# Patient Record
Sex: MALE | Race: WHITE | Employment: OTHER | URBAN - METROPOLITAN AREA
[De-identification: names, ages, dates, MRNs, and addresses within clinical notes are randomized per-mention and may not be internally consistent; named-entity substitution may affect disease eponyms.]

---

## 2017-05-03 ENCOUNTER — TELEPHONE (OUTPATIENT)
Dept: INTERNAL MEDICINE | Facility: CLINIC | Age: 77
End: 2017-05-03

## 2017-05-03 DIAGNOSIS — I10 ESSENTIAL HYPERTENSION WITH GOAL BLOOD PRESSURE LESS THAN 140/90: ICD-10-CM

## 2017-05-03 LAB
CREAT SERPL-MCNC: 1.34 MG/DL (ref 0.66–1.25)
CREAT UR-MCNC: 83 MG/DL
GFR SERPL CREATININE-BSD FRML MDRD: 52 ML/MIN/1.7M2
MICROALBUMIN UR-MCNC: 754 MG/L
MICROALBUMIN/CREAT UR: 906.25 MG/G CR (ref 0–17)

## 2017-05-03 PROCEDURE — 82565 ASSAY OF CREATININE: CPT | Performed by: INTERNAL MEDICINE

## 2017-05-03 PROCEDURE — 82043 UR ALBUMIN QUANTITATIVE: CPT | Performed by: INTERNAL MEDICINE

## 2017-05-03 PROCEDURE — 36415 COLL VENOUS BLD VENIPUNCTURE: CPT | Performed by: INTERNAL MEDICINE

## 2017-05-03 NOTE — TELEPHONE ENCOUNTER
Patient called back and info was given.  Thank you,  Danyell Esquivel   for [facility]

## 2017-05-03 NOTE — TELEPHONE ENCOUNTER
----- Message from Richi Alvarez MD sent at 5/3/2017 11:30 AM CDT -----  Kidneys are stable, some protein in the urine but less then 5 months ago, creatinine is stable at 1.34 today.

## 2017-05-18 ENCOUNTER — TELEPHONE (OUTPATIENT)
Dept: PALLIATIVE MEDICINE | Facility: CLINIC | Age: 77
End: 2017-05-18

## 2017-05-18 ENCOUNTER — OFFICE VISIT (OUTPATIENT)
Dept: NEUROSURGERY | Facility: CLINIC | Age: 77
End: 2017-05-18
Payer: MEDICARE

## 2017-05-18 VITALS — BODY MASS INDEX: 36.43 KG/M2 | TEMPERATURE: 97.4 F | WEIGHT: 246 LBS | HEIGHT: 69 IN

## 2017-05-18 DIAGNOSIS — M54.16 LUMBAR RADICULOPATHY: Primary | ICD-10-CM

## 2017-05-18 PROCEDURE — 99203 OFFICE O/P NEW LOW 30 MIN: CPT | Performed by: PHYSICIAN ASSISTANT

## 2017-05-18 NOTE — MR AVS SNAPSHOT
After Visit Summary   5/18/2017    Endy Navarro    MRN: [id number]           Patient Information     Date Of Birth          1940        Visit Information        Provider Department      5/18/2017 2:40 PM Nikole Mckee PA-C [facility]        Today's Diagnoses     Lumbar radiculopathy    -  1       Follow-ups after your visit        Additional Services     PAIN MANAGEMENT CENTER ([place]) REFERRAL       Your provider has referred you to the [facility].    Reason for Referral: Procedure or injection only - patient will be contacted within 24 hrs to schedule: Lumbar FRANKIE. Provider to determine level.    Please complete the following questions:    What is your diagnosis for the patient's pain? Lumbar radiculopathy    Do you have any specific questions for the pain specialist? No    Are there any red flags that may impact the assessment or management of the patient? None    **ANY DIAGNOSTIC TESTS THAT ARE NOT IN EPIC SHOULD BE SENT TO THE PAIN CENTER**    Please note the Pre-Op Pain Consult must be scheduled 2-3 weeks prior to the patient's surgery.  Patient's trying to schedule within 2 weeks of surgery may not be accommodated.     Pre-Op Pain Consults are only good for 30 days.    REGARDING OPIOID MEDICATIONS:  We will always address appropriateness of opioid pain medications, but we generally will not automatically take on a prescribing role. When we do take on prescribing of opioids for chronic pain, it is in collaboration with the referring physician for an intermediate period of time (months), with an expectation that the primary physician or provider will assume the prescribing role if medications are effective at stable doses with demonstrated compliance.  Therefore, please do not assume that your prescribing responsibilities end on the day of pain clinic consultation.  Is this agreeable to you? YES    For any questions, contact the [facility] at [phone].    Please be aware that coverage of these services is subject to the terms and limitations of your health insurance plan.  Call member services at your health plan with any benefit or coverage questions.      Please bring the following with you to your appointment:    (1) Any X-Rays, CTs or MRIs which have been performed.  Contact the facility where they were done to arrange for  prior to your scheduled appointment.    (2) List of current medications   (3) This referral request   (4) Any documents/labs given to you for this referral            PHYSICAL THERAPY REFERRAL       *This therapy referral will be filtered to a centralized scheduling office at [facility] and the patient will receive a call to schedule an appointment at a [place] location most convenient for them. *     [facility] provides Physical Therapy evaluation and treatment and many specialty services across the [place] system.  If requesting a specialty program, please choose from the list below.    If you have not heard from the scheduling office within 2 business days, please call [phone] for all locations, with the exception of [place], please call [phone].  Treatment: Evaluation & Treatment  Special Instructions/Modalities:   Special Programs: None    Please be aware that coverage of these services is subject to the terms and limitations of your health insurance plan.  Call member services at your health plan with any benefit or coverage questions.      **Note to Provider:  If you are referring outside of [place] for the therapy appointment, please list the name of the location in the  special instructions  above, print the referral and give to the patient to schedule the appointment.                  Who to contact     If you have questions or need follow up information about today's clinic visit or your schedule please contact [facility]  "[place] directly at [phone].  Normal or non-critical lab and imaging results will be communicated to you by MyChart, letter or phone within 4 business days after the clinic has received the results. If you do not hear from us within 7 days, please contact the clinic through Epic!hart or phone. If you have a critical or abnormal lab result, we will notify you by phone as soon as possible.  Submit refill requests through SprainGo or call your pharmacy and they will forward the refill request to us. Please allow 3 business days for your refill to be completed.          Additional Information About Your Visit        Epic!harKnowable Information     SprainGo gives you secure access to your electronic health record. If you see a primary care provider, you can also send messages to your care team and make appointments. If you have questions, please call your primary care clinic.  If you do not have a primary care provider, please call [phone] and they will assist you.        Care EveryWhere ID     This is your Care EveryWhere ID. This could be used by other organizations to access your [place] medical records  [id number]        Your Vitals Were     Temperature Height BMI (Body Mass Index)             97.4  F (36.3  C) (Temporal) 1.753 m (5' 9\") 36.33 kg/m2          Blood Pressure from Last 3 Encounters:   12/05/16 162/88   11/11/16 136/86   10/20/16 134/76    Weight from Last 3 Encounters:   05/18/17 111.6 kg (246 lb)   11/11/16 109.8 kg (242 lb)   10/20/16 109.5 kg (241 lb 4.8 oz)              We Performed the Following     PAIN MANAGEMENT CENTER ([place]) REFERRAL     PHYSICAL THERAPY REFERRAL        Primary Care Provider Office Phone # Fax #    Richi Alvarez [phone]       [facility] [address] [place] [address]        Thank you!     Thank you for choosing [facility]  for your care. Our goal is always to provide you with excellent care. Hearing back from our " patients is one way we can continue to improve our services. Please take a few minutes to complete the written survey that you may receive in the mail after your visit with us. Thank you!             Your Updated Medication List - Protect others around you: Learn how to safely use, store and throw away your medicines at www.disposemymeds.org.          This list is accurate as of: 5/18/17  2:59 PM.  Always use your most recent med list.                   Brand Name Dispense Instructions for use    acetaminophen 650 MG 8 hour tablet     100 tablet    Take 650 mg by mouth every 6 hours       allopurinol 100 MG tablet    ZYLOPRIM    180 tablet    Take 1 tablet (100 mg) by mouth 2 times daily       apixaban ANTICOAGULANT 5 MG tablet    ELIQUIS    180 tablet    Take 1 tablet (5 mg) by mouth 2 times daily       aspirin 81 MG EC tablet      Take 1 tablet (81 mg) by mouth daily       atorvastatin 40 MG tablet    LIPITOR    90 tablet    Take 1 tablet (40 mg) by mouth daily       diltiazem 120 MG 24 hr capsule    DILACOR XR    90 capsule    Take 1 capsule (120 mg) by mouth daily       finasteride 5 MG tablet    PROSCAR    90 tablet    Take 1 tablet (5 mg) by mouth daily       FLUoxetine 20 MG capsule    PROzac    270 capsule    Take 3 capsules (60 mg) by mouth daily       metoprolol 50 MG tablet    LOPRESSOR    180 tablet    Take 1 tablet (50 mg) by mouth 2 times daily       mirtazapine 7.5 MG Tabs tablet    REMERON    90 tablet    Take 1 tablet (7.5 mg) by mouth At Bedtime       nitroglycerin 0.4 MG sublingual tablet    NITROSTAT    25 tablet    Place 1 tablet (0.4 mg) under the tongue every 5 minutes as needed for chest pain       polyethylene glycol Packet    MIRALAX/GLYCOLAX    7 packet    Take 17 g by mouth daily

## 2017-05-18 NOTE — NURSING NOTE
"Endy Navarro is a 76 year old male who presents for:  Chief Complaint   Patient presents with     Consult     Neurologic Problem        Initial Vitals:  Temp 97.4  F (36.3  C) (Temporal)  Ht 1.753 m (5' 9\")  Wt 111.6 kg (246 lb)  BMI 36.33 kg/m2 Estimated body mass index is 36.33 kg/(m^2) as calculated from the following:    Height as of this encounter: 1.753 m (5' 9\").    Weight as of this encounter: 111.6 kg (246 lb).. Body surface area is 2.33 meters squared. BP completed using cuff size: NA (Not Taken)  Data Unavailable    Do you feel safe in your environment?  Yes  Do you need any refills today? No    Nursing Comments:         Josemanuel Nuñez    "

## 2017-05-18 NOTE — PROGRESS NOTES
Dr. Len Barton  [facility]  Neurosurgery Clinic Visit      CC: Left sided low back pain    Primary care Provider: Richi Alvarez      Reason For Visit:   I was asked  to consult on the patient for low back pain.      HPI: Endy Navarro is a 76 year old male who presents for evaluation of left-sided low back pain. Pain has been chronic for many years but has progressively worsened. Radiates down posterior aspect of left leg to the foot. Describes the pain as constantly achy, with intermittent sharp shooting pain. Walking for extended periods causes the pain to worsen. He cannot recall anything that makes the pain better. He has previously been seen at [facility] where he underwent lumbar FRANKIE, which helped relieve the pain for 1-2 months. He has fallen a few times due to stability issues. Denies bladder or bowel incontinence.  Current pain: 5-6/10 At worst: 10/10    Patient also states he had a shunt placed 2 years prior and would like to have the settings checked and change. He had this placed in [place].    Past Medical History:   Diagnosis Date     Anxiety state, unspecified      Atrial fibrillation (H)      Bell's palsy 12/12/1997     Bioprosthetic aortic valve replacement during current hospitalization      Coronary atherosclerosis of unspecified type of vessel, native or graft     coronary artery disease with history of MI and stent placement      Gout, unspecified      Hydrocephalus 2015     Old myocardial infarction 3/1998    Hx of MI     Osteoarthrosis, unspecified whether generalized or localized, unspecified site     Diffuse migratory arthritis     Other and unspecified hyperlipidemia      Paroxysmal supraventricular tachycardia (H)     Hx of PAT     Pure hypercholesterolemia      Stented coronary artery        Past Medical History reviewed with patient during visit.    Past Surgical History:   Procedure Laterality Date     C EXPLORATORY OF ABDOMEN  1/25/2007    Exploratory  laparotomy.  Lysis of adhesions with reduction of internal hernia.     C ROTATOR CUFF STRAP      s/p rotator cuff surgery     COLONOSCOPY N/A 12/5/2016    Procedure: COMBINED COLONOSCOPY, SINGLE OR MULTIPLE BIOPSY/POLYPECTOMY BY BIOPSY;  Surgeon: Benjamin Cavazos MD;  Location: [facility]     HC COLONOSCOPY THRU STOMA, DIAGNOSTIC  8/23/2004     HC KNEE SCOPE,MED/LAT MENISCUS REPAIR       HC REMOVE TONSILS/ADENOIDS,<13 Y/O      unsure of age     Hydrocephalus  2015    shunt placed     REMOVAL OF SPERM DUCT(S)      Vasectomy     REPLACE VALVE AORTIC N/A 9/14/2015    Procedure: REPLACE VALVE AORTIC;  Surgeon: Sang Chan MD;  Location: [facility]     Past Surgical History reviewed with patient during visit.    Current Outpatient Prescriptions   Medication     finasteride (PROSCAR) 5 MG tablet     mirtazapine (REMERON) 7.5 MG TABS tablet     FLUoxetine (PROZAC) 20 MG capsule     allopurinol (ZYLOPRIM) 100 MG tablet     atorvastatin (LIPITOR) 40 MG tablet     diltiazem (DILACOR XR) 120 MG 24 hr ER capsule     metoprolol (LOPRESSOR) 50 MG tablet     nitroglycerin (NITROSTAT) 0.4 MG SL tablet     apixaban ANTICOAGULANT (ELIQUIS) 5 MG tablet     acetaminophen 650 MG TABS     aspirin EC 81 MG EC tablet     polyethylene glycol (MIRALAX/GLYCOLAX) packet     No current facility-administered medications for this visit.        Allergies   Allergen Reactions     Seroquel [Quetiapine] Other (See Comments)     Felt funny       Social History     Social History     Marital status:      Spouse name: N/A     Number of children: N/A     Years of education: N/A     Occupational History     Laundry [employer]      Social History Main Topics     Smoking status: Former Smoker     Smokeless tobacco: Never Used      Comment: quit 25 yr ago     Alcohol use 0.0 oz/week     0 Standard drinks or equivalent per week      Comment: Occ     Drug use: No     Sexual activity: Yes     Partners: Female     Other Topics Concern      Service  Yes     Blood Transfusions No     Caffeine Concern No     coffee- 3-4 cups daily     Occupational Exposure No     Hobby Hazards No     Sleep Concern Yes     trouble sleeping     Stress Concern No     Weight Concern No     Special Diet No     Back Care Yes     Low back pain with riding in car     Exercise No     Seat Belt Yes     Self-Exams Yes     Social History Narrative       Family History   Problem Relation Age of Onset     C.A.D. Sister      C.A.D. Brother      DIABETES Maternal Aunt      CEREBROVASCULAR DISEASE Mother      CEREBROVASCULAR DISEASE Father      Hypertension Father      Cancer - colorectal No family hx of      Prostate Cancer Brother           ROS: 10 point ROS neg other than the symptoms noted above in the HPI.    Vital Signs: There were no vitals taken for this visit.    Examination:  Constitutional:  Alert, well nourished, NAD.  HEENT: Normocephalic, atraumatic.   Pulmonary:  Without shortness of breath, normal effort.   Lymph: no lymphadenopathy to low back or LE.   Integumentary: Skin is free of rashes or lesions.   Cardiovascular:  No pitting edema of BLE.      Neurological:  Awake  Alert  Oriented x 3  Speech clear  Cranial nerves II - XII grossly intact  Tongue midline  Motor exam   Shoulder Abduction:  Right:  5/5   Left:  5/5  Biceps:                      Right:  5/5   Left:  5/5  Triceps:                     Right:  5/5   Left:  5/5  Wrist Extensors:       Right:  5/5   Left:  5/5  Wrist Flexors:           Right:  5/5   Left:  5/5  Intrinsics:                   Right:  5/5   Left:  5/5  Hip Flexor:                Right: 5/5  Left:  5/5  Hip Adductor:             Right:  5/5  Left:  5/5  Hip Abductor:             Right:  5/5  Left:  5/5  Gastroc Soleus:        Right:  5/5  Left:  5/5  Tib/Ant:                      Right:  5/5  Left:  5/5  EHL:                          Right:  5/5  Left:  5/5       Sensation normal to bilateral upper and lower extremities.    Reflexes are 2+ in the  patellar and Achilles. There is no clonus. Downgoing Babinski.    Reflexes are 2+ in the brachial radialis and triceps. Negative Eleni sign bilaterally.  Musculoskeletal:  Gait: Able to stand from a seated position. Normal non-antalgic, non-myelopathic gait.  Able to heel/toe walk without loss of balance  Lumbar examination reveals no tenderness of the spine or paraspinous muscles.  Hip height is symmetrical. Negative SI joint, sciatic notch or greater trochanteric tenderness to palpation bilaterally.  Straight leg raise is negative bilaterally.      Imaging:   CT of the lumbar spine from 2/29/2016 was reviewed in the office today. Reveals multilevel secondary disc and facet change with alignment changes, with canal stenosis at L3-4 and L4-5, with lesser changes contributing primarily to lateral recess-foraminal stenosis L1-S1.      Assessment/Plan:   Endy Navarro is a 76 year old male who presents for evaluation of left-sided low back pain. Pain has been chronic for many years but has progressively worsened. Radiates down posterior aspect of left leg to the foot. He has undergone FRANKIE's in the past, which has provided relief. After discussion with the patient, he would like to try another FRANKIE and physical therapy at this time. Lumbar FRANKIE and PT referral sent. Advised patient to talk with doctor about stopping blood thinners for injection. Patient in agreement with plan. Also advised patient to have records from shunt placement sent to our office and call to schedule appointment in regards to this.          Nikole Mckee PA-C  [facility]  [address] [address]    Tel [phone]  Pager [phone]

## 2017-05-18 NOTE — TELEPHONE ENCOUNTER
Pre-screening Questions for Radiology Injections:    Injection to be done at which interventional clinic site? Given    Procedure ordered by Nikole Mckee    Procedure ordered? Lumbar Epidural Steroid Injection    What insurance would patient like us to bill for this procedure? BCBS      Worker's comp-Any injection DO NOT SCHEDULE and route to Mirian Torres.      [employer] insurance - For SI joint injections, DO NOT SCHEDULE and route Mirian Torres.      [employer]- MBB's must be scheduled at LEAST two weeks apart      [employer] - Any injection besides hip/shoulder/knee joint DO NOT SCHEDULE and route to Mirian Torres. She will obtain PA and call pt back to schedule procedure or notify pt of denial.     HP [employer]-PA REQUIRED FOR NON-FRANKIE OR Joint injections    Any chance of pregnancy? Not Applicable   If YES, do NOT schedule and route to RN pool    Is an  needed? No     Patient has a drive home? (mandatory) YES: INFORMED    Is patient taking any blood thinners (plavix, coumadin, jantoven, warfarin, heparin, pradaxa or dabigatran )? Yes - apixaban ANTICOAGULANT (ELIQUIS) 5 MG tablet   If hold needed, do NOT schedule, route to RN pool     Is patient taking any aspirin products? Yes - Pt takes 81mg daily; instructed to hold 0 day(s) prior to procedure.      If more than 325mg/day do NOT schedule; route to RN pool     For CERVICAL procedures, hold all aspirin products for 6 days.      Does the patient have a bleeding or clotting disorder? No     If yes, okay to schedule AND route to RN nurse pool    **For any patients with platelet count <100, must be forwarded to provider**    Is patient diabetic?  No  If YES, have them bring their glucometer.    Does patient have an active infection or treated for one within the past week? No     Is patient currently taking any antibiotics?  No     For patients on chronic, preventative, or prophylactic antibiotics, procedures may be scheduled.     For patients on  antibiotics for active or recent infection:    Sharmaine Heller [place], [place]-antibiotic course must have been completed for 4 days    Marina Shanks-antibiotic course must have been completed for 7 days    Is patient currently taking any steroid medications? (i.e. Prednisone, Medrol)  No     For patients on steroid medications:    Sharmaine Heller [place], [place]-steroid course must have been completed for 4 days    Marina Shanks-steroid course must have been completed for 7 days    Reviewed with patient:  If you are started on any steroids or antibiotics between now and your appointment, you must contact us because it may affect our ability to perform your procedure.  Yes    Is patient actively being treated for cancer or immunocompromised, including the spleen having been removed? No    If YES, do NOT schedule and route to RN pool     **For Dr. Gerardo patients without spleens should have the chart sent to her**    Are you able to get on and off an exam table with minimal or no assistance? Yes  If NO, do NOT schedule and route to RN pool    Are you able to roll over and lay on your stomach with minimal or no assistance? Yes  If NO, do NOT schedule and route to RN pool     Any allergies to contrast dye, iodine, shellfish, or numbing and steroid medications? No  If YES, route to RN pool AND add allergy information to appointment notes    Allergies: Seroquel [quetiapine]        Has the patient had a flu shot or any other vaccinations within 7 days before or after the procedure.  No       Does patient have an MRI/CT?  YES: CT  (SI joint, hip injections, lumbar sympathetic blocks, and stellate ganglion blocks do not require an MRI)    Was the MRI done w/in the last 3 years?  YES -     Was MRI done at [facility]? No      If not, where was it done? IN [place] - [facility]       If MRI was not done at [facility], [facility] or [facility] do NOT schedule and route to nursing.  If pt has an  imaging disc, the injection may be scheduled but pt has to bring disc to appt. If they show up w/out disc the injection cannot be done    Reminders (please tell patient if applicable):       Instructed pt to arrive 30 minutes early for IV start if this is for a cervical procedure, ALL sympathetic (stellate ganglion, hypogastric, or lumbar sympathetic block) and all sedation procedures (RFA, spinal cord stimulation trials).  Not Applicable    -IVs are not routinely placed for Schmid and [place] cervical case       If NPO for sedation, informed patient that it is okay to take medications with sips of water (except if they are to hold blood thinners).  Not Applicable   *DO take blood pressure medication if it is prescribed*      If this is for a cervical FRANKIE, informed patient that aspirin needs to be held for 6 days.   Not Applicable      For all patients not having spinal cord stimulator (SCS) trials or radiofrequency ablations (RFAs), informed patient:  IV sedation is not provided for this procedure.  If you feel that an oral anti-anxiety medication is needed, you can discuss this further with your referring provider or primary care provider.  The Pain Clinic provider will discuss specifics of what the procedure includes at your appointment.  Most procedures last 10-20 minutes.  We use numbing medications to help with any discomfort during the procedure.  Not Applicable      Do not schedule procedures requiring IV placement in the first appointment of the day or first appointment after lunch. REVIEWED      For patients 85 or older we recommend having an adult stay w/ them for the remainder of the day.   REVIEWED    Does the patient have any questions?  NO  Verito Lindsey    [facility]    Verito Lindsey  [facility]

## 2017-05-19 NOTE — TELEPHONE ENCOUNTER
Dr. Morrell is it safe for pt to hold eloquis for 5 days before a lumbar epidural steroid injection?  Please keep call open.    Rubia Alonso RN-BSN  [facility]

## 2017-05-26 NOTE — TELEPHONE ENCOUNTER
Dr. Morrell,   Patient is requesting to schedule an injection with us. This would require an approval to hold the Eloquis for 5 days prior to injection with injection occuring on the 6th day. Barring any complications, typically patients resume their Eloquis 24hours after the injection.  We are requesting your approval to hold the medication for this time frame. We appreciate your assistance in this matter.     Please do not close encounter so that we may follow up with patient.     Jennifer Hairston  BSN-RN Care Coordinator  [facility]

## 2017-05-26 NOTE — TELEPHONE ENCOUNTER
Pt called wondering the status of scheduling Lumbar FRANKIE. Informed him that we are still waiting on a response from Dr. Morrell on holding his Eloquis. Please advise.    Verito Lindsey    [facility]

## 2017-05-30 NOTE — TELEPHONE ENCOUNTER
Per staff message from Dr Sevilla:  Message  Received: 5 days ago       John Morrell MD Fischer, Rubia ADAN, RN                   Yes - likely so.   3 days is probably enough.    Risk of stroke vs bleeding is greater for bleeding complications     je              Our protocol is a 5 day hold for eloquis.    Routed to Dr. Yenny Joshua to review.  Is 3 days adequate?  If not does provider mean a 5 day hold is okay too?    Rubia Alonso, RN-BSN  [facility]

## 2017-05-31 NOTE — TELEPHONE ENCOUNTER
Contact Attempt      Outreach attempted x 1.  Left message on voicemail with call back information and requested return call.    Plan:  Will await for CB.     Mackenzie Lai RN, [facility]  Pain Clinic Care Coordinator

## 2017-05-31 NOTE — TELEPHONE ENCOUNTER
Per PRINCE guidelines, a 3 day hold is adequate for Eloquis with injection on the 4th day and patient could re-start medication 6-8 hours after the injection.    Jacob Joshua MD  [facility]

## 2017-06-01 NOTE — TELEPHONE ENCOUNTER
5/31 442pm    Patient LM returning call about an injection appt [phone].    Gladys Florence    [facility]

## 2017-06-01 NOTE — TELEPHONE ENCOUNTER
Pt LM at 1004 on 5/31 returning a call about appt for injection. Phone # [phone]     eVrito Lindsey    [facility]

## 2017-06-02 NOTE — TELEPHONE ENCOUNTER
Scheduled from [place] location, advised to hold eloquis per below.     Jennifer Hairston  BSN-RN Care Coordinator  [facility]

## 2017-06-02 NOTE — TELEPHONE ENCOUNTER
Patient left  at 11:19 am    He is waiting for a return call.      Mirian BROUSSARD    [facility]

## 2017-06-05 ENCOUNTER — HOSPITAL ENCOUNTER (OUTPATIENT)
Dept: PHYSICAL THERAPY | Facility: CLINIC | Age: 77
Setting detail: THERAPIES SERIES
End: 2017-06-05
Attending: PHYSICIAN ASSISTANT
Payer: MEDICARE

## 2017-06-05 PROCEDURE — G8979 MOBILITY GOAL STATUS: HCPCS | Mod: GP,CI | Performed by: PHYSICAL THERAPIST

## 2017-06-05 PROCEDURE — 97530 THERAPEUTIC ACTIVITIES: CPT | Mod: GP | Performed by: PHYSICAL THERAPIST

## 2017-06-05 PROCEDURE — 97162 PT EVAL MOD COMPLEX 30 MIN: CPT | Mod: GP | Performed by: PHYSICAL THERAPIST

## 2017-06-05 PROCEDURE — G8978 MOBILITY CURRENT STATUS: HCPCS | Mod: GP,CJ | Performed by: PHYSICAL THERAPIST

## 2017-06-05 PROCEDURE — 40000718 ZZHC STATISTIC PT DEPARTMENT ORTHO VISIT: Performed by: PHYSICAL THERAPIST

## 2017-06-05 NOTE — PROGRESS NOTES
[facility]          OUTPATIENT PHYSICAL THERAPY ORTHOPEDIC EVALUATION  PLAN OF TREATMENT FOR OUTPATIENT REHABILITATION  (COMPLETE FOR INITIAL CLAIMS ONLY)  Patient's Last Name, First Name, M.I.  YOB: 1940  Endy Navarro    Provider s Name:  [facility]   Medical Record No.  [id number]   Start of Care Date:  06/05/17   Onset Date:  06/05/16   Type:     _X__PT   ___OT   ___SLP Medical Diagnosis:  Lumbar radiculopathy     PT Diagnosis:  impaired balance, Pain in L low back, buttock and posterior thigh.consistent with sciatic nerve pain.   Visits from SOC:  1      _________________________________________________________________________________  Plan of Treatment/Functional Goals:  manual therapy, neuromuscular re-education, ROM, strengthening, balance training, stretching     Cryotherapy     Goals  Goal Identifier: Walking  Goal Description: Pt able to walk for 1 mile with out increased back or leg pain  Target Date: 09/03/17    Goal Identifier: Stairs  Goal Description: Pt able to climb stairs reciprocally  in order to access basement  Target Date: 09/03/17    Goal Identifier: HEP  Goal Description: Indep with HEP for core stabilization and strengthening   Target Date: 09/03/17    Goal Identifier: Sleeping  Goal Description: pt sleeps in a recliner and has for many years.  Pt report that he sleeps poorly most of the time  Target Date: 09/03/17                      Therapy Frequency:  2 times/Week  Predicted Duration of Therapy Intervention:  90 days    Waleska Coles, PT                 I CERTIFY THE NEED FOR THESE SERVICES FURNISHED UNDER        THIS PLAN OF TREATMENT AND WHILE UNDER MY CARE     (Physician co-signature of this document indicates review and certification of the therapy plan).                       Certification Date From:  06/05/17   Certification Date To:  09/03/17    Referring Provider:  Nikole Tate  Assessment        See Epic Evaluation Start of Care Date: 06/05/17

## 2017-06-05 NOTE — PROGRESS NOTES
" 06/05/17 3336   General Information   Type of Visit Initial OP Ortho PT Evaluation   Start of Care Date 06/05/17   Referring Physician Nikole Mckee   Patient/Family Goals Statement to be able to walk with out pain   Orders Evaluate and Treat   Date of Order 05/18/17   Insurance Type Medicare  ([employer] secondary)   Medical Diagnosis Lumbar radiculopathy   Surgical/Medical history reviewed Yes  (Significant Cardiac HX, shunt!)   Precautions/Limitations (cardiac and pt has a shunt)   Body Part(s)   Body Part(s) Lumbar Spine/SI   Presentation and Etiology   Pertinent history of current problem (include personal factors and/or comorbidities that impact the POC) Pt reports that he is pretty sore from his belt line on left side and down his L leg. every so often he gets a shot down the R leg too (weekly).  Onset of sx about 1 year ago. He had 2 injections in his back  (most recently in October) which were helpful temporarily.  He likes to walk  Used to walk 3-4 miles per day (last fall) and now he can only do about 100yards.   increased pain with stairs,  in/out of car,  sleeps in his chair. Pain in L leg goes down to the ankle. Endorses some t/n in his feet.  Pain with putting on socks.  Stretching helps.  Has had brain (shunt) , abdominal and cardiac surgery  in the past 3 years.  He reports that he tires very easily and stairs \" really get to me\".  [place] to [place] and has a medical team down there too.      Impairments A. Pain;D. Decreased ROM;E. Decreased flexibility;F. Decreased strength and endurance;H. Impaired gait   Functional Limitations perform activities of daily living;perform required work activities;perform desired leisure / sports activities   Symptom Location L low back and L LE primarily.  Occasional sx in R LE   How/Where did it occur With repetition/overuse   Onset date of current episode/exacerbation 06/05/16   Chronicity Chronic   Pain rating (0-10 point scale) Best (/10);Worst (/10)   Best " (/10) 4   Worst (/10) 8   Pain quality C. Aching;B. Dull;A. Sharp   Frequency of pain/symptoms A. Constant   Pain/symptoms exacerbated by B. Walking;G. Certain positions;H. Overhead reach;J. ADL;K. Home tasks   Pain/symptoms eased by B. Walking;C. Rest;E. Changing positions;F. Certain positions  (takes tylenol,  no change. )   Progression of symptoms since onset: Worsened  (due to weight gain)   Prior Level of Function   Prior Level of Function-Mobility indep   Prior Level of Function-ADLs indep   Functional Level Prior Comment indep   Current Level of Function   Current Community Support Family/friend caregiver   Patient role/employment history F. Retired   Living environment House/townhome   Home/community accessibility no stairs to enter. and all on 1 level   Current equipment-Gait/Locomotion None   Current equipment-ADL None   Fall Risk Screen   Fall screen completed by PT   Per patient - Fall 2 or more times in past year? Yes   Per patient - Fall with injury in past year? No   Timed Up and Go score (seconds) 12.69   Is patient a fall risk? No   Fall screen comments times when he is walking and tries to change direction and he would fall over. TU.69. Decreased stance time on L.   System Outcome Measures   Outcome Measures Low Back Pain (see Oswestry and Lyric)   Vital Signs   Pulse 86   SpO2 97 %   Lumbar Spine/SI Objective Findings   Integumentary dry skin minor cuts and scrapes on ankles and legs but grossly in tact.    Posture Kyposis through throcic spine, forward head    Gait/Locomotion Decreased stance time on L. trunk lean to R   Balance/Proprioception (Single Leg Stance) Decreased balance during heel walking and toe walking, requires lino hand hold assist, but able to perform.    Flexion ROM Feels good ,90% forward bend excursion, decreased reversal of lumbar curve   Extension ROM reproduces symptoms in low back and L LE. 30% extension excursion    Right Side Bending ROM 50% sidebend excursion     Left Side Bending ROM Painful, 50% excursion    Lumbar ROM Comment Painful in L Low back and L buttock with rotation bilaterally. 30% rotation excursion bilaterally   Hip Screen R: (-) Scour, (-) Stinchfield, (-) BHAVNA, (-) SLR    L: (-) Scour, (-) Stinchfield, (+) BHAVNA - pain in L low back, (+) SLR at 40 degrees   Transversus Abdominus Strength (Laci Leg Lowering-deg) decreased functionally due to body habitus and  over all strength    Hip Flexion (L2) Strength L: 4-/5  R: 4+/5   Hip Extension Strength L: 4+/5, R: 4+/5, pain free   Knee Flexion Strength L: 5/5 R: 5/5   Knee Extension (L3) Strength L: 5/5 R: 5/5   Ankle Dorsiflexion (L4) Strength L: 5/5, R: 5/5   Great Toe Extension (L5) Strength L: 5/5, R:5/5    Ankle Plantar Flexion (S1) Strength L: 5/5, R: 5/5    Hamstring Flexibility 50 degrees   Hip Flexor Flexibility WFL   Quadricep Flexibility WFL   Piriformis Flexibility tight and TTP   SLR R: (-),   L:(+) at 40 degrees   Ely Test (-) bilaterally   Crossover SLR neg   Spring Test Lumbar PA stiff but not tender. Thoracic PA stiff with R rotation, not tender   Segmental Mobility tight all the way along spine   Palpation L multifidi overworking, R multifidi not firing well with prone SLR. Piriformis pain on L with bilfold carried on L. R ribs more prominent, R rotation of thoracic vertebrae. QL not tender or tight.    Planned Therapy Interventions   Planned Therapy Interventions manual therapy;neuromuscular re-education;ROM;strengthening;balance training;stretching   Planned Modality Interventions   Planned Modality Interventions Cryotherapy   Clinical Impression   Criteria for Skilled Therapeutic Interventions Met yes, treatment indicated   PT Diagnosis impaired balance, Pain in L low back, buttock and posterior thigh.consistent with sciatic nerve pain.   Influenced by the following impairments pain, impaired balance, decreased strength, endurance and flexibility   Functional limitations due to  impairments decreased ability to perform ADLS and IADLS   Clinical Presentation Evolving/Changing   Clinical Presentation Rationale Pt has a complex PMHX including an intracranial shunt,  previous MI and valve replacement surgery.    Clinical Decision Making (Complexity) Moderate complexity   Therapy Frequency 2 times/Week   Predicted Duration of Therapy Intervention (days/wks) 90 days   Risk & Benefits of therapy have been explained Yes   Patient, Family & other staff in agreement with plan of care Yes   Clinical Impression Comments Pt presents with impaired balance, Pain in L low back, buttock and posterior thigh.consistent with sciatic nerve pain. Pt will benefit from skilled PT for instruction in HEP for stretching, strengtheing and proprioceptive retraining.  Pt will also benefit from skilled PT for manual techniques and modalities to improve alignment and decrease pain   Education Assessment   Preferred Learning Style Listening   Barriers to Learning No barriers   ORTHO GOALS   PT Ortho Eval Goals 1;2;3;4   Ortho Goal 1   Goal Identifier Walking   Goal Description Pt able to walk for 1 mile with out increased back or leg pain   Target Date 09/03/17   Ortho Goal 2   Goal Identifier Stairs   Goal Description Pt able to climb stairs reciprocally  in order to access basement   Target Date 09/03/17   Ortho Goal 3   Goal Identifier HEP   Goal Description Indep with HEP for core stabilization and strengthening    Target Date 09/03/17   Ortho Goal 4   Goal Identifier Sleeping   Goal Description pt sleeps in a recliner and has for many years.  Pt report that he sleeps poorly most of the time   Target Date 09/03/17   Total Evaluation Time   Total Evaluation Time 50   Therapy Certification   Certification date from 06/05/17   Certification date to 09/03/17   Medical Diagnosis Lumbar radiculopathy

## 2017-06-06 ENCOUNTER — OFFICE VISIT (OUTPATIENT)
Dept: INTERNAL MEDICINE | Facility: CLINIC | Age: 77
End: 2017-06-06
Payer: MEDICARE

## 2017-06-06 VITALS
WEIGHT: 238.5 LBS | BODY MASS INDEX: 35.22 KG/M2 | OXYGEN SATURATION: 97 % | DIASTOLIC BLOOD PRESSURE: 80 MMHG | HEART RATE: 71 BPM | TEMPERATURE: 98.9 F | SYSTOLIC BLOOD PRESSURE: 128 MMHG | RESPIRATION RATE: 14 BRPM

## 2017-06-06 DIAGNOSIS — G89.29 CHRONIC LEFT-SIDED LOW BACK PAIN WITH LEFT-SIDED SCIATICA: ICD-10-CM

## 2017-06-06 DIAGNOSIS — I48.20 CHRONIC ATRIAL FIBRILLATION (H): ICD-10-CM

## 2017-06-06 DIAGNOSIS — M54.42 CHRONIC LEFT-SIDED LOW BACK PAIN WITH LEFT-SIDED SCIATICA: ICD-10-CM

## 2017-06-06 DIAGNOSIS — F33.0 MAJOR DEPRESSIVE DISORDER, RECURRENT EPISODE, MILD (H): ICD-10-CM

## 2017-06-06 DIAGNOSIS — Z01.818 PREOP GENERAL PHYSICAL EXAM: Primary | ICD-10-CM

## 2017-06-06 PROCEDURE — 99214 OFFICE O/P EST MOD 30 MIN: CPT | Performed by: INTERNAL MEDICINE

## 2017-06-06 ASSESSMENT — PAIN SCALES - GENERAL: PAINLEVEL: NO PAIN (0)

## 2017-06-06 NOTE — MR AVS SNAPSHOT
After Visit Summary   6/6/2017    Endy Navarro    MRN: [id number]           Patient Information     Date Of Birth          1940        Visit Information        Provider Department      6/6/2017 3:15 PM Richi Alvarez MD [facility]        Today's Diagnoses     Preop general physical exam    -  1    Mild major depression (H)          Care Instructions      Before Your Surgery      Call your surgeon if there is any change in your health. This includes signs of a cold or flu (such as a sore throat, runny nose, cough, rash or fever).    Do not smoke, drink alcohol or take over the counter medicine (unless your surgeon or primary care doctor tells you to) for the 24 hours before and after surgery.    If you take prescribed drugs: Follow your doctor s orders about which medicines to take and which to stop until after surgery.    Eating and drinking prior to surgery: follow the instructions from your surgeon    Take a shower or bath the night before surgery. Use the soap your surgeon gave you to gently clean your skin. If you do not have soap from your surgeon, use your regular soap. Do not shave or scrub the surgery site.  Wear clean pajamas and have clean sheets on your bed.           Follow-ups after your visit        Your next 10 appointments already scheduled     Jun 07, 2017  7:30 AM CDT   Ortho Treatment with Elisha Ling PT   [facility] ([facility])    [address] [place] [address]   [phone]            Jun 08, 2017   Procedure with Pawan Davenport MD   [facility] ([facility])    [address] [place] [address]   [phone]           From [address]: Exit at [address] on [address] side of [place]. Turn right on [address]. Turn left at stoplight on [address]. [facility] will be in view two blocks ahead            Jun 08, 2017  9:30 AM CDT   XR  FLUORO NEEDLE PLACEMENT SPINE with PHCARM1   [facility] ([facility])    [address]  [place] [id number] [phone]           For nerve root injection, please send or bring copies of any MRIs or other scans you have had.  Bring a list of your current medicines to your exam. (Include vitamins, minerals and over-the-counter medicines.) Leave your valuables at home.  Plan to have someone drive you home afterward.  Stop taking the following medicines (but talk to your doctor first):   If you take blood thinners, you may need to stop taking them a few days before treatment. Talk to your doctor before stopping these medicines.Stop taking Coumadin (warfarin) 3 days before treatment. Restart the day after treatment.   If you take Plavix, Ticlid, Pletal or Persantine, please ask your doctor if you should stop these medicines. You may need extra tests on the morning of your scan. You may take your other medicines as normal.  Stop all food and drink (including water) 3 hours before your test or treatment.  Please tell the doctor:   If you are allergic to X-ray dye (contrast fluid).   If you may be pregnant.  Injections take about 30 to 45 minutes. Most people spend up to 2 hours in the clinic or hospital.  Please call the Imaging Department at your exam site with any questions            Jun 12, 2017  8:15 AM CDT   Ortho Treatment with Elisha Ling, PT   [facility] ([facility])    [address] [place] [id number] [phone]            Jun 14, 2017  8:30 AM CDT   Ortho Treatment with Elisha Ling PT   [facility] ([facility])    [address] [place] [id number] [phone]            Jun 19, 2017  8:30 AM CDT   Ortho Treatment with Elisha Ling PT   [facility] ([facility])    [address] [place] [address]    [phone]            Jun 21, 2017  8:30 AM CDT   Ortho Treatment with Elisha Ling, PT   [facility] ([facility])    [address] [place] [id number] [phone]            Jun 26, 2017  8:30 AM CDT   Ortho Treatment with Elisha Ling, PT   [facility] ([facility])    [address] [place] [address]   [phone]            Jun 28, 2017  8:30 AM CDT   Ortho Treatment with Elisha Ling, PT   [facility] ([facility])    [address] [place] [address] [phone]              Future tests that were ordered for you today     Open Future Orders        Priority Expected Expires Ordered    XR Fluoro Needle Placement Spine (With Procedure) Routine 6/5/2017 6/5/2018 6/5/2017            Who to contact     If you have questions or need follow up information about today's clinic visit or your schedule please contact [facility] directly at [phone].  Normal or non-critical lab and imaging results will be communicated to you by [facility] [employer], letter or phone within 4 business days after the clinic has received the results. If you do not hear from us within 7 days, please contact the clinic through [employer] or phone. If you have a critical or abnormal lab result, we will notify you by phone as soon as possible.  Submit refill requests through [employer] or call your pharmacy and they will forward the refill request to us. Please allow 3 business days for your refill to be completed.          Additional Information About Your Visit        [facility] [employer] [place] Information     [employer] gives you secure access to your electronic health record. If you see a primary care provider, you can also send messages to your care team and make appointments. If you have questions, please call your primary care clinic.  If you do not have a primary care provider, please call [phone]  and they will assist you.        Care EveryWhere ID     This is your Care EveryWhere ID. This could be used by other organizations to access your [place] medical records  [id number]        Your Vitals Were     Pulse Temperature Respirations Pulse Oximetry BMI (Body Mass Index)       71 98.9  F (37.2  C) (Tympanic) 14 97% 35.22 kg/m2        Blood Pressure from Last 3 Encounters:   06/06/17 128/80   12/05/16 162/88   11/11/16 136/86    Weight from Last 3 Encounters:   06/06/17 238 lb 8 oz (108.2 kg)   05/18/17 246 lb (111.6 kg)   11/11/16 242 lb (109.8 kg)              We Performed the Following     DEPRESSION ACTION PLAN (DAP)        Primary Care Provider Office Phone # Fax #    Richi Alvarez [phone]       [facility] [address] [place] [address]        Thank you!     Thank you for choosing [facility]  for your care. Our goal is always to provide you with excellent care. Hearing back from our patients is one way we can continue to improve our services. Please take a few minutes to complete the written survey that you may receive in the mail after your visit with us. Thank you!             Your Updated Medication List - Protect others around you: Learn how to safely use, store and throw away your medicines at www.disposemymeds.org.          This list is accurate as of: 6/6/17  3:21 PM.  Always use your most recent med list.                   Brand Name Dispense Instructions for use    acetaminophen 650 MG 8 hour tablet     100 tablet    Take 650 mg by mouth every 6 hours       allopurinol 100 MG tablet    ZYLOPRIM    180 tablet    Take 1 tablet (100 mg) by mouth 2 times daily       apixaban ANTICOAGULANT 5 MG tablet    ELIQUIS    180 tablet    Take 1 tablet (5 mg) by mouth 2 times daily       aspirin 81 MG EC tablet      Take 1 tablet (81 mg) by mouth daily       atorvastatin 40 MG tablet    LIPITOR    90 tablet    Take 1 tablet (40 mg) by mouth daily        diltiazem 120 MG 24 hr capsule    DILACOR XR    90 capsule    Take 1 capsule (120 mg) by mouth daily       finasteride 5 MG tablet    PROSCAR    90 tablet    Take 1 tablet (5 mg) by mouth daily       FLUoxetine 20 MG capsule    PROzac    270 capsule    Take 3 capsules (60 mg) by mouth daily       metoprolol 50 MG tablet    LOPRESSOR    180 tablet    Take 1 tablet (50 mg) by mouth 2 times daily       mirtazapine 7.5 MG Tabs tablet    REMERON    90 tablet    Take 1 tablet (7.5 mg) by mouth At Bedtime       nitroglycerin 0.4 MG sublingual tablet    NITROSTAT    25 tablet    Place 1 tablet (0.4 mg) under the tongue every 5 minutes as needed for chest pain       polyethylene glycol Packet    MIRALAX/GLYCOLAX    7 packet    Take 17 g by mouth daily

## 2017-06-06 NOTE — LETTER
My Depression Action Plan  Name: Endy Navarro   Date of Birth 1940  Date: 6/6/2017    My doctor: Richi Alvarez   My clinic: [address] [address] [phone]          GREEN    ZONE   Good Control    What it looks like:     Things are going generally well. You have normal up s and down s. You may even feel depressed from time to time, but bad moods usually last less than a day.   What you need to do:  1. Continue to care for yourself (see self care plan)  2. Check your depression survival kit and update it as needed  3. Follow your physician s recommendations including any medication.  4. Do not stop taking medication unless you consult with your physician first.           YELLOW         ZONE Getting Worse    What it looks like:     Depression is starting to interfere with your life.     It may be hard to get out of bed; you may be starting to isolate yourself from others.    Symptoms of depression are starting to last most all day and this has happened for several days.     You may have suicidal thoughts but they are not constant.   What you need to do:     1. Call your care team, your response to treatment will improve if you keep your care team informed of your progress. Yellow periods are signs an adjustment may need to be made.     2. Continue your self-care, even if you have to fake it!    3. Talk to someone in your support network    4. Open up your depression survival kit           RED    ZONE Medical Alert - Get Help    What it looks like:     Depression is seriously interfering with your life.     You may experience these or other symptoms: You can t get out of bed most days, can t work or engage in other necessary activities, you have trouble taking care of basic hygiene, or basic responsibilities, thoughts of suicide or death that will not go away, self-injurious behavior.     What you need to do:  1. Call your care team and  request a same-day appointment. If they are not available (weekends or after hours) call your local crisis line, emergency room or 911.      Electronically signed by: Carmen Gomez, June 6, 2017    Depression Self Care Plan / Survival Kit    Self-Care for Depression  Here s the deal. Your body and mind are really not as separate as most people think.  What you do and think affects how you feel and how you feel influences what you do and think. This means if you do things that people who feel good do, it will help you feel better.  Sometimes this is all it takes.  There is also a place for medication and therapy depending on how severe your depression is, so be sure to consult with your medical provider and/ or Behavioral Health Consultant if your symptoms are worsening or not improving.     In order to better manage my stress, I will:    Exercise  Get some form of exercise, every day. This will help reduce pain and release endorphins, the  feel good  chemicals in your brain. This is almost as good as taking antidepressants!  This is not the same as joining a gym and then never going! (they count on that by the way ) It can be as simple as just going for a walk or doing some gardening, anything that will get you moving.      Hygiene   Maintain good hygiene (Get out of bed in the morning, Make your bed, Brush your teeth, Take a shower, and Get dressed like you were going to work, even if you are unemployed).  If your clothes don't fit try to get ones that do.    Diet  I will strive to eat foods that are good for me, drink plenty of water, and avoid excessive sugar, caffeine, alcohol, and other mood-altering substances.  Some foods that are helpful in depression are: complex carbohydrates, B vitamins, flaxseed, fish or fish oil, fresh fruits and vegetables.    Psychotherapy  I agree to participate in Individual Therapy (if recommended).    Medication  If prescribed medications, I agree to take them.  Missing doses can  result in serious side effects.  I understand that drinking alcohol, or other illicit drug use, may cause potential side effects.  I will not stop my medication abruptly without first discussing it with my provider.    Staying Connected With Others  I will stay in touch with my friends, family members, and my primary care provider/team.    Use your imagination  Be creative.  We all have a creative side; it doesn t matter if it s oil painting, sand castles, or mud pies! This will also kick up the endorphins.    Witness Beauty  (AKA stop and smell the roses) Take a look outside, even in mid-winter. Notice colors, textures. Watch the squirrels and birds.     Service to others  Be of service to others.  There is always someone else in need.  By helping others we can  get out of ourselves  and remember the really important things.  This also provides opportunities for practicing all the other parts of the program.    Humor  Laugh and be silly!  Adjust your TV habits for less news and crime-drama and more comedy.    Control your stress  Try breathing deep, massage therapy, biofeedback, and meditation. Find time to relax each day.     My support system    Clinic Contact:  Phone number:    Contact 1:  Phone number:    Contact 2:  Phone number:    [employer]/:  Phone number:    Therapist:  Phone number:    Local crisis center:    Phone number:    Other community support:  Phone number:

## 2017-06-06 NOTE — NURSING NOTE
"Chief Complaint   Patient presents with     Pre-Op Exam     06/08 INJECT EPIDURAL LUMBAR       Initial /80 (BP Location: Right arm, Patient Position: Chair, Cuff Size: Adult Regular)  Pulse 71  Temp 98.9  F (37.2  C) (Tympanic)  Resp 14  Wt 238 lb 8 oz (108.2 kg)  SpO2 97%  BMI 35.22 kg/m2 Estimated body mass index is 35.22 kg/(m^2) as calculated from the following:    Height as of 5/18/17: 5' 9\" (1.753 m).    Weight as of this encounter: 238 lb 8 oz (108.2 kg).  Medication Reconciliation: complete   Health Maintenance Due   Topic Date Due     OP ANNUAL INR REFERRAL  08/19/2015     DEPRESSION ACTION PLAN Q1 YR  07/07/2016     TETANUS IMMUNIZATION (SYSTEM ASSIGNED)  04/18/2017     PHQ-9 Q6 MONTHS  05/11/2017     ADVANCE DIRECTIVE PLANNING Q5 YRS  05/15/2017     Brittny Gomez, [facility]      "

## 2017-06-06 NOTE — PROGRESS NOTES
[address] [address]  [phone]  Dept: [phone]    PRE-OP EVALUATION:  Today's date: 2017    Endy Navarro (: 1940) presents for pre-operative evaluation assessment as requested by Pawan Campbell MD.  He requires evaluation and anesthesia risk assessment prior to undergoing surgery/procedure for treatment of INJECT EPIDURAL LUMBAR .  Proposed procedure: lumbar epidural steroid injection    Date of Surgery/ Procedure: 17  Time of Surgery/ Procedure: 9:30 am  Hospital/Surgical Facility: [facility]    Primary Physician: Richi Alvarez  Type of Anesthesia Anticipated: Local with MAC    Patient has a Health Care Directive or Living Will:  YES - on file here    1. YES - DO YOU HAVE A HISTORY OF HEART ATTACK, STROKE, STENT, BYPASS OR SURGERY ON AN ARTERY IN THE HEAD, NECK, HEART OR LEG? Heart attack 15-18 yrs ago  2. NO - Do you ever have any pain or discomfort in your chest?  3. YES - DO YOU HAVE A HISTORY OF HEART FAILURE 15-18 yrs ago  4. YES - ARE YOUR TROUBLED BY SHORTNESS OF BREATH WHEN WALKING ON THE LEVEL, UP A SLIGHT HILL OR AT NIGHT? Some mild shortness of breath up a hill  5. NO - Do you currently have a cold, bronchitis or other respiratory infection?  6. YES - DO YOU HAVE A COUGH, SHORTNESS OF BREATH OR WHEEZING? Shortness of breath- mild  7. YES - DO YOU SOMETIMES GET PAINS IN THE CALVES OF YOUR LEGS WHEN YOU WALK? He gets stiffness in calves when walking at times  8. NO - Do you or anyone in your family have previous history of blood clots?  9. NO - Do you or does anyone in your family have a serious bleeding problem such as prolonged bleeding following surgeries or cuts?  10. NO - Have you ever had problems with anemia or been told to take iron pills?  11. NO - Have you had any abnormal blood loss such as black, tarry or bloody stools, or abnormal vaginal bleeding?  12. NO - Have you ever had a blood  transfusion?  13. YES - HAVE YOU OR ANY OF YOUR RELATIVES EVER HAD PROBLEMS WITH ANESTHESIA? He had a problem waking up with an anesthesia- he is unsure of name  14. NO - Do you have sleep apnea, excessive snoring or daytime drowsiness?  15. NO - Do you have any prosthetic heart valves?  16. NO - Do you have prosthetic joints?  17. NO - Is there any chance that you may be pregnant?      HPI:                                                      Brief HPI related to upcoming procedure: needs epidural injection for back and leg pains, due to back pain not walking as much as he used to do.        A-FIB - Patient has a longstanding history of chronic A-fib currently on rate control . Patient does not take coumadin for stroke prevention and denies significant symptoms of lightheadedness, palpitations or dyspnea.                                                                                                                                          .    MEDICAL HISTORY:                                                      Patient Active Problem List    Diagnosis Date Noted     Essential hypertension with goal blood pressure less than 140/90 11/11/2016     Priority: Medium     Major depressive disorder, recurrent episode, mild (H) 11/11/2016     Priority: Medium     Chronic atrial fibrillation (H) 11/11/2016     Priority: Medium     Hypertension goal BP (blood pressure) < 140/90 12/11/2015     Priority: Medium     S/P AVR (aortic valve replacement) 10/05/2015     Priority: Medium     Transient hyperglycemia post procedure 10/05/2015     Priority: Medium     Anemia due to blood loss, acute 10/05/2015     Priority: Medium     Delirium 10/05/2015     Priority: Medium     Persistent atrial fibrillation (H) 10/05/2015     Priority: Medium     Aortic valve stenosis, senile calcific 09/14/2015     Priority: Medium     Mild major depression (H) 10/15/2012     Priority: Medium     Advanced directives, counseling/discussion 05/15/2012      Priority: Medium     Patient states has Advance Directive and will bring in a copy to clinic. 5/15/2012 [facility]         HYPERLIPIDEMIA LDL GOAL <100 10/31/2010     Priority: Medium     Gout      Priority: Medium     Problem list name updated by automated process. Provider to review       Atrial fibrillation (H) 03/30/2007     Priority: Medium     Chronic ischemic heart disease 04/19/2005     Priority: Medium     Problem list name updated by automated process. Provider to review        Past Medical History:   Diagnosis Date     Anxiety state, unspecified      Atrial fibrillation (H)      Bell's palsy 12/12/1997     Bioprosthetic aortic valve replacement during current hospitalization      Coronary atherosclerosis of unspecified type of vessel, native or graft     coronary artery disease with history of MI and stent placement      Gout, unspecified      Hydrocephalus 2015     Old myocardial infarction 3/1998    Hx of MI     Osteoarthrosis, unspecified whether generalized or localized, unspecified site     Diffuse migratory arthritis     Other and unspecified hyperlipidemia      Paroxysmal supraventricular tachycardia (H)     Hx of PAT     Pure hypercholesterolemia      Stented coronary artery      Past Surgical History:   Procedure Laterality Date     C EXPLORATORY OF ABDOMEN  1/25/2007    Exploratory laparotomy.  Lysis of adhesions with reduction of internal hernia.     C ROTATOR CUFF STRAP      s/p rotator cuff surgery     COLONOSCOPY N/A 12/5/2016    Procedure: COMBINED COLONOSCOPY, SINGLE OR MULTIPLE BIOPSY/POLYPECTOMY BY BIOPSY;  Surgeon: Benjamin Cavazos MD;  Location: [facility]     HC COLONOSCOPY THRU STOMA, DIAGNOSTIC  8/23/2004     HC KNEE SCOPE,MED/LAT MENISCUS REPAIR       HC REMOVE TONSILS/ADENOIDS,<13 Y/O      unsure of age     Hydrocephalus  2015    shunt placed     REMOVAL OF SPERM DUCT(S)      Vasectomy     REPLACE VALVE AORTIC N/A 9/14/2015    Procedure: REPLACE VALVE AORTIC;  Surgeon: Sang Chan  MD Wale;  Location:  OR     Current Outpatient Prescriptions   Medication Sig Dispense Refill     finasteride (PROSCAR) 5 MG tablet Take 1 tablet (5 mg) by mouth daily 90 tablet 3     mirtazapine (REMERON) 7.5 MG TABS tablet Take 1 tablet (7.5 mg) by mouth At Bedtime 90 tablet 3     FLUoxetine (PROZAC) 20 MG capsule Take 3 capsules (60 mg) by mouth daily 270 capsule 3     allopurinol (ZYLOPRIM) 100 MG tablet Take 1 tablet (100 mg) by mouth 2 times daily 180 tablet 3     atorvastatin (LIPITOR) 40 MG tablet Take 1 tablet (40 mg) by mouth daily 90 tablet 3     diltiazem (DILACOR XR) 120 MG 24 hr ER capsule Take 1 capsule (120 mg) by mouth daily 90 capsule 3     metoprolol (LOPRESSOR) 50 MG tablet Take 1 tablet (50 mg) by mouth 2 times daily 180 tablet 3     nitroglycerin (NITROSTAT) 0.4 MG SL tablet Place 1 tablet (0.4 mg) under the tongue every 5 minutes as needed for chest pain 25 tablet 4     apixaban ANTICOAGULANT (ELIQUIS) 5 MG tablet Take 1 tablet (5 mg) by mouth 2 times daily 180 tablet 3     acetaminophen 650 MG TABS Take 650 mg by mouth every 6 hours 100 tablet      aspirin EC 81 MG EC tablet Take 1 tablet (81 mg) by mouth daily       polyethylene glycol (MIRALAX/GLYCOLAX) packet Take 17 g by mouth daily 7 packet      OTC products: None, except as noted above    Allergies   Allergen Reactions     Seroquel [Quetiapine] Other (See Comments)     Felt funny      Latex Allergy: NO    Social History   Substance Use Topics     Smoking status: Former Smoker     Smokeless tobacco: Never Used      Comment: quit 25 yr ago     Alcohol use 0.0 oz/week     0 Standard drinks or equivalent per week      Comment: Occ     History   Drug Use No       REVIEW OF SYSTEMS:                                                    Constitutional, neuro, ENT, endocrine, pulmonary, cardiac, gastrointestinal, genitourinary, musculoskeletal, integument and psychiatric systems are negative, except as otherwise noted.    EXAM:                                                     /80 (BP Location: Right arm, Patient Position: Chair, Cuff Size: Adult Regular)  Pulse 71  Temp 98.9  F (37.2  C) (Tympanic)  Resp 14  Wt 238 lb 8 oz (108.2 kg)  SpO2 97%  BMI 35.22 kg/m2    GENERAL APPEARANCE: healthy, alert and no distress     EYES: EOMI,  PERRL     HENT: ear canals and TM's normal and nose and mouth without ulcers or lesions     NECK: no adenopathy, no asymmetry, masses, or scars and thyroid normal to palpation     RESP: lungs clear to auscultation - no rales, rhonchi or wheezes     CV: irregularly irregular rhythm     ABDOMEN:  soft, nontender, no HSM or masses and bowel sounds normal     MS: extremities normal- no gross deformities noted, no evidence of inflammation in joints, FROM in all extremities.     SKIN: no suspicious lesions or rashes     NEURO: Normal strength and tone, sensory exam grossly normal, mentation intact and speech normal     PSYCH: mentation appears normal. and affect normal/bright     LYMPHATICS: No axillary, cervical, or supraclavicular nodes    DIAGNOSTICS:                                                    No labs or EKG required for low risk surgery (cataract, skin procedure, breast biopsy, etc)    Recent Labs   Lab Test  05/03/17   0800  11/11/16   0812  12/11/15   1419   09/15/15   0440   09/14/15   1212   08/14/15   0704   HGB   --   14.9  13.8   < >  11.4*   < >  9.5*   < >  16.1   PLT   --   172  162   < >  122*   < >  54*   --   155   INR   --    --    --    --    --    --   1.65*   --   0.98   NA   --   142  140   < >  142   < >   --    < >  140   POTASSIUM   --   4.6  4.8   < >  4.3   < >   --    < >  5.2   CR  1.34*  1.39*  1.11   < >  1.43*   < >   --    --   1.15   A1C   --    --    --    --   5.5   --    --    --    --     < > = values in this interval not displayed.        IMPRESSION:                                                    Reason for surgery/procedure: back pain    The proposed surgical procedure  is considered LOW risk.    REVISED CARDIAC RISK INDEX  The patient has the following serious cardiovascular risks for perioperative complications such as (MI, PE, VFib and 3  AV Block):  No serious cardiac risks  INTERPRETATION: 2 risks: Class III (moderate risk - 6.6% complication rate)    The patient has the following additional risks for perioperative complications:  No identified additional risks      ICD-10-CM    1. Preop general physical exam Z01.818    2. Mild major depression (H) F32.0 DEPRESSION ACTION PLAN (DAP)       RECOMMENDATIONS:                                                      Stopped the Eliquis for two days before injection.      Cardiovascular Risk  Patient is already on a Beta Blocker. Continue Betablocker therapy after surgery, using Beta blocker order set as necessary for NPO status.      --Patient is to take all scheduled medications on the day of surgery EXCEPT for modifications listed below.    APPROVAL GIVEN to proceed with proposed procedure, without further diagnostic evaluation       Signed Electronically by: Richi Alvarez MD    Copy of this evaluation report is provided to requesting physician.    [facility] Preop Guidelines

## 2017-06-07 ENCOUNTER — HOSPITAL ENCOUNTER (OUTPATIENT)
Dept: PHYSICAL THERAPY | Facility: CLINIC | Age: 77
Setting detail: THERAPIES SERIES
End: 2017-06-07
Attending: PHYSICIAN ASSISTANT
Payer: MEDICARE

## 2017-06-07 PROCEDURE — 40000718 ZZHC STATISTIC PT DEPARTMENT ORTHO VISIT

## 2017-06-07 PROCEDURE — 97110 THERAPEUTIC EXERCISES: CPT | Mod: GP

## 2017-06-07 PROCEDURE — 97140 MANUAL THERAPY 1/> REGIONS: CPT | Mod: GP

## 2017-06-07 ASSESSMENT — PATIENT HEALTH QUESTIONNAIRE - PHQ9: SUM OF ALL RESPONSES TO PHQ QUESTIONS 1-9: 10

## 2017-06-07 NOTE — H&P (VIEW-ONLY)
[address] [address]  [phone]  Dept: [phone]    PRE-OP EVALUATION:  Today's date: 2017    Endy Navarro (: 1940) presents for pre-operative evaluation assessment as requested by Pawan Campbell MD.  He requires evaluation and anesthesia risk assessment prior to undergoing surgery/procedure for treatment of INJECT EPIDURAL LUMBAR .  Proposed procedure: lumbar epidural steroid injection    Date of Surgery/ Procedure: 17  Time of Surgery/ Procedure: 9:30 am  Hospital/Surgical Facility: [facility]    Primary Physician: Richi Alvarez  Type of Anesthesia Anticipated: Local with MAC    Patient has a Health Care Directive or Living Will:  YES - on file here    1. YES - DO YOU HAVE A HISTORY OF HEART ATTACK, STROKE, STENT, BYPASS OR SURGERY ON AN ARTERY IN THE HEAD, NECK, HEART OR LEG? Heart attack 15-18 yrs ago  2. NO - Do you ever have any pain or discomfort in your chest?  3. YES - DO YOU HAVE A HISTORY OF HEART FAILURE 15-18 yrs ago  4. YES - ARE YOUR TROUBLED BY SHORTNESS OF BREATH WHEN WALKING ON THE LEVEL, UP A SLIGHT HILL OR AT NIGHT? Some mild shortness of breath up a hill  5. NO - Do you currently have a cold, bronchitis or other respiratory infection?  6. YES - DO YOU HAVE A COUGH, SHORTNESS OF BREATH OR WHEEZING? Shortness of breath- mild  7. YES - DO YOU SOMETIMES GET PAINS IN THE CALVES OF YOUR LEGS WHEN YOU WALK? He gets stiffness in calves when walking at times  8. NO - Do you or anyone in your family have previous history of blood clots?  9. NO - Do you or does anyone in your family have a serious bleeding problem such as prolonged bleeding following surgeries or cuts?  10. NO - Have you ever had problems with anemia or been told to take iron pills?  11. NO - Have you had any abnormal blood loss such as black, tarry or bloody stools, or abnormal vaginal bleeding?  12. NO - Have you ever had a blood  transfusion?  13. YES - HAVE YOU OR ANY OF YOUR RELATIVES EVER HAD PROBLEMS WITH ANESTHESIA? He had a problem waking up with an anesthesia- he is unsure of name  14. NO - Do you have sleep apnea, excessive snoring or daytime drowsiness?  15. NO - Do you have any prosthetic heart valves?  16. NO - Do you have prosthetic joints?  17. NO - Is there any chance that you may be pregnant?      HPI:                                                      Brief HPI related to upcoming procedure: needs epidural injection for back and leg pains, due to back pain not walking as much as he used to do.        A-FIB - Patient has a longstanding history of chronic A-fib currently on rate control . Patient does not take coumadin for stroke prevention and denies significant symptoms of lightheadedness, palpitations or dyspnea.                                                                                                                                          .    MEDICAL HISTORY:                                                      Patient Active Problem List    Diagnosis Date Noted     Essential hypertension with goal blood pressure less than 140/90 11/11/2016     Priority: Medium     Major depressive disorder, recurrent episode, mild (H) 11/11/2016     Priority: Medium     Chronic atrial fibrillation (H) 11/11/2016     Priority: Medium     Hypertension goal BP (blood pressure) < 140/90 12/11/2015     Priority: Medium     S/P AVR (aortic valve replacement) 10/05/2015     Priority: Medium     Transient hyperglycemia post procedure 10/05/2015     Priority: Medium     Anemia due to blood loss, acute 10/05/2015     Priority: Medium     Delirium 10/05/2015     Priority: Medium     Persistent atrial fibrillation (H) 10/05/2015     Priority: Medium     Aortic valve stenosis, senile calcific 09/14/2015     Priority: Medium     Mild major depression (H) 10/15/2012     Priority: Medium     Advanced directives, counseling/discussion 05/15/2012      Priority: Medium     Patient states has Advance Directive and will bring in a copy to clinic. 5/15/2012 MMJ         HYPERLIPIDEMIA LDL GOAL <100 10/31/2010     Priority: Medium     Gout      Priority: Medium     Problem list name updated by automated process. Provider to review       Atrial fibrillation (H) 03/30/2007     Priority: Medium     Chronic ischemic heart disease 04/19/2005     Priority: Medium     Problem list name updated by automated process. Provider to review        Past Medical History:   Diagnosis Date     Anxiety state, unspecified      Atrial fibrillation (H)      Bell's palsy 12/12/1997     Bioprosthetic aortic valve replacement during current hospitalization      Coronary atherosclerosis of unspecified type of vessel, native or graft     coronary artery disease with history of MI and stent placement      Gout, unspecified      Hydrocephalus 2015     Old myocardial infarction 3/1998    Hx of MI     Osteoarthrosis, unspecified whether generalized or localized, unspecified site     Diffuse migratory arthritis     Other and unspecified hyperlipidemia      Paroxysmal supraventricular tachycardia (H)     Hx of PAT     Pure hypercholesterolemia      Stented coronary artery      Past Surgical History:   Procedure Laterality Date     C EXPLORATORY OF ABDOMEN  1/25/2007    Exploratory laparotomy.  Lysis of adhesions with reduction of internal hernia.     C ROTATOR CUFF STRAP      s/p rotator cuff surgery     COLONOSCOPY N/A 12/5/2016    Procedure: COMBINED COLONOSCOPY, SINGLE OR MULTIPLE BIOPSY/POLYPECTOMY BY BIOPSY;  Surgeon: Benjamin Cavazos MD;  Location: [facility]     HC COLONOSCOPY THRU STOMA, DIAGNOSTIC  8/23/2004     HC KNEE SCOPE,MED/LAT MENISCUS REPAIR       HC REMOVE TONSILS/ADENOIDS,<13 Y/O      unsure of age     Hydrocephalus  2015    shunt placed     REMOVAL OF SPERM DUCT(S)      Vasectomy     REPLACE VALVE AORTIC N/A 9/14/2015    Procedure: REPLACE VALVE AORTIC;  Surgeon: Sang Chan  MD Wale;  Location:  OR     Current Outpatient Prescriptions   Medication Sig Dispense Refill     finasteride (PROSCAR) 5 MG tablet Take 1 tablet (5 mg) by mouth daily 90 tablet 3     mirtazapine (REMERON) 7.5 MG TABS tablet Take 1 tablet (7.5 mg) by mouth At Bedtime 90 tablet 3     FLUoxetine (PROZAC) 20 MG capsule Take 3 capsules (60 mg) by mouth daily 270 capsule 3     allopurinol (ZYLOPRIM) 100 MG tablet Take 1 tablet (100 mg) by mouth 2 times daily 180 tablet 3     atorvastatin (LIPITOR) 40 MG tablet Take 1 tablet (40 mg) by mouth daily 90 tablet 3     diltiazem (DILACOR XR) 120 MG 24 hr ER capsule Take 1 capsule (120 mg) by mouth daily 90 capsule 3     metoprolol (LOPRESSOR) 50 MG tablet Take 1 tablet (50 mg) by mouth 2 times daily 180 tablet 3     nitroglycerin (NITROSTAT) 0.4 MG SL tablet Place 1 tablet (0.4 mg) under the tongue every 5 minutes as needed for chest pain 25 tablet 4     apixaban ANTICOAGULANT (ELIQUIS) 5 MG tablet Take 1 tablet (5 mg) by mouth 2 times daily 180 tablet 3     acetaminophen 650 MG TABS Take 650 mg by mouth every 6 hours 100 tablet      aspirin EC 81 MG EC tablet Take 1 tablet (81 mg) by mouth daily       polyethylene glycol (MIRALAX/GLYCOLAX) packet Take 17 g by mouth daily 7 packet      OTC products: None, except as noted above    Allergies   Allergen Reactions     Seroquel [Quetiapine] Other (See Comments)     Felt funny      Latex Allergy: NO    Social History   Substance Use Topics     Smoking status: Former Smoker     Smokeless tobacco: Never Used      Comment: quit 25 yr ago     Alcohol use 0.0 oz/week     0 Standard drinks or equivalent per week      Comment: Occ     History   Drug Use No       REVIEW OF SYSTEMS:                                                    Constitutional, neuro, ENT, endocrine, pulmonary, cardiac, gastrointestinal, genitourinary, musculoskeletal, integument and psychiatric systems are negative, except as otherwise noted.    EXAM:                                                     /80 (BP Location: Right arm, Patient Position: Chair, Cuff Size: Adult Regular)  Pulse 71  Temp 98.9  F (37.2  C) (Tympanic)  Resp 14  Wt 238 lb 8 oz (108.2 kg)  SpO2 97%  BMI 35.22 kg/m2    GENERAL APPEARANCE: healthy, alert and no distress     EYES: EOMI,  PERRL     HENT: ear canals and TM's normal and nose and mouth without ulcers or lesions     NECK: no adenopathy, no asymmetry, masses, or scars and thyroid normal to palpation     RESP: lungs clear to auscultation - no rales, rhonchi or wheezes     CV: irregularly irregular rhythm     ABDOMEN:  soft, nontender, no HSM or masses and bowel sounds normal     MS: extremities normal- no gross deformities noted, no evidence of inflammation in joints, FROM in all extremities.     SKIN: no suspicious lesions or rashes     NEURO: Normal strength and tone, sensory exam grossly normal, mentation intact and speech normal     PSYCH: mentation appears normal. and affect normal/bright     LYMPHATICS: No axillary, cervical, or supraclavicular nodes    DIAGNOSTICS:                                                    No labs or EKG required for low risk surgery (cataract, skin procedure, breast biopsy, etc)    Recent Labs   Lab Test  05/03/17   0800  11/11/16   0812  12/11/15   1419   09/15/15   0440   09/14/15   1212   08/14/15   0704   HGB   --   14.9  13.8   < >  11.4*   < >  9.5*   < >  16.1   PLT   --   172  162   < >  122*   < >  54*   --   155   INR   --    --    --    --    --    --   1.65*   --   0.98   NA   --   142  140   < >  142   < >   --    < >  140   POTASSIUM   --   4.6  4.8   < >  4.3   < >   --    < >  5.2   CR  1.34*  1.39*  1.11   < >  1.43*   < >   --    --   1.15   A1C   --    --    --    --   5.5   --    --    --    --     < > = values in this interval not displayed.        IMPRESSION:                                                    Reason for surgery/procedure: back pain    The proposed surgical procedure  is considered LOW risk.    REVISED CARDIAC RISK INDEX  The patient has the following serious cardiovascular risks for perioperative complications such as (MI, PE, VFib and 3  AV Block):  No serious cardiac risks  INTERPRETATION: 2 risks: Class III (moderate risk - 6.6% complication rate)    The patient has the following additional risks for perioperative complications:  No identified additional risks      ICD-10-CM    1. Preop general physical exam Z01.818    2. Mild major depression (H) F32.0 DEPRESSION ACTION PLAN (DAP)       RECOMMENDATIONS:                                                      Stopped the Eliquis for two days before injection.      Cardiovascular Risk  Patient is already on a Beta Blocker. Continue Betablocker therapy after surgery, using Beta blocker order set as necessary for NPO status.      --Patient is to take all scheduled medications on the day of surgery EXCEPT for modifications listed below.    APPROVAL GIVEN to proceed with proposed procedure, without further diagnostic evaluation       Signed Electronically by: Richi Alvarez MD    Copy of this evaluation report is provided to requesting physician.    [place] Preop Guidelines

## 2017-06-08 ENCOUNTER — HOSPITAL ENCOUNTER (OUTPATIENT)
Facility: CLINIC | Age: 77
Discharge: HOME | End: 2017-06-08
Attending: ANESTHESIOLOGY | Admitting: ANESTHESIOLOGY
Payer: MEDICARE

## 2017-06-08 ENCOUNTER — ANESTHESIA (OUTPATIENT)
Dept: SURGERY | Facility: CLINIC | Age: 77
End: 2017-06-08
Payer: MEDICARE

## 2017-06-08 ENCOUNTER — HOSPITAL ENCOUNTER (OUTPATIENT)
Dept: GENERAL RADIOLOGY | Facility: CLINIC | Age: 77
End: 2017-06-08
Attending: ANESTHESIOLOGY
Payer: MEDICARE

## 2017-06-08 ENCOUNTER — ANESTHESIA EVENT (OUTPATIENT)
Dept: SURGERY | Facility: CLINIC | Age: 77
End: 2017-06-08
Payer: MEDICARE

## 2017-06-08 ENCOUNTER — SURGERY (OUTPATIENT)
Age: 77
End: 2017-06-08

## 2017-06-08 VITALS
RESPIRATION RATE: 16 BRPM | TEMPERATURE: 97.6 F | SYSTOLIC BLOOD PRESSURE: 111 MMHG | OXYGEN SATURATION: 98 % | DIASTOLIC BLOOD PRESSURE: 85 MMHG

## 2017-06-08 DIAGNOSIS — M54.16 LUMBAR RADICULOPATHY: ICD-10-CM

## 2017-06-08 PROCEDURE — 25000128 H RX IP 250 OP 636: Performed by: ANESTHESIOLOGY

## 2017-06-08 PROCEDURE — 62323 NJX INTERLAMINAR LMBR/SAC: CPT | Mod: LT | Performed by: ANESTHESIOLOGY

## 2017-06-08 PROCEDURE — 25000125 ZZHC RX 250: Performed by: NURSE ANESTHETIST, CERTIFIED REGISTERED

## 2017-06-08 PROCEDURE — 62323 NJX INTERLAMINAR LMBR/SAC: CPT | Performed by: ANESTHESIOLOGY

## 2017-06-08 PROCEDURE — 25000128 H RX IP 250 OP 636: Performed by: NURSE ANESTHETIST, CERTIFIED REGISTERED

## 2017-06-08 PROCEDURE — 25000125 ZZHC RX 250: Performed by: ANESTHESIOLOGY

## 2017-06-08 PROCEDURE — 77003 FLUOROGUIDE FOR SPINE INJECT: CPT | Mod: TC

## 2017-06-08 PROCEDURE — 37000008 ZZH ANESTHESIA TECHNICAL FEE, 1ST 30 MIN: Performed by: ANESTHESIOLOGY

## 2017-06-08 RX ORDER — LIDOCAINE 40 MG/G
CREAM TOPICAL
Status: DISCONTINUED | OUTPATIENT
Start: 2017-06-08 | End: 2017-06-08 | Stop reason: HOSPADM

## 2017-06-08 RX ORDER — IOPAMIDOL 612 MG/ML
INJECTION, SOLUTION INTRATHECAL PRN
Status: DISCONTINUED | OUTPATIENT
Start: 2017-06-08 | End: 2017-06-08 | Stop reason: HOSPADM

## 2017-06-08 RX ORDER — TRIAMCINOLONE ACETONIDE 40 MG/ML
INJECTION, SUSPENSION INTRA-ARTICULAR; INTRAMUSCULAR PRN
Status: DISCONTINUED | OUTPATIENT
Start: 2017-06-08 | End: 2017-06-08 | Stop reason: HOSPADM

## 2017-06-08 RX ORDER — PROPOFOL 10 MG/ML
INJECTION, EMULSION INTRAVENOUS PRN
Status: DISCONTINUED | OUTPATIENT
Start: 2017-06-08 | End: 2017-06-08

## 2017-06-08 RX ORDER — SODIUM CHLORIDE, SODIUM LACTATE, POTASSIUM CHLORIDE, CALCIUM CHLORIDE 600; 310; 30; 20 MG/100ML; MG/100ML; MG/100ML; MG/100ML
INJECTION, SOLUTION INTRAVENOUS CONTINUOUS
Status: DISCONTINUED | OUTPATIENT
Start: 2017-06-08 | End: 2017-06-08 | Stop reason: HOSPADM

## 2017-06-08 RX ORDER — LIDOCAINE HYDROCHLORIDE 20 MG/ML
INJECTION, SOLUTION INFILTRATION; PERINEURAL PRN
Status: DISCONTINUED | OUTPATIENT
Start: 2017-06-08 | End: 2017-06-08

## 2017-06-08 RX ADMIN — PROPOFOL 50 MG: 10 INJECTION, EMULSION INTRAVENOUS at 09:57

## 2017-06-08 RX ADMIN — IOPAMIDOL 3 ML: 612 INJECTION, SOLUTION INTRATHECAL at 10:02

## 2017-06-08 RX ADMIN — PROPOFOL 50 MG: 10 INJECTION, EMULSION INTRAVENOUS at 09:59

## 2017-06-08 RX ADMIN — TRIAMCINOLONE ACETONIDE 40 MG: 40 INJECTION, SUSPENSION INTRA-ARTICULAR; INTRAMUSCULAR at 09:59

## 2017-06-08 RX ADMIN — SODIUM BICARBONATE 50 MEQ: 84 INJECTION, SOLUTION INTRAVENOUS at 10:02

## 2017-06-08 RX ADMIN — PROPOFOL 50 MG: 10 INJECTION, EMULSION INTRAVENOUS at 09:53

## 2017-06-08 RX ADMIN — LIDOCAINE HYDROCHLORIDE 1 ML: 10 INJECTION, SOLUTION EPIDURAL; INFILTRATION; INTRACAUDAL; PERINEURAL at 09:19

## 2017-06-08 RX ADMIN — PROPOFOL 50 MG: 10 INJECTION, EMULSION INTRAVENOUS at 09:55

## 2017-06-08 RX ADMIN — LIDOCAINE HYDROCHLORIDE 40 MG: 20 INJECTION, SOLUTION INFILTRATION; PERINEURAL at 09:53

## 2017-06-08 RX ADMIN — SODIUM CHLORIDE, POTASSIUM CHLORIDE, SODIUM LACTATE AND CALCIUM CHLORIDE: 600; 310; 30; 20 INJECTION, SOLUTION INTRAVENOUS at 09:19

## 2017-06-08 ASSESSMENT — LIFESTYLE VARIABLES: TOBACCO_USE: 1

## 2017-06-08 ASSESSMENT — ENCOUNTER SYMPTOMS: DYSRHYTHMIAS: 1

## 2017-06-08 NOTE — ANESTHESIA PREPROCEDURE EVALUATION
Anesthesia Evaluation     . Pt has had prior anesthetic. Type: General           ROS/MED HX    ENT/Pulmonary:     (+)tobacco use, Past use , . .    Neurologic:     (+)other neuro Hydrocephalus -  shunt    Cardiovascular:     (+) Dyslipidemia, hypertension-Peripheral Vascular Disease---. : . . . :. dysrhythmias a-fib, valvular problems/murmurs type: AS aortic valve replacement:. Previous cardiac testing date:results:date: results:ECG reviewed date:a-fib results: date: results:          METS/Exercise Tolerance:     Hematologic:     (+) Anemia, -      Musculoskeletal:  - neg musculoskeletal ROS       GI/Hepatic:  - neg GI/hepatic ROS       Renal/Genitourinary:  - ROS Renal section negative       Endo:  - neg endo ROS       Psychiatric:     (+) psychiatric history depression      Infectious Disease:  - neg infectious disease ROS       Malignancy:         Other:    - neg other ROS                 Physical Exam  Normal systems: cardiovascular, pulmonary and dental    Airway   Mallampati: III  TM distance: >3 FB  Neck ROM: full    Dental   (+) partials    Cardiovascular   Rhythm and rate: regular and normal      Pulmonary    breath sounds clear to auscultation                    Anesthesia Plan      History & Physical Review  History and physical reviewed and following examination; no interval change.    ASA Status:  3 .    NPO Status:  > 8 hours    Plan for MAC with Propofol induction. Maintenance will be Balanced.  Reason for MAC:  Deep or markedly invasive procedure (G8)         Postoperative Care  Postoperative pain management:  IV analgesics.      Consents  Anesthetic plan, risks, benefits and alternatives discussed with:  Patient..                          .

## 2017-06-08 NOTE — ANESTHESIA POSTPROCEDURE EVALUATION
Patient: Endy Navarro    Procedure(s):  lumbar epidural steroid injection - Wound Class: I-Clean    Diagnosis:lumbar radiculopathy  Diagnosis Additional Information: No value filed.    Anesthesia Type:  MAC    Note:  Anesthesia Post Evaluation    Patient location during evaluation: Phase 2  Patient participation: Able to fully participate in evaluation  Level of consciousness: awake and alert  Pain management: adequate  Airway patency: patent  Cardiovascular status: acceptable  Respiratory status: acceptable  Hydration status: acceptable  PONV: none     Anesthetic complications: None          Last vitals:  Vitals:    06/08/17 0907   BP: 130/87   Resp: 16   Temp: 97.6  F (36.4  C)         Electronically Signed By: MYKEL Bhatti CRNA  June 8, 2017  10:10 AM

## 2017-06-08 NOTE — IP AVS SNAPSHOT
[facility] Phase II    [address]     [place] [address]    Phone:  [phone]                                       After Visit Summary   6/8/2017    Endy Navarro    MRN: [id number]           After Visit Summary Signature Page     I have received my discharge instructions, and my questions have been answered. I have discussed any challenges I see with this plan with the nurse or doctor.    ..........................................................................................................................................  Patient/Patient Representative Signature      ..........................................................................................................................................  Patient Representative Print Name and Relationship to Patient    ..................................................               ................................................  Date                                            Time    ..........................................................................................................................................  Reviewed by Signature/Title    ...................................................              ..............................................  Date                                                            Time

## 2017-06-08 NOTE — ANESTHESIA CARE TRANSFER NOTE
Patient: Endy Navarro    Procedure(s):  lumbar epidural steroid injection - Wound Class: I-Clean    Diagnosis: lumbar radiculopathy  Diagnosis Additional Information: No value filed.    Anesthesia Type:   MAC     Note:  Airway :Nasal Cannula  Patient transferred to:Phase II        Vitals: (Last set prior to Anesthesia Care Transfer)    CRNA VITALS  6/8/2017 0935 - 6/8/2017 1009      6/8/2017             Pulse: 75    SpO2: 95 %    Resp Rate (observed): 17                Electronically Signed By: MYKEL Bhatti CRNA  June 8, 2017  10:09 AM

## 2017-06-08 NOTE — IP AVS SNAPSHOT
MRN:[id number]                      After Visit Summary   6/8/2017    Endy Navarro    MRN: [id number]           Thank you!     Thank you for choosing [facility] for your care. Our goal is always to provide you with excellent care. Hearing back from our patients is one way we can continue to improve our services. Please take a few minutes to complete the written survey that you may receive in the mail after you visit with us. Thank you!        Patient Information     Date Of Birth          1940        About your hospital stay     You were admitted on:  June 8, 2017 You last received care in the:  [facility]    You were discharged on:  June 8, 2017       Who to Call     For medical emergencies, please call 911.  For non-urgent questions about your medical care, please call your primary care provider or clinic, [phone]  For questions related to your surgery, please call your surgery clinic        Attending Provider     Provider Specialty    Pawan Davenport [facility]       Primary Care Provider Office Phone # Fax #    Richi Alvarez [phone]      After Care Instructions     Discharge Instructions       Review outpatient procedure discharge instructions as directed by Provider.                  Your next 10 appointments already scheduled     Jun 12, 2017  8:15 AM CDT   Ortho Treatment with Elisha Ling, PT   [facility] ([facility])    [address] [place] [address]   [phone]            Jun 14, 2017  8:30 AM CDT   Ortho Treatment with Elisha Ling, PT   [facility] ([facility])    [address]  [place] [address]   [phone]            Jun 19, 2017  8:30 AM CDT   Ortho Treatment with Elisha Ling, PT   [facility] ([facility])    [address] [place] [address]   [phone]            Jun  21, 2017  8:30 AM CDT   Ortho Treatment with Elisha Ling, PT   [facility] ([facility])    [address] [place] [address]   [phone]            Jun 26, 2017  8:30 AM CDT   Ortho Treatment with Elisha Ling, PT   [facility] ([facility])    [address] [place] [address]   [phone]            Jun 28, 2017  8:30 AM CDT   Ortho Treatment with Elisha Ling, PT   [facility] ([facility])    [address] [place] [address]   [phone]              Further instructions from your care team       [facility] Care Instructions    [facility]  [phone]               Procedure:  Epidural Spine Injection or Joint injection    Activity:    Rest today    Do not work today    Resume normal activity tomorrow    Pain:    You may experience soreness at the injection site for one or two days    You may use an ice pack for 20 minutes every 2 hours for the first 24 hours    You may use a heating pad after the first 24 hours    You may use Tylenol  (acetaminophen) every 4 hours or other pain medicines as directed by your physician    Safety  Sedation medicine, if given may remain active for many hours.    It is important for the next 24 hours that you do not:    Drive a car    Operate machines or power tools    Consume alcohol, including beer    Sign any important papers or legal documents    You may experience numbness radiating into your legs or arms, (depending on the procedure location)  This numbness may last several hours.  Until the numb sensation returns to normal please use caution in walking, climbing stairs, stepping out of your vehicle, etc.    Common side effects of steroids:  Not everyone will experience corticosteroid side effects. If side effects are experienced they will gradually subside in the 7-10 day period following an injection.    Most  common side effects include:    Flushed face and/or chest    Feeling of warmth, particularly in face but could be overall feeling of warmth    Increased blood sugar in diabetic patients    Menstrual irregularities may occur.  If taking hormone based birth control an alternate method of birth control is recommended    Sleep disturbances and/or mood swings are possible    Leg cramps    Please contact us if you have:  Severe pain   Fever more than 101.5 degrees Fahrenheit  Signs of infection (redness, swelling or drainage)      If you have questions, please contact our office at [phone] between the hours of 8:00am and 5:00pm Monday through Friday.  After office hours you can contact the on call provider by dialing [phone].  If you need immediate attention we recommend that you go to a hospital emergency room or dial [phone].                 Pending Results     No orders found from 6/6/2017 to 6/9/2017.            Admission Information     Date & Time Provider Department Dept. Phone    6/8/2017 Pawan Davenport MD [facility] Phase [phone]      Your Vitals Were     Blood Pressure Temperature Respirations             101/68 97.6  F (36.4  C) (Oral) 14         MyChart Information     [facility] gives you secure access to your electronic health record. If you see a primary care provider, you can also send messages to your care team and make appointments. If you have questions, please call your primary care clinic.  If you do not have a primary care provider, please call [phone] and they will assist you.        Care EveryWhere ID     This is your Care EveryWhere ID. This could be used by other organizations to access your [facility] medical records  [id number]           Review of your medicines      CONTINUE these medicines which have NOT CHANGED        Dose / Directions    acetaminophen 650 MG 8 hour tablet   Used for:  S/P AVR        Dose:  650 mg   Take 650 mg by mouth every 6 hours   Quantity:  100  tablet   Refills:  0       allopurinol 100 MG tablet   Commonly known as:  ZYLOPRIM   Used for:  Acute gouty arthritis        Dose:  100 mg   Take 1 tablet (100 mg) by mouth 2 times daily   Quantity:  180 tablet   Refills:  3       apixaban ANTICOAGULANT 5 MG tablet   Commonly known as:  ELIQUIS   Used for:  S/P AVR        Dose:  5 mg   Take 1 tablet (5 mg) by mouth 2 times daily   Quantity:  180 tablet   Refills:  3       aspirin 81 MG EC tablet   Used for:  S/P AVR        Dose:  81 mg   Take 1 tablet (81 mg) by mouth daily   Refills:  0       atorvastatin 40 MG tablet   Commonly known as:  LIPITOR   Used for:  Hyperlipidemia LDL goal <100        Dose:  40 mg   Take 1 tablet (40 mg) by mouth daily   Quantity:  90 tablet   Refills:  3       diltiazem 120 MG 24 hr capsule   Commonly known as:  DILACOR XR   Used for:  S/P AVR        Dose:  120 mg   Take 1 capsule (120 mg) by mouth daily   Quantity:  90 capsule   Refills:  3       finasteride 5 MG tablet   Commonly known as:  PROSCAR   Used for:  Benign nodular prostatic hyperplasia with lower urinary tract symptoms        Dose:  5 mg   Take 1 tablet (5 mg) by mouth daily   Quantity:  90 tablet   Refills:  3       FLUoxetine 20 MG capsule   Commonly known as:  PROzac   Used for:  S/P AVR        Dose:  60 mg   Take 3 capsules (60 mg) by mouth daily   Quantity:  270 capsule   Refills:  3       metoprolol 50 MG tablet   Commonly known as:  LOPRESSOR   Used for:  S/P AVR        Dose:  50 mg   Take 1 tablet (50 mg) by mouth 2 times daily   Quantity:  180 tablet   Refills:  3       mirtazapine 7.5 MG Tabs tablet   Commonly known as:  REMERON   Used for:  S/P AVR        Dose:  7.5 mg   Take 1 tablet (7.5 mg) by mouth At Bedtime   Quantity:  90 tablet   Refills:  3       nitroglycerin 0.4 MG sublingual tablet   Commonly known as:  NITROSTAT   Used for:  Coronary artery disease involving native coronary artery of native heart without angina pectoris        Dose:  0.4 mg   Place  1 tablet (0.4 mg) under the tongue every 5 minutes as needed for chest pain   Quantity:  25 tablet   Refills:  4       polyethylene glycol Packet   Commonly known as:  MIRALAX/GLYCOLAX   Used for:  S/P AVR        Dose:  17 g   Take 17 g by mouth daily   Quantity:  7 packet   Refills:  0                Protect others around you: Learn how to safely use, store and throw away your medicines at www.disposemymeds.org.             Medication List: This is a list of all your medications and when to take them. Check marks below indicate your daily home schedule. Keep this list as a reference.      Medications           Morning Afternoon Evening Bedtime As Needed    acetaminophen 650 MG 8 hour tablet   Take 650 mg by mouth every 6 hours                                allopurinol 100 MG tablet   Commonly known as:  ZYLOPRIM   Take 1 tablet (100 mg) by mouth 2 times daily                                apixaban ANTICOAGULANT 5 MG tablet   Commonly known as:  ELIQUIS   Take 1 tablet (5 mg) by mouth 2 times daily                                aspirin 81 MG EC tablet   Take 1 tablet (81 mg) by mouth daily                                atorvastatin 40 MG tablet   Commonly known as:  LIPITOR   Take 1 tablet (40 mg) by mouth daily                                diltiazem 120 MG 24 hr capsule   Commonly known as:  DILACOR XR   Take 1 capsule (120 mg) by mouth daily                                finasteride 5 MG tablet   Commonly known as:  PROSCAR   Take 1 tablet (5 mg) by mouth daily                                FLUoxetine 20 MG capsule   Commonly known as:  PROzac   Take 3 capsules (60 mg) by mouth daily                                metoprolol 50 MG tablet   Commonly known as:  LOPRESSOR   Take 1 tablet (50 mg) by mouth 2 times daily                                mirtazapine 7.5 MG Tabs tablet   Commonly known as:  REMERON   Take 1 tablet (7.5 mg) by mouth At Bedtime                                nitroglycerin 0.4 MG  sublingual tablet   Commonly known as:  NITROSTAT   Place 1 tablet (0.4 mg) under the tongue every 5 minutes as needed for chest pain                                polyethylene glycol Packet   Commonly known as:  MIRALAX/GLYCOLAX   Take 17 g by mouth daily

## 2017-06-08 NOTE — DISCHARGE INSTRUCTIONS
Home Care Instructions    [facility]  [phone]               Procedure:  Epidural Spine Injection or Joint injection    Activity:    Rest today    Do not work today    Resume normal activity tomorrow    Pain:    You may experience soreness at the injection site for one or two days    You may use an ice pack for 20 minutes every 2 hours for the first 24 hours    You may use a heating pad after the first 24 hours    You may use Tylenol  (acetaminophen) every 4 hours or other pain medicines as directed by your physician    Safety  Sedation medicine, if given may remain active for many hours.    It is important for the next 24 hours that you do not:    Drive a car    Operate machines or power tools    Consume alcohol, including beer    Sign any important papers or legal documents    You may experience numbness radiating into your legs or arms, (depending on the procedure location)  This numbness may last several hours.  Until the numb sensation returns to normal please use caution in walking, climbing stairs, stepping out of your vehicle, etc.    Common side effects of steroids:  Not everyone will experience corticosteroid side effects. If side effects are experienced they will gradually subside in the 7-10 day period following an injection.    Most common side effects include:    Flushed face and/or chest    Feeling of warmth, particularly in face but could be overall feeling of warmth    Increased blood sugar in diabetic patients    Menstrual irregularities may occur.  If taking hormone based birth control an alternate method of birth control is recommended    Sleep disturbances and/or mood swings are possible    Leg cramps    Please contact us if you have:  Severe pain   Fever more than 101.5 degrees Fahrenheit  Signs of infection (redness, swelling or drainage)      If you have questions, please contact our office at [phone] between the hours of 8:00am and 5:00pm Monday through Friday.  After  office hours you can contact the on call provider by dialing [phone].  If you need immediate attention we recommend that you go to a hospital emergency room or dial [phone].

## 2017-06-12 ENCOUNTER — HOSPITAL ENCOUNTER (OUTPATIENT)
Dept: PHYSICAL THERAPY | Facility: CLINIC | Age: 77
Setting detail: THERAPIES SERIES
End: 2017-06-12
Attending: PHYSICIAN ASSISTANT
Payer: MEDICARE

## 2017-06-12 PROCEDURE — 40000718 ZZHC STATISTIC PT DEPARTMENT ORTHO VISIT

## 2017-06-12 PROCEDURE — 97110 THERAPEUTIC EXERCISES: CPT | Mod: GP

## 2017-06-12 PROCEDURE — 97140 MANUAL THERAPY 1/> REGIONS: CPT | Mod: GP

## 2017-06-14 ENCOUNTER — HOSPITAL ENCOUNTER (OUTPATIENT)
Dept: PHYSICAL THERAPY | Facility: CLINIC | Age: 77
Setting detail: THERAPIES SERIES
End: 2017-06-14
Attending: PHYSICIAN ASSISTANT
Payer: MEDICARE

## 2017-06-14 PROCEDURE — 97110 THERAPEUTIC EXERCISES: CPT | Mod: GP

## 2017-06-14 PROCEDURE — 97140 MANUAL THERAPY 1/> REGIONS: CPT | Mod: GP

## 2017-06-14 PROCEDURE — 40000718 ZZHC STATISTIC PT DEPARTMENT ORTHO VISIT

## 2017-06-19 ENCOUNTER — HOSPITAL ENCOUNTER (OUTPATIENT)
Dept: PHYSICAL THERAPY | Facility: CLINIC | Age: 77
Setting detail: THERAPIES SERIES
End: 2017-06-19
Attending: PHYSICIAN ASSISTANT
Payer: MEDICARE

## 2017-06-19 PROCEDURE — 97110 THERAPEUTIC EXERCISES: CPT | Mod: GP

## 2017-06-19 PROCEDURE — 97140 MANUAL THERAPY 1/> REGIONS: CPT | Mod: GP

## 2017-06-19 PROCEDURE — 40000718 ZZHC STATISTIC PT DEPARTMENT ORTHO VISIT

## 2017-06-21 ENCOUNTER — HOSPITAL ENCOUNTER (OUTPATIENT)
Dept: PHYSICAL THERAPY | Facility: CLINIC | Age: 77
Setting detail: THERAPIES SERIES
End: 2017-06-21
Attending: PHYSICIAN ASSISTANT
Payer: MEDICARE

## 2017-06-21 PROCEDURE — 40000718 ZZHC STATISTIC PT DEPARTMENT ORTHO VISIT

## 2017-06-21 PROCEDURE — 97110 THERAPEUTIC EXERCISES: CPT | Mod: GP

## 2017-06-26 ENCOUNTER — HOSPITAL ENCOUNTER (OUTPATIENT)
Dept: PHYSICAL THERAPY | Facility: CLINIC | Age: 77
Setting detail: THERAPIES SERIES
End: 2017-06-26
Attending: PHYSICIAN ASSISTANT
Payer: MEDICARE

## 2017-06-26 PROCEDURE — 40000718 ZZHC STATISTIC PT DEPARTMENT ORTHO VISIT

## 2017-06-26 PROCEDURE — 97110 THERAPEUTIC EXERCISES: CPT | Mod: GP

## 2017-06-28 ENCOUNTER — HOSPITAL ENCOUNTER (OUTPATIENT)
Dept: PHYSICAL THERAPY | Facility: CLINIC | Age: 77
Setting detail: THERAPIES SERIES
End: 2017-06-28
Attending: PHYSICIAN ASSISTANT
Payer: MEDICARE

## 2017-06-28 PROCEDURE — 40000718 ZZHC STATISTIC PT DEPARTMENT ORTHO VISIT

## 2017-06-28 PROCEDURE — 97110 THERAPEUTIC EXERCISES: CPT | Mod: GP

## 2017-07-05 ENCOUNTER — HOSPITAL ENCOUNTER (OUTPATIENT)
Dept: PHYSICAL THERAPY | Facility: CLINIC | Age: 77
Setting detail: THERAPIES SERIES
End: 2017-07-05
Attending: PHYSICIAN ASSISTANT
Payer: MEDICARE

## 2017-07-05 PROCEDURE — G8979 MOBILITY GOAL STATUS: HCPCS | Mod: GP,CI

## 2017-07-05 PROCEDURE — 97110 THERAPEUTIC EXERCISES: CPT | Mod: GP

## 2017-07-05 PROCEDURE — 40000718 ZZHC STATISTIC PT DEPARTMENT ORTHO VISIT

## 2017-07-05 PROCEDURE — 97112 NEUROMUSCULAR REEDUCATION: CPT | Mod: GP

## 2017-07-05 PROCEDURE — G8978 MOBILITY CURRENT STATUS: HCPCS | Mod: GP,CJ

## 2017-07-05 NOTE — PROGRESS NOTES
Outpatient Physical Therapy Progress Note     Patient: Endy Navarro  : 1940    Beginning/End Dates of Reporting Period:  2017 to 2017    Referring Provider: Long Mckee PA-C    Therapy Diagnosis: impaired balance, Pain in L low back, buttock and posterior thigh.consistent with sciatic nerve pain.     Client Self Report: Pt reports he is feeling stiff today and his knee is starting to hurt. He states that it has been painful on and off for a few years now.  He was not able to get as many exercises in as usual due to increased activities at home.    Objective Measurements:  Objective Measure: Pain  Details: best: 0/10 worst: 3-4/10 current: 2/10 (on Nustep) (Feels stiff when up and moving around)     Outcome Measures (most recent score 17):  Lyric STarT: 0/2/Low    CECELIA 10%       Goals:  Goal Identifier Walking   Goal Description Pt able to walk for 1 mile with out increased back or leg pain   Target Date 17   Date Met  17   Progress: GOAL MET     Goal Identifier Stairs   Goal Description Pt able to climb stairs reciprocally  in order to access basement (Occasionally needs to return to marked timing.)   Target Date 17   Date Met      Progress: Occasionally reverts to marked timing     Goal Identifier HEP   Goal Description Indep with HEP for core stabilization and strengthening    Target Date 17   Date Met      Progress: Not consistent with completing provided HEP.     Goal Identifier Sleeping   Goal Description pt sleeps in a recliner and has for many years.  Pt report that he sleeps poorly most of the time   Target Date 17   Date Met      Progress:Continues to sleep in recliner majority of night.       Progress Toward Goals:   Progress this reporting period: Pt has been able to increase walking distance. He is able to use reciprocal pattern on stairs but will still occasionally need to revert back to marked timing. Continues to sleep in recliner. Cues provided  for HEP.      Plan:  Changes to therapy plan of care: Decrease to 1-2 x per week    Discharge:  No

## 2017-07-12 ENCOUNTER — HOSPITAL ENCOUNTER (OUTPATIENT)
Dept: PHYSICAL THERAPY | Facility: CLINIC | Age: 77
Setting detail: THERAPIES SERIES
End: 2017-07-12
Attending: PHYSICIAN ASSISTANT
Payer: MEDICARE

## 2017-07-12 PROCEDURE — 40000718 ZZHC STATISTIC PT DEPARTMENT ORTHO VISIT

## 2017-07-12 PROCEDURE — 97110 THERAPEUTIC EXERCISES: CPT | Mod: GP

## 2017-07-19 ENCOUNTER — HOSPITAL ENCOUNTER (OUTPATIENT)
Dept: PHYSICAL THERAPY | Facility: CLINIC | Age: 77
Setting detail: THERAPIES SERIES
End: 2017-07-19
Attending: PHYSICIAN ASSISTANT
Payer: MEDICARE

## 2017-07-19 PROCEDURE — 97112 NEUROMUSCULAR REEDUCATION: CPT | Mod: GP

## 2017-07-19 PROCEDURE — 97110 THERAPEUTIC EXERCISES: CPT | Mod: GP

## 2017-07-19 PROCEDURE — 40000718 ZZHC STATISTIC PT DEPARTMENT ORTHO VISIT

## 2017-07-25 NOTE — ADDENDUM NOTE
Encounter addended by: Waleska Coles, PT on: 7/25/2017  7:47 AM<BR>     Actions taken: Flowsheet accepted, Charge Capture section accepted

## 2017-07-26 ENCOUNTER — HOSPITAL ENCOUNTER (OUTPATIENT)
Dept: PHYSICAL THERAPY | Facility: CLINIC | Age: 77
Setting detail: THERAPIES SERIES
End: 2017-07-26
Attending: PHYSICIAN ASSISTANT
Payer: MEDICARE

## 2017-07-26 PROCEDURE — 97110 THERAPEUTIC EXERCISES: CPT | Mod: GP

## 2017-07-26 PROCEDURE — 97112 NEUROMUSCULAR REEDUCATION: CPT | Mod: GP

## 2017-07-26 PROCEDURE — 40000718 ZZHC STATISTIC PT DEPARTMENT ORTHO VISIT

## 2017-08-02 ENCOUNTER — HOSPITAL ENCOUNTER (OUTPATIENT)
Dept: PHYSICAL THERAPY | Facility: CLINIC | Age: 77
Setting detail: THERAPIES SERIES
End: 2017-08-02
Attending: PHYSICIAN ASSISTANT
Payer: MEDICARE

## 2017-08-02 PROCEDURE — 40000718 ZZHC STATISTIC PT DEPARTMENT ORTHO VISIT

## 2017-08-02 PROCEDURE — 97110 THERAPEUTIC EXERCISES: CPT | Mod: GP

## 2017-08-16 ENCOUNTER — HOSPITAL ENCOUNTER (OUTPATIENT)
Dept: PHYSICAL THERAPY | Facility: CLINIC | Age: 77
Setting detail: THERAPIES SERIES
End: 2017-08-16
Attending: PHYSICIAN ASSISTANT
Payer: MEDICARE

## 2017-08-16 PROCEDURE — G8980 MOBILITY D/C STATUS: HCPCS | Mod: GP,CI

## 2017-08-16 PROCEDURE — 97110 THERAPEUTIC EXERCISES: CPT | Mod: GP

## 2017-08-16 PROCEDURE — G8979 MOBILITY GOAL STATUS: HCPCS | Mod: GP,CI

## 2017-08-16 PROCEDURE — 97530 THERAPEUTIC ACTIVITIES: CPT | Mod: GP

## 2017-08-16 PROCEDURE — 40000718 ZZHC STATISTIC PT DEPARTMENT ORTHO VISIT

## 2017-08-16 PROCEDURE — G8978 MOBILITY CURRENT STATUS: HCPCS | Mod: GP,CI

## 2017-10-03 ENCOUNTER — HOSPITAL ENCOUNTER (OUTPATIENT)
Dept: CARDIOLOGY | Facility: CLINIC | Age: 77
Discharge: HOME | End: 2017-10-03
Attending: INTERNAL MEDICINE | Admitting: INTERNAL MEDICINE
Payer: MEDICARE

## 2017-10-03 DIAGNOSIS — Z95.2 S/P AVR: ICD-10-CM

## 2017-10-03 DIAGNOSIS — I25.10 CORONARY ARTERY DISEASE INVOLVING NATIVE CORONARY ARTERY OF NATIVE HEART WITHOUT ANGINA PECTORIS: ICD-10-CM

## 2017-10-03 DIAGNOSIS — Z95.2 S/P AVR (AORTIC VALVE REPLACEMENT): ICD-10-CM

## 2017-10-03 DIAGNOSIS — E78.5 HYPERLIPIDEMIA LDL GOAL <100: ICD-10-CM

## 2017-10-03 PROCEDURE — 25500064 ZZH RX 255 OP 636: Performed by: INTERNAL MEDICINE

## 2017-10-03 PROCEDURE — 93306 TTE W/DOPPLER COMPLETE: CPT | Mod: 26 | Performed by: INTERNAL MEDICINE

## 2017-10-03 PROCEDURE — 40000264 ECHO COMPLETE WITH OPTISON

## 2017-10-03 RX ADMIN — HUMAN ALBUMIN MICROSPHERES AND PERFLUTREN 3 ML: 10; .22 INJECTION, SOLUTION INTRAVENOUS at 09:45

## 2017-10-04 NOTE — PROGRESS NOTES
Outpatient Physical Therapy Discharge Note     Patient: Endy Navarro  : 1940    Beginning/End Dates of Reporting Period:  2017 to 10/4/2017     Referring Provider: Nikole Mckee PA-C    Therapy Diagnosis: impaired balance, Pain in L low back, buttock and posterior thigh.consistent with sciatic nerve pain.     Client Self Report: Pt states that he is walking a little over 1/2 mile before he gets too tired. He states that he doesn't feel that it is his hip/back limiting, but fatigue from decreased endurance.    Objective Measurements:  Objective Measure: Pain  Details: best: 0/10 worst: 2-3/10 current: 0/10 (on Nustep)    Objective Measure: CECELIA and Lyric   Details: Lyric = 0/0/Low (17: 0/2/Low; 17: 1/4/medium; Eval: 3/7/medium); CECELIA = 4% (17: 10%; 17: 28%, Eval: 50%)    Goals:  Goal Identifier Walking   Goal Description Pt able to walk for 1 mile with out increased back or leg pain (Decreased distance to .5 miles, feels endurance not pain )   Target Date 17   Date Met  17   Progress: Goal Met     Goal Identifier Stairs   Goal Description Pt able to climb stairs reciprocally  in order to access basement (Only reverts to marked when fatigued)   Target Date 17   Date Met  17   Progress: Goal Met     Goal Identifier HEP   Goal Description Indep with HEP for core stabilization and strengthening  (states he is comfortable, but does not complete consistently)   Target Date 17   Date Met  17   Progress:Goal Met     Goal Identifier Sleeping   Goal Description pt sleeps in a recliner and has for many years.  Pt report that he sleeps poorly most of the time   Target Date 17   Date Met      Progress:        Progress Toward Goals:   Progress this reporting period: Improvement in pain and functional strength as demonstrated through improvement in exercises.    Plan:  Discharge from therapy.    Discharge:    Reason for Discharge: Patient has met all goals  with the exception of sleeping goal.  Patient chooses to discontinue therapy as he feels he is comfortable with managing his symptoms and has had great improvement. Phone call made to patient on 9/5/2017, and message left for pt to return phone call. Pt did not return phone call and will be discharged at this time.    Equipment Issued: N/A    Discharge Plan: Patient to continue home program.

## 2017-10-04 NOTE — ADDENDUM NOTE
Encounter addended by: Elisha Ling PT on: 10/4/2017  9:46 AM<BR>     Actions taken: Sign clinical note, Flowsheet data copied forward, Flowsheet accepted, Charge Capture section accepted, Episode resolved

## 2017-10-11 ENCOUNTER — OFFICE VISIT (OUTPATIENT)
Dept: CARDIOLOGY | Facility: CLINIC | Age: 77
End: 2017-10-11
Payer: MEDICARE

## 2017-10-11 VITALS
HEART RATE: 74 BPM | HEIGHT: 69 IN | BODY MASS INDEX: 36.36 KG/M2 | OXYGEN SATURATION: 97 % | WEIGHT: 245.5 LBS | DIASTOLIC BLOOD PRESSURE: 88 MMHG | SYSTOLIC BLOOD PRESSURE: 130 MMHG

## 2017-10-11 DIAGNOSIS — E78.5 HYPERLIPIDEMIA LDL GOAL <100: ICD-10-CM

## 2017-10-11 DIAGNOSIS — I48.20 CHRONIC ATRIAL FIBRILLATION (H): ICD-10-CM

## 2017-10-11 DIAGNOSIS — I10 HYPERTENSION GOAL BP (BLOOD PRESSURE) < 140/90: ICD-10-CM

## 2017-10-11 DIAGNOSIS — Z95.2 S/P AVR (AORTIC VALVE REPLACEMENT): Primary | ICD-10-CM

## 2017-10-11 PROCEDURE — 99214 OFFICE O/P EST MOD 30 MIN: CPT | Performed by: INTERNAL MEDICINE

## 2017-10-11 NOTE — PROGRESS NOTES
2017         Richi Alvarez MD    [facility]    [address] [place] [address]      RE: Endy Navarro   MRN: [id number]   : 1940      Dear Richi:      I saw Ho Navarro today who is now is 77.  He is rather a sweet man who has lived most of his life in the [place] area.  He used to own a grocery store.  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      Ho has a history of coronary artery disease, having had previous RCA stenting.  He had an occluded OM that was treated medically.  He had a #23 porcine aortic valve placed 2 years ago and subsequently he has recovered beautifully.  He tends to be on the heavy side at 245 pounds with a moderate amount of central obesity.  Blood pressures tend to be normal today 130/80.      PAST MEDICAL HISTORY:  Includes:   1.  Chronic controlled atrial fibrillation.   2.  Overweight.   3.  Treated hyperlipidemia.   4.  Treated hypertension.      He does a fair amount of walking these days.  He is not limited by chest pain, palpitations, dizziness or syncope.  He is a little short of breath if he really pushes himself but he does not really need to do so.  He lives in a townhouse, he drives a car and he walks with some regularity.      DRUG INTOLERANCES:  Seroquel.      REVIEW OF SYSTEMS:     HEENT:  Negative.    CARDIORESPIRATORY:  Positive for shortness breath on exertion if he overexerts, otherwise negative.  No palpitations, dizziness or syncope with his atrial fibrillation.   UPPER GASTROINTESTINAL:  Negative.   LOWER GASTROINTESTINAL:  Negative.   GENITOURINARY:  Positive for frequency and some nocturia.   MUSCULOSKELETAL:  Some rather typically mild achiness of his joints.        The remaining review is relatively negative.      PHYSICAL EXAMINATION:   GENERAL:  Showed that he weighed 245 pounds.   VITAL SIGNS:  Blood pressure 130/80, heart rate 70 beats a minute and mildly irregular.   HEAD:  Normal.   NECK:   Free of neck vein distention or bruit at 90 degrees.   HEART:  Mildly irregular with a 1/6 systolic ejection murmur, no S3.   LUNGS:  Clear.   ABDOMEN:  Rotund, firm, nontender.   EXTREMITIES:  Free of edema.      A surveillance echo was done 10/03, a week ago.  It showed normal LV and RV systolic function, mild pulmonary hypertension with an RVSP of 37 mmHg plus right atrial pressure.  The LV was not dilated.  The mean gradient was 6.7 mm and the peak gradient 12 mmHg.  That is really quite excellent.      He does take antibiotics prior dental work or any contaminated operations.      MEDICATIONS:  His medication program is essentially unchanged in the past year includin.  Remeron 7.5 mg at bedtime.   2.  Proscar 5 mg a day.   3.  Prozac 60 mg a day.   4.  Allopurinol 100 mg b.i.d.   5.  Atorvastatin 40 mg at bedtime.   6.  Diltiazem  mg daily.   7.  Metoprolol tartrate 50 mg b.i.d.   8.  Eliquis 5 mg a day.   9.  Aspirin 81 mg a day.   10.  MiraLax daily.      Accordingly, he is essentially asymptomatic not counting dyspnea on overexertion.  He looks well and feels well.  He is taking good care of himself.  We discussed diet and some weight loss, although I really do not think he is very likely to lose weight at age 77 and I did not press him about it.  Otherwise, his lipid profiles have been excellent.  His blood pressure is normal.  He is asymptomatic from a cardiac standpoint as best I can tell.  He looks well, feels well.  He is taking great care of himself and he is a nice man.  I asked him to see us in 2 years.  If he has problems, let me know.  I referred all of his care to you.      If you have any questions, give me a call.      Sincerely,         ANSHU ELLIS MD, [facility]             D: 10/11/2017 10:01   T: 10/11/2017 11:22   MT: JETT      Name:     CARINA CARVAJAL   MRN:      [id number]        Account:      [id number]   :      1940           Service Date: 10/11/2017       Document: [id number]

## 2017-10-11 NOTE — LETTER
10/11/2017      RE: Endy Navarro  [address] [address]       Dear Colleague,    Thank you for the opportunity to participate in the care of your patient, Endy Navarro, at the [facility]. Please see a copy of my visit note below.    HPI and Plan:   See dictation      I recommend continuing current treatment plans in the chart.    No orders of the defined types were placed in this encounter.    No orders of the defined types were placed in this encounter.    There are no discontinued medications.      No diagnosis found.    CURRENT MEDICATIONS:  Current Outpatient Prescriptions   Medication Sig Dispense Refill     finasteride (PROSCAR) 5 MG tablet Take 1 tablet (5 mg) by mouth daily 90 tablet 3     mirtazapine (REMERON) 7.5 MG TABS tablet Take 1 tablet (7.5 mg) by mouth At Bedtime 90 tablet 3     FLUoxetine (PROZAC) 20 MG capsule Take 3 capsules (60 mg) by mouth daily 270 capsule 3     allopurinol (ZYLOPRIM) 100 MG tablet Take 1 tablet (100 mg) by mouth 2 times daily 180 tablet 3     atorvastatin (LIPITOR) 40 MG tablet Take 1 tablet (40 mg) by mouth daily 90 tablet 3     diltiazem (DILACOR XR) 120 MG 24 hr ER capsule Take 1 capsule (120 mg) by mouth daily 90 capsule 3     metoprolol (LOPRESSOR) 50 MG tablet Take 1 tablet (50 mg) by mouth 2 times daily 180 tablet 3     apixaban ANTICOAGULANT (ELIQUIS) 5 MG tablet Take 1 tablet (5 mg) by mouth 2 times daily 180 tablet 3     aspirin EC 81 MG EC tablet Take 1 tablet (81 mg) by mouth daily       polyethylene glycol (MIRALAX/GLYCOLAX) packet Take 17 g by mouth daily 7 packet      nitroglycerin (NITROSTAT) 0.4 MG SL tablet Place 1 tablet (0.4 mg) under the tongue every 5 minutes as needed for chest pain (Patient not taking: Reported on 10/11/2017) 25 tablet 4     acetaminophen 650 MG TABS Take 650 mg by mouth every 6 hours (Patient not taking: Reported on 6/6/2017) 100 tablet         ALLERGIES     Allergies   Allergen Reactions     Seroquel [Quetiapine] Other (See Comments)     Felt funny       PAST MEDICAL HISTORY:  Past Medical History:   Diagnosis Date     Anxiety state, unspecified      Atrial fibrillation (H)      Bell's palsy 12/12/1997     Bioprosthetic aortic valve replacement during current hospitalization      Coronary atherosclerosis of unspecified type of vessel, native or graft     coronary artery disease with history of MI and stent placement      Gout, unspecified      Hydrocephalus 2015     Old myocardial infarction 3/1998    Hx of MI     Osteoarthrosis, unspecified whether generalized or localized, unspecified site     Diffuse migratory arthritis     Other and unspecified hyperlipidemia      Paroxysmal supraventricular tachycardia (H)     Hx of PAT     Pure hypercholesterolemia      Stented coronary artery        PAST SURGICAL HISTORY:  Past Surgical History:   Procedure Laterality Date     C EXPLORATORY OF ABDOMEN  1/25/2007    Exploratory laparotomy.  Lysis of adhesions with reduction of internal hernia.     C ROTATOR CUFF STRAP      s/p rotator cuff surgery     COLONOSCOPY N/A 12/5/2016    Procedure: COMBINED COLONOSCOPY, SINGLE OR MULTIPLE BIOPSY/POLYPECTOMY BY BIOPSY;  Surgeon: Benjamin Cavazos MD;  Location: [facility]     HC COLONOSCOPY THRU STOMA, DIAGNOSTIC  8/23/2004     HC KNEE SCOPE,MED/LAT MENISCUS REPAIR       HC REMOVE TONSILS/ADENOIDS,<13 Y/O      unsure of age     Hydrocephalus  2015    shunt placed     INJECT EPIDURAL LUMBAR N/A 6/8/2017    Procedure: INJECT EPIDURAL LUMBAR;  lumbar epidural steroid injection Left Lumbar 5 to sacral 1;  Surgeon: Pawan Davenport MD;  Location: [facility]     REMOVAL OF SPERM DUCT(S)      Vasectomy     REPLACE VALVE AORTIC N/A 9/14/2015    Procedure: REPLACE VALVE AORTIC;  Surgeon: Sang Chan MD;  Location:  OR       FAMILY HISTORY:  Family History   Problem Relation Age of Onset     CEREBROVASCULAR DISEASE Mother       CEREBROVASCULAR DISEASE Father      Hypertension Father      C.A.D. Sister      C.A.TAMIKA. Brother      DIABETES Maternal Aunt      Prostate Cancer Brother      Cancer - colorectal No family hx of        SOCIAL HISTORY:  Social History     Social History     Marital status:      Spouse name: N/A     Number of children: N/A     Years of education: N/A     Occupational History     Laundry Mat      Social History Main Topics     Smoking status: Former Smoker     Smokeless tobacco: Never Used      Comment: quit 25 yr ago     Alcohol use 1.8 - 2.4 oz/week     3 - 4 Standard drinks or equivalent per week      Comment: Occ     Drug use: No     Sexual activity: Yes     Partners: Female     Other Topics Concern      Service Yes     Blood Transfusions No     Caffeine Concern No     coffee- 3-4 cups daily     Occupational Exposure No     Hobby Hazards No     Sleep Concern Yes     trouble sleeping     Stress Concern No     Weight Concern No     Special Diet No     Back Care Yes     Low back pain with riding in car     Exercise No     Seat Belt Yes     Self-Exams Yes     Social History Narrative         Review of Systems:  Skin:  Negative       Eyes:  Positive for glasses    ENT:  Positive for hearing loss    Respiratory:  Positive for shortness of breath;dyspnea on exertion     Cardiovascular:  Negative for;palpitations;edema;lightheadedness;dizziness Positive for;chest pain    Gastroenterology: Positive for   after eating feels full right away no matter how little he eats   Genitourinary:  Negative      Musculoskeletal:  Positive for   fall about 3 weeks ago, left side chest pain not sure if due to the fall, off balance most of the time   Neurologic:  Negative      Psychiatric:  Positive for sleep disturbances;depression    Heme/Lymph/Imm:  Negative      Endocrine:  Negative        Physical Exam:  Vitals: /88 (BP Location: Right arm, Patient Position: Fowlers, Cuff Size: Adult Large)  Pulse 74  Ht 1.753  "m (5' 9\")  Wt 111.4 kg (245 lb 8 oz)  SpO2 97%  BMI 36.25 kg/m2    Constitutional:  cooperative;alert and oriented;well developed;well nourished overweight      Skin:  warm and dry to the touch        Head:  normocephalic        Eyes:  pupils equal and round;sclera white        ENT:  no pallor or cyanosis        Neck:  carotid pulses are full and equal bilaterally;JVP normal;no carotid bruit        Chest:  clear to auscultation;healed median sternotomy scar          Cardiac: normal S1 and S2;no S3 or S4;no murmurs, gallops or rubs detected irregularly irregular rhythm     late systolic murmur grade 3        Abdomen:  abdomen soft;no masses;non-tender obese      Vascular:   pulses below the femoral arteries are diminished                                      Extremities and Back:  no deformities, clubbing, cyanosis, erythema observed;no edema              Neurological:  affect appropriate, oriented to time, person and place;no gross motor deficits   some memory loss      Recent Lab Results:  LIPID RESULTS:  Lab Results   Component Value Date    CHOL 162 11/11/2016    HDL 44 11/11/2016    LDL 71 11/11/2016    TRIG 236 (H) 11/11/2016    CHOLHDLRATIO 4.0 10/17/2013       LIVER ENZYME RESULTS:  Lab Results   Component Value Date    AST 21 11/11/2016    ALT 29 11/11/2016       CBC RESULTS:  Lab Results   Component Value Date    WBC 7.6 11/11/2016    RBC 4.78 11/11/2016    HGB 14.9 11/11/2016    HCT 46.4 11/11/2016    MCV 97 11/11/2016    MCH 31.2 11/11/2016    MCHC 32.1 11/11/2016    RDW 13.7 11/11/2016     11/11/2016       BMP RESULTS:  Lab Results   Component Value Date     11/11/2016    POTASSIUM 4.6 11/11/2016    CHLORIDE 108 11/11/2016    CO2 27 11/11/2016    ANIONGAP 7 11/11/2016     (H) 11/11/2016    BUN 27 11/11/2016    CR 1.34 (H) 05/03/2017    GFRESTIMATED 52 (L) 05/03/2017    GFRESTBLACK 62 05/03/2017    HARLEEN 8.8 11/11/2016        A1C RESULTS:  Lab Results   Component Value Date    A1C 5.5 " 09/15/2015       INR RESULTS:  Lab Results   Component Value Date    INR 1.65 (H) 2015    INR 0.98 2015           CC  No referring provider defined for this encounter.                    2017         Richi Alvarez MD    [facility]    [address] [place] [address]      RE: Endy Navarro   MRN: [id number]   : 1940      Dear Richi:      I saw Ho Navraro today who is now is 77.  He is rather a sweet man who has lived most of his life in the [facility].  He used to own a grocery store.  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      Ho has a history of coronary artery disease, having had previous RCA stenting.  He had an occluded OM that was treated medically.  He had a #23 porcine aortic valve placed 2 years ago and subsequently he has recovered beautifully.  He tends to be on the heavy side at 245 pounds with a moderate amount of central obesity.  Blood pressures tend to be normal today 130/80.      PAST MEDICAL HISTORY:  Includes:   1.  Chronic controlled atrial fibrillation.   2.  Overweight.   3.  Treated hyperlipidemia.   4.  Treated hypertension.      He does a fair amount of walking these days.  He is not limited by chest pain, palpitations, dizziness or syncope.  He is a little short of breath if he really pushes himself but he does not really need to do so.  He lives in a townhouse, he drives a car and he walks with some regularity.      DRUG INTOLERANCES:  Seroquel.      REVIEW OF SYSTEMS:     HEENT:  Negative.    CARDIORESPIRATORY:  Positive for shortness breath on exertion if he overexerts, otherwise negative.  No palpitations, dizziness or syncope with his atrial fibrillation.   UPPER GASTROINTESTINAL:  Negative.   LOWER GASTROINTESTINAL:  Negative.   GENITOURINARY:  Positive for frequency and some nocturia.   MUSCULOSKELETAL:  Some rather typically mild achiness of his joints.        The remaining review is relatively  negative.      PHYSICAL EXAMINATION:   GENERAL:  Showed that he weighed 245 pounds.   VITAL SIGNS:  Blood pressure 130/80, heart rate 70 beats a minute and mildly irregular.   HEAD:  Normal.   NECK:  Free of neck vein distention or bruit at 90 degrees.   HEART:  Mildly irregular with a 1/6 systolic ejection murmur, no S3.   LUNGS:  Clear.   ABDOMEN:  Rotund, firm, nontender.   EXTREMITIES:  Free of edema.      A surveillance echo was done 10/03, a week ago.  It showed normal LV and RV systolic function, mild pulmonary hypertension with an RVSP of 37 mmHg plus right atrial pressure.  The LV was not dilated.  The mean gradient was 6.7 mm and the peak gradient 12 mmHg.  That is really quite excellent.      He does take antibiotics prior dental work or any contaminated operations.      MEDICATIONS:  His medication program is essentially unchanged in the past year includin.  Remeron 7.5 mg at bedtime.   2.  Proscar 5 mg a day.   3.  Prozac 60 mg a day.   4.  Allopurinol 100 mg b.i.d.   5.  Atorvastatin 40 mg at bedtime.   6.  Diltiazem  mg daily.   7.  Metoprolol tartrate 50 mg b.i.d.   8.  Eliquis 5 mg a day.   9.  Aspirin 81 mg a day.   10.  MiraLax daily.      Accordingly, he is essentially asymptomatic not counting dyspnea on overexertion.  He looks well and feels well.  He is taking good care of himself.  We discussed diet and some weight loss, although I really do not think he is very likely to lose weight at age 77 and I did not press him about it.  Otherwise, his lipid profiles have been excellent.  His blood pressure is normal.  He is asymptomatic from a cardiac standpoint as best I can tell.  He looks well, feels well.  He is taking great care of himself and he is a nice man.  I asked him to see us in 2 years.  If he has problems, let me know.  I referred all of his care to you.      If you have any questions, give me a call.      Sincerely,         ANSHU ELLIS MD, [facility]             D:  10/11/2017 10:01   T: 10/11/2017 11:22   MT: JETT      Name:     CARINA CARVAJAL   MRN:      [id number]        Account:      [id number]   :      1940           Service Date: 10/11/2017      Document: [id number]

## 2017-10-11 NOTE — MR AVS SNAPSHOT
After Visit Summary   10/11/2017    Endy Navarro    MRN: [id number]           Patient Information     Date Of Birth          1940        Visit Information        Provider Department      10/11/2017 9:30 AM John Morrell MD [facility]        Today's Diagnoses     S/P AVR (aortic valve replacement)    -  1    Hypertension goal BP (blood pressure) < 140/90        Hyperlipidemia LDL goal <100        Chronic atrial fibrillation (H)           Follow-ups after your visit        Additional Services     Follow-Up with Cardiologist                 Your next 10 appointments already scheduled     Nov 14, 2017  9:00 AM CST   PHYSICAL with Richi Alvarez MD   [facility] ([facility]    [address] [address] [phone]              Future tests that were ordered for you today     Open Future Orders        Priority Expected Expires Ordered    Follow-Up with Cardiologist Routine 10/11/2019 10/31/2019 10/11/2017            Who to contact     If you have questions or need follow up information about today's clinic visit or your schedule please contact [facility] directly at [phone].  Normal or non-critical lab and imaging results will be communicated to you by Fivetranhart, letter or phone within 4 business days after the clinic has received the results. If you do not hear from us within 7 days, please contact the clinic through Fivetranhart or phone. If you have a critical or abnormal lab result, we will notify you by phone as soon as possible.  Submit refill requests through [facility] or call your pharmacy and they will forward the refill request to us. Please allow 3 business days for your refill to be completed.          Additional Information About Your Visit        Fivetranhart Information     [facility] gives you secure access to your electronic health record. If you see a primary care provider, you can also send messages to  "your care team and make appointments. If you have questions, please call your primary care clinic.  If you do not have a primary care provider, please call [phone] and they will assist you.        Care EveryWhere ID     This is your Care EveryWhere ID. This could be used by other organizations to access your [facility] medical records  [id number]        Your Vitals Were     Pulse Height Pulse Oximetry BMI (Body Mass Index)          74 1.753 m (5' 9\") 97% 36.25 kg/m2         Blood Pressure from Last 3 Encounters:   10/11/17 130/88   06/08/17 111/85   06/06/17 128/80    Weight from Last 3 Encounters:   10/11/17 111.4 kg (245 lb 8 oz)   06/06/17 108.2 kg (238 lb 8 oz)   05/18/17 111.6 kg (246 lb)               Primary Care Provider Office Phone # Fax #    Richi Alvarez [phone]       [facility] [address] [place] [address]        Equal Access to Services     [facility] HospitalLUISANA : Hadii aad ku hadasho Soomaali, waaxda luqadaha, qaybta kaalmada adeegyada, waxay idiin hayjames anderson . So [facility] [phone].    ATENCIÓN: Si habla español, tiene a leon disposición servicios gratuitos de asistencia lingüística. Llame al [phone].    We comply with applicable federal civil rights laws and [place] laws. We do not discriminate on the basis of race, color, national origin, age, disability, sex, sexual orientation, or gender identity.            Thank you!     Thank you for choosing [facility]  for your care. Our goal is always to provide you with excellent care. Hearing back from our patients is one way we can continue to improve our services. Please take a few minutes to complete the written survey that you may receive in the mail after your visit with us. Thank you!             Your Updated Medication List - Protect others around you: Learn how to safely use, store and throw away your medicines at www.disposemymeds.org.          This list is accurate as " of: 10/11/17 10:11 AM.  Always use your most recent med list.                   Brand Name Dispense Instructions for use Diagnosis    acetaminophen 650 MG 8 hour tablet     100 tablet    Take 650 mg by mouth every 6 hours    S/P AVR       allopurinol 100 MG tablet    ZYLOPRIM    180 tablet    Take 1 tablet (100 mg) by mouth 2 times daily    Acute gouty arthritis       apixaban ANTICOAGULANT 5 MG tablet    ELIQUIS    180 tablet    Take 1 tablet (5 mg) by mouth 2 times daily    S/P AVR       aspirin 81 MG EC tablet      Take 1 tablet (81 mg) by mouth daily    S/P AVR       atorvastatin 40 MG tablet    LIPITOR    90 tablet    Take 1 tablet (40 mg) by mouth daily    Hyperlipidemia LDL goal <100       diltiazem 120 MG 24 hr capsule    DILACOR XR    90 capsule    Take 1 capsule (120 mg) by mouth daily    S/P AVR       finasteride 5 MG tablet    PROSCAR    90 tablet    Take 1 tablet (5 mg) by mouth daily    Benign nodular prostatic hyperplasia with lower urinary tract symptoms       FLUoxetine 20 MG capsule    PROzac    270 capsule    Take 3 capsules (60 mg) by mouth daily    S/P AVR       metoprolol 50 MG tablet    LOPRESSOR    180 tablet    Take 1 tablet (50 mg) by mouth 2 times daily    S/P AVR       mirtazapine 7.5 MG Tabs tablet    REMERON    90 tablet    Take 1 tablet (7.5 mg) by mouth At Bedtime    S/P AVR       nitroGLYcerin 0.4 MG sublingual tablet    NITROSTAT    25 tablet    Place 1 tablet (0.4 mg) under the tongue every 5 minutes as needed for chest pain    Coronary artery disease involving native coronary artery of native heart without angina pectoris       polyethylene glycol Packet    MIRALAX/GLYCOLAX    7 packet    Take 17 g by mouth daily    S/P AVR

## 2017-10-11 NOTE — LETTER
2017         Richi Alvarez MD    [facility]    [address] [place] [address]      RE: Endy Navarro   MRN: [id number]   : 1940      Dear Richi:      I saw Ho Navarro today who is now is 77.  He is rather a sweet man who has lived most of his life in the [facility] area.  He used to own a grocery store.  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      Ho has a history of coronary artery disease, having had previous RCA stenting.  He had an occluded OM that was treated medically.  He had a #23 porcine aortic valve placed 2 years ago and subsequently he has recovered beautifully.  He tends to be on the heavy side at 245 pounds with a moderate amount of central obesity.  Blood pressures tend to be normal today 130/80.      PAST MEDICAL HISTORY:  Includes:   1.  Chronic controlled atrial fibrillation.   2.  Overweight.   3.  Treated hyperlipidemia.   4.  Treated hypertension.      He does a fair amount of walking these days.  He is not limited by chest pain, palpitations, dizziness or syncope.  He is a little short of breath if he really pushes himself but he does not really need to do so.  He lives in a townhouse, he drives a car and he walks with some regularity.      DRUG INTOLERANCES:  Seroquel.      REVIEW OF SYSTEMS:     HEENT:  Negative.    CARDIORESPIRATORY:  Positive for shortness breath on exertion if he overexerts, otherwise negative.  No palpitations, dizziness or syncope with his atrial fibrillation.   UPPER GASTROINTESTINAL:  Negative.   LOWER GASTROINTESTINAL:  Negative.   GENITOURINARY:  Positive for frequency and some nocturia.   MUSCULOSKELETAL:  Some rather typically mild achiness of his joints.        The remaining review is relatively negative.      PHYSICAL EXAMINATION:   GENERAL:  Showed that he weighed 245 pounds.   VITAL SIGNS:  Blood pressure 130/80, heart rate 70 beats a minute and mildly irregular.   HEAD:  Normal.   NECK:   Free of neck vein distention or bruit at 90 degrees.   HEART:  Mildly irregular with a 1/6 systolic ejection murmur, no S3.   LUNGS:  Clear.   ABDOMEN:  Rotund, firm, nontender.   EXTREMITIES:  Free of edema.      A surveillance echo was done 10/03, a week ago.  It showed normal LV and RV systolic function, mild pulmonary hypertension with an RVSP of 37 mmHg plus right atrial pressure.  The LV was not dilated.  The mean gradient was 6.7 mm and the peak gradient 12 mmHg.  That is really quite excellent.      He does take antibiotics prior dental work or any contaminated operations.      MEDICATIONS:  His medication program is essentially unchanged in the past year includin.  Remeron 7.5 mg at bedtime.   2.  Proscar 5 mg a day.   3.  Prozac 60 mg a day.   4.  Allopurinol 100 mg b.i.d.   5.  Atorvastatin 40 mg at bedtime.   6.  Diltiazem  mg daily.   7.  Metoprolol tartrate 50 mg b.i.d.   8.  Eliquis 5 mg a day.   9.  Aspirin 81 mg a day.   10.  MiraLax daily.      Accordingly, he is essentially asymptomatic not counting dyspnea on overexertion.  He looks well and feels well.  He is taking good care of himself.  We discussed diet and some weight loss, although I really do not think he is very likely to lose weight at age 77 and I did not press him about it.  Otherwise, his lipid profiles have been excellent.  His blood pressure is normal.  He is asymptomatic from a cardiac standpoint as best I can tell.  He looks well, feels well.  He is taking great care of himself and he is a nice man.  I asked him to see us in 2 years.  If he has problems, let me know.  I referred all of his care to you.      If you have any questions, give me a call.      Sincerely,         ANSHU ELLIS MD, [facility]

## 2017-10-11 NOTE — PROGRESS NOTES
HPI and Plan:   See dictation      I recommend continuing current treatment plans in the chart.    No orders of the defined types were placed in this encounter.    No orders of the defined types were placed in this encounter.    There are no discontinued medications.      No diagnosis found.    CURRENT MEDICATIONS:  Current Outpatient Prescriptions   Medication Sig Dispense Refill     finasteride (PROSCAR) 5 MG tablet Take 1 tablet (5 mg) by mouth daily 90 tablet 3     mirtazapine (REMERON) 7.5 MG TABS tablet Take 1 tablet (7.5 mg) by mouth At Bedtime 90 tablet 3     FLUoxetine (PROZAC) 20 MG capsule Take 3 capsules (60 mg) by mouth daily 270 capsule 3     allopurinol (ZYLOPRIM) 100 MG tablet Take 1 tablet (100 mg) by mouth 2 times daily 180 tablet 3     atorvastatin (LIPITOR) 40 MG tablet Take 1 tablet (40 mg) by mouth daily 90 tablet 3     diltiazem (DILACOR XR) 120 MG 24 hr ER capsule Take 1 capsule (120 mg) by mouth daily 90 capsule 3     metoprolol (LOPRESSOR) 50 MG tablet Take 1 tablet (50 mg) by mouth 2 times daily 180 tablet 3     apixaban ANTICOAGULANT (ELIQUIS) 5 MG tablet Take 1 tablet (5 mg) by mouth 2 times daily 180 tablet 3     aspirin EC 81 MG EC tablet Take 1 tablet (81 mg) by mouth daily       polyethylene glycol (MIRALAX/GLYCOLAX) packet Take 17 g by mouth daily 7 packet      nitroglycerin (NITROSTAT) 0.4 MG SL tablet Place 1 tablet (0.4 mg) under the tongue every 5 minutes as needed for chest pain (Patient not taking: Reported on 10/11/2017) 25 tablet 4     acetaminophen 650 MG TABS Take 650 mg by mouth every 6 hours (Patient not taking: Reported on 6/6/2017) 100 tablet        ALLERGIES     Allergies   Allergen Reactions     Seroquel [Quetiapine] Other (See Comments)     Felt funny       PAST MEDICAL HISTORY:  Past Medical History:   Diagnosis Date     Anxiety state, unspecified      Atrial fibrillation (H)      Bell's palsy 12/12/1997     Bioprosthetic aortic valve replacement during current  hospitalization      Coronary atherosclerosis of unspecified type of vessel, native or graft     coronary artery disease with history of MI and stent placement      Gout, unspecified      Hydrocephalus 2015     Old myocardial infarction 3/1998    Hx of MI     Osteoarthrosis, unspecified whether generalized or localized, unspecified site     Diffuse migratory arthritis     Other and unspecified hyperlipidemia      Paroxysmal supraventricular tachycardia (H)     Hx of PAT     Pure hypercholesterolemia      Stented coronary artery        PAST SURGICAL HISTORY:  Past Surgical History:   Procedure Laterality Date     C EXPLORATORY OF ABDOMEN  1/25/2007    Exploratory laparotomy.  Lysis of adhesions with reduction of internal hernia.     C ROTATOR CUFF STRAP      s/p rotator cuff surgery     COLONOSCOPY N/A 12/5/2016    Procedure: COMBINED COLONOSCOPY, SINGLE OR MULTIPLE BIOPSY/POLYPECTOMY BY BIOPSY;  Surgeon: Benjamin Cavazos MD;  Location: [facility]     HC COLONOSCOPY THRU STOMA, DIAGNOSTIC  8/23/2004     HC KNEE SCOPE,MED/LAT MENISCUS REPAIR       HC REMOVE TONSILS/ADENOIDS,<13 Y/O      unsure of age     Hydrocephalus  2015    shunt placed     INJECT EPIDURAL LUMBAR N/A 6/8/2017    Procedure: INJECT EPIDURAL LUMBAR;  lumbar epidural steroid injection Left Lumbar 5 to sacral 1;  Surgeon: Pawan Davenport MD;  Location: [facility]     REMOVAL OF SPERM DUCT(S)      Vasectomy     REPLACE VALVE AORTIC N/A 9/14/2015    Procedure: REPLACE VALVE AORTIC;  Surgeon: aSng Chan MD;  Location:  OR       FAMILY HISTORY:  Family History   Problem Relation Age of Onset     CEREBROVASCULAR DISEASE Mother      CEREBROVASCULAR DISEASE Father      Hypertension Father      C.A.D. Sister      C.A.D. Brother      DIABETES Maternal Aunt      Prostate Cancer Brother      Cancer - colorectal No family hx of        SOCIAL HISTORY:  Social History     Social History     Marital status:      Spouse name: N/A     Number of children:  "N/A     Years of education: N/A     Occupational History     Laundry Mat      Social History Main Topics     Smoking status: Former Smoker     Smokeless tobacco: Never Used      Comment: quit 25 yr ago     Alcohol use 1.8 - 2.4 oz/week     3 - 4 Standard drinks or equivalent per week      Comment: Occ     Drug use: No     Sexual activity: Yes     Partners: Female     Other Topics Concern      Service Yes     Blood Transfusions No     Caffeine Concern No     coffee- 3-4 cups daily     Occupational Exposure No     Hobby Hazards No     Sleep Concern Yes     trouble sleeping     Stress Concern No     Weight Concern No     Special Diet No     Back Care Yes     Low back pain with riding in car     Exercise No     Seat Belt Yes     Self-Exams Yes     Social History Narrative         Review of Systems:  Skin:  Negative       Eyes:  Positive for glasses    ENT:  Positive for hearing loss    Respiratory:  Positive for shortness of breath;dyspnea on exertion     Cardiovascular:  Negative for;palpitations;edema;lightheadedness;dizziness Positive for;chest pain    Gastroenterology: Positive for   after eating feels full right away no matter how little he eats   Genitourinary:  Negative      Musculoskeletal:  Positive for   fall about 3 weeks ago, left side chest pain not sure if due to the fall, off balance most of the time   Neurologic:  Negative      Psychiatric:  Positive for sleep disturbances;depression    Heme/Lymph/Imm:  Negative      Endocrine:  Negative        Physical Exam:  Vitals: /88 (BP Location: Right arm, Patient Position: Fowlers, Cuff Size: Adult Large)  Pulse 74  Ht 1.753 m (5' 9\")  Wt 111.4 kg (245 lb 8 oz)  SpO2 97%  BMI 36.25 kg/m2    Constitutional:  cooperative;alert and oriented;well developed;well nourished overweight      Skin:  warm and dry to the touch        Head:  normocephalic        Eyes:  pupils equal and round;sclera white        ENT:  no pallor or cyanosis        Neck:  " carotid pulses are full and equal bilaterally;JVP normal;no carotid bruit        Chest:  clear to auscultation;healed median sternotomy scar          Cardiac: normal S1 and S2;no S3 or S4;no murmurs, gallops or rubs detected irregularly irregular rhythm     late systolic murmur grade 3        Abdomen:  abdomen soft;no masses;non-tender obese      Vascular:   pulses below the femoral arteries are diminished                                      Extremities and Back:  no deformities, clubbing, cyanosis, erythema observed;no edema              Neurological:  affect appropriate, oriented to time, person and place;no gross motor deficits   some memory loss      Recent Lab Results:  LIPID RESULTS:  Lab Results   Component Value Date    CHOL 162 11/11/2016    HDL 44 11/11/2016    LDL 71 11/11/2016    TRIG 236 (H) 11/11/2016    CHOLHDLRATIO 4.0 10/17/2013       LIVER ENZYME RESULTS:  Lab Results   Component Value Date    AST 21 11/11/2016    ALT 29 11/11/2016       CBC RESULTS:  Lab Results   Component Value Date    WBC 7.6 11/11/2016    RBC 4.78 11/11/2016    HGB 14.9 11/11/2016    HCT 46.4 11/11/2016    MCV 97 11/11/2016    MCH 31.2 11/11/2016    MCHC 32.1 11/11/2016    RDW 13.7 11/11/2016     11/11/2016       BMP RESULTS:  Lab Results   Component Value Date     11/11/2016    POTASSIUM 4.6 11/11/2016    CHLORIDE 108 11/11/2016    CO2 27 11/11/2016    ANIONGAP 7 11/11/2016     (H) 11/11/2016    BUN 27 11/11/2016    CR 1.34 (H) 05/03/2017    GFRESTIMATED 52 (L) 05/03/2017    GFRESTBLACK 62 05/03/2017    HARLEEN 8.8 11/11/2016        A1C RESULTS:  Lab Results   Component Value Date    A1C 5.5 09/15/2015       INR RESULTS:  Lab Results   Component Value Date    INR 1.65 (H) 09/14/2015    INR 0.98 08/14/2015           CC  No referring provider defined for this encounter.

## 2017-10-23 DIAGNOSIS — Z95.2 S/P AVR: ICD-10-CM

## 2017-10-23 NOTE — TELEPHONE ENCOUNTER
eliquis      Last Written Prescription Date:  07/26/2017  Last Fill Quantity: 360,   # refills: 0  Future Office visit:    Next 5 appointments (look out 90 days)     Nov 14, 2017  9:00 AM CST   PHYSICAL with Richi Alvarez MD   [facility]    [address] [place] [id number]   [phone]                   Routing refill request to provider for review/approval because:  Drug not on the [facility], [facility] or [facility] refill protocol or controlled substance

## 2017-10-25 NOTE — TELEPHONE ENCOUNTER
Routing refill request to provider for review/approval because:  Labs out of range:        Selene Hernandez RN

## 2017-10-31 DIAGNOSIS — Z95.2 S/P AVR: ICD-10-CM

## 2017-10-31 DIAGNOSIS — E78.5 HYPERLIPIDEMIA LDL GOAL <100: ICD-10-CM

## 2017-10-31 RX ORDER — DILTIAZEM HYDROCHLORIDE 120 MG/1
120 CAPSULE, EXTENDED RELEASE ORAL DAILY
Qty: 90 CAPSULE | Refills: 3 | Status: SHIPPED | OUTPATIENT
Start: 2017-10-31 | End: 2018-11-16

## 2017-10-31 RX ORDER — ATORVASTATIN CALCIUM 40 MG/1
40 TABLET, FILM COATED ORAL DAILY
Qty: 90 TABLET | Refills: 3 | Status: SHIPPED | OUTPATIENT
Start: 2017-10-31 | End: 2018-10-22

## 2017-11-14 ENCOUNTER — OFFICE VISIT (OUTPATIENT)
Dept: INTERNAL MEDICINE | Facility: CLINIC | Age: 77
End: 2017-11-14
Payer: MEDICARE

## 2017-11-14 VITALS
DIASTOLIC BLOOD PRESSURE: 84 MMHG | TEMPERATURE: 97.5 F | BODY MASS INDEX: 36.03 KG/M2 | WEIGHT: 244 LBS | SYSTOLIC BLOOD PRESSURE: 142 MMHG | HEART RATE: 72 BPM | RESPIRATION RATE: 20 BRPM | OXYGEN SATURATION: 98 %

## 2017-11-14 DIAGNOSIS — N40.1 BENIGN PROSTATIC HYPERPLASIA WITH URINARY HESITANCY: ICD-10-CM

## 2017-11-14 DIAGNOSIS — Z95.2 S/P AVR (AORTIC VALVE REPLACEMENT): ICD-10-CM

## 2017-11-14 DIAGNOSIS — M10.9 ACUTE GOUTY ARTHRITIS: ICD-10-CM

## 2017-11-14 DIAGNOSIS — I10 ESSENTIAL HYPERTENSION WITH GOAL BLOOD PRESSURE LESS THAN 140/90: ICD-10-CM

## 2017-11-14 DIAGNOSIS — Z00.00 ENCOUNTER FOR ROUTINE ADULT HEALTH EXAMINATION WITHOUT ABNORMAL FINDINGS: Primary | ICD-10-CM

## 2017-11-14 DIAGNOSIS — R39.11 BENIGN PROSTATIC HYPERPLASIA WITH URINARY HESITANCY: ICD-10-CM

## 2017-11-14 DIAGNOSIS — E78.5 HYPERLIPIDEMIA LDL GOAL <100: ICD-10-CM

## 2017-11-14 DIAGNOSIS — Z98.2 VP (VENTRICULOPERITONEAL) SHUNT STATUS: ICD-10-CM

## 2017-11-14 DIAGNOSIS — Z23 NEED FOR PROPHYLACTIC VACCINATION AND INOCULATION AGAINST INFLUENZA: ICD-10-CM

## 2017-11-14 DIAGNOSIS — I48.20 CHRONIC ATRIAL FIBRILLATION (H): ICD-10-CM

## 2017-11-14 DIAGNOSIS — Z95.2 S/P AVR: ICD-10-CM

## 2017-11-14 DIAGNOSIS — I10 HYPERTENSION GOAL BP (BLOOD PRESSURE) < 140/90: ICD-10-CM

## 2017-11-14 LAB
ALBUMIN SERPL-MCNC: 3.4 G/DL (ref 3.4–5)
ALP SERPL-CCNC: 106 U/L (ref 40–150)
ALT SERPL W P-5'-P-CCNC: 32 U/L (ref 0–70)
ANION GAP SERPL CALCULATED.3IONS-SCNC: 7 MMOL/L (ref 3–14)
AST SERPL W P-5'-P-CCNC: 21 U/L (ref 0–45)
BILIRUB SERPL-MCNC: 0.3 MG/DL (ref 0.2–1.3)
BUN SERPL-MCNC: 21 MG/DL (ref 7–30)
CALCIUM SERPL-MCNC: 8.4 MG/DL (ref 8.5–10.1)
CHLORIDE SERPL-SCNC: 105 MMOL/L (ref 94–109)
CHOLEST SERPL-MCNC: 135 MG/DL
CO2 SERPL-SCNC: 29 MMOL/L (ref 20–32)
CREAT SERPL-MCNC: 1.3 MG/DL (ref 0.66–1.25)
CREAT UR-MCNC: 91 MG/DL
ERYTHROCYTE [DISTWIDTH] IN BLOOD BY AUTOMATED COUNT: 14.9 % (ref 10–15)
GFR SERPL CREATININE-BSD FRML MDRD: 53 ML/MIN/1.7M2
GLUCOSE SERPL-MCNC: 95 MG/DL (ref 70–99)
HCT VFR BLD AUTO: 46.5 % (ref 40–53)
HDLC SERPL-MCNC: 51 MG/DL
HGB BLD-MCNC: 14.7 G/DL (ref 13.3–17.7)
LDLC SERPL CALC-MCNC: 53 MG/DL
MCH RBC QN AUTO: 31.4 PG (ref 26.5–33)
MCHC RBC AUTO-ENTMCNC: 31.6 G/DL (ref 31.5–36.5)
MCV RBC AUTO: 99 FL (ref 78–100)
MICROALBUMIN UR-MCNC: 1230 MG/L
MICROALBUMIN/CREAT UR: 1354.63 MG/G CR (ref 0–17)
NONHDLC SERPL-MCNC: 84 MG/DL
PLATELET # BLD AUTO: 155 10E9/L (ref 150–450)
POTASSIUM SERPL-SCNC: 4.7 MMOL/L (ref 3.4–5.3)
PROT SERPL-MCNC: 7.2 G/DL (ref 6.8–8.8)
RBC # BLD AUTO: 4.68 10E12/L (ref 4.4–5.9)
SODIUM SERPL-SCNC: 141 MMOL/L (ref 133–144)
TRIGL SERPL-MCNC: 153 MG/DL
TSH SERPL DL<=0.005 MIU/L-ACNC: 2.63 MU/L (ref 0.4–4)
WBC # BLD AUTO: 6.4 10E9/L (ref 4–11)

## 2017-11-14 PROCEDURE — 80061 LIPID PANEL: CPT | Performed by: INTERNAL MEDICINE

## 2017-11-14 PROCEDURE — G0008 ADMIN INFLUENZA VIRUS VAC: HCPCS | Performed by: INTERNAL MEDICINE

## 2017-11-14 PROCEDURE — 84443 ASSAY THYROID STIM HORMONE: CPT | Performed by: INTERNAL MEDICINE

## 2017-11-14 PROCEDURE — 85027 COMPLETE CBC AUTOMATED: CPT | Performed by: INTERNAL MEDICINE

## 2017-11-14 PROCEDURE — 36415 COLL VENOUS BLD VENIPUNCTURE: CPT | Performed by: INTERNAL MEDICINE

## 2017-11-14 PROCEDURE — 90662 IIV NO PRSV INCREASED AG IM: CPT | Performed by: INTERNAL MEDICINE

## 2017-11-14 PROCEDURE — 82043 UR ALBUMIN QUANTITATIVE: CPT | Performed by: INTERNAL MEDICINE

## 2017-11-14 PROCEDURE — G0439 PPPS, SUBSEQ VISIT: HCPCS | Performed by: INTERNAL MEDICINE

## 2017-11-14 PROCEDURE — 80053 COMPREHEN METABOLIC PANEL: CPT | Performed by: INTERNAL MEDICINE

## 2017-11-14 RX ORDER — MIRTAZAPINE 7.5 MG/1
7.5 TABLET, FILM COATED ORAL AT BEDTIME
Qty: 90 TABLET | Refills: 3 | Status: SHIPPED | OUTPATIENT
Start: 2017-11-14 | End: 2018-05-16

## 2017-11-14 RX ORDER — ALLOPURINOL 100 MG/1
100 TABLET ORAL 2 TIMES DAILY
Qty: 180 TABLET | Refills: 3 | Status: SHIPPED | OUTPATIENT
Start: 2017-11-14 | End: 2018-11-16

## 2017-11-14 RX ORDER — FINASTERIDE 5 MG/1
5 TABLET, FILM COATED ORAL DAILY
Qty: 90 TABLET | Refills: 3 | Status: SHIPPED | OUTPATIENT
Start: 2017-11-14 | End: 2018-11-16

## 2017-11-14 RX ORDER — METOPROLOL TARTRATE 50 MG
50 TABLET ORAL 2 TIMES DAILY
Qty: 180 TABLET | Refills: 3 | Status: SHIPPED | OUTPATIENT
Start: 2017-11-14 | End: 2018-11-16

## 2017-11-14 ASSESSMENT — PAIN SCALES - GENERAL: PAINLEVEL: NO PAIN (0)

## 2017-11-14 ASSESSMENT — PATIENT HEALTH QUESTIONNAIRE - PHQ9
10. IF YOU CHECKED OFF ANY PROBLEMS, HOW DIFFICULT HAVE THESE PROBLEMS MADE IT FOR YOU TO DO YOUR WORK, TAKE CARE OF THINGS AT HOME, OR GET ALONG WITH OTHER PEOPLE: NOT DIFFICULT AT ALL
SUM OF ALL RESPONSES TO PHQ QUESTIONS 1-9: 13
SUM OF ALL RESPONSES TO PHQ QUESTIONS 1-9: 13

## 2017-11-14 NOTE — NURSING NOTE
"Chief Complaint   Patient presents with     Wellness Visit       Initial /84  Pulse 72  Temp 97.5  F (36.4  C) (Temporal)  Resp 20  Wt 244 lb (110.7 kg)  SpO2 98%  BMI 36.03 kg/m2 Estimated body mass index is 36.03 kg/(m^2) as calculated from the following:    Height as of 10/11/17: 5' 9\" (1.753 m).    Weight as of this encounter: 244 lb (110.7 kg).  Medication Reconciliation: complete    "

## 2017-11-14 NOTE — MR AVS SNAPSHOT
After Visit Summary   11/14/2017    Endy Navarro    MRN: [id number]           Patient Information     Date Of Birth          1940        Visit Information        Provider Department      11/14/2017 9:00 AM Richi Alvarez MD [facility]        Today's Diagnoses     Encounter for routine adult health examination without abnormal findings    -  1    Essential hypertension with goal blood pressure less than 140/90        Chronic atrial fibrillation (H)        S/P AVR (aortic valve replacement)        Hypertension goal BP (blood pressure) < 140/90        Hyperlipidemia LDL goal <100        S/P AVR        Benign nodular prostatic hyperplasia with lower urinary tract symptoms        Acute gouty arthritis         (ventriculoperitoneal) shunt status          Care Instructions      Preventive Health Recommendations:       Male Ages 65 and over    Yearly exam:             See your health care provider every year in order to  o   Review health changes.   o   Discuss preventive care.    o   Review your medicines if your doctor has prescribed any.    Talk with your health care provider about whether you should have a test to screen for prostate cancer (PSA).    Every 3 years, have a diabetes test (fasting glucose). If you are at risk for diabetes, you should have this test more often.    Every 5 years, have a cholesterol test. Have this test more often if you are at risk for high cholesterol or heart disease.     Every 10 years, have a colonoscopy. Or, have a yearly FIT test (stool test). These exams will check for colon cancer.    Talk to with your health care provider about screening for Abdominal Aortic Aneurysm if you have a family history of AAA or have a history of smoking.  Shots:     Get a flu shot each year.     Get a tetanus shot every 10 years.     Talk to your doctor about your pneumonia vaccines. There are now two you should receive - Pneumovax (PPSV 23) and Prevnar (PCV  13).    Talk to your doctor about a shingles vaccine.     Talk to your doctor about the hepatitis B vaccine.  Nutrition:     Eat at least 5 servings of fruits and vegetables each day.     Eat whole-grain bread, whole-wheat pasta and brown rice instead of white grains and rice.     Talk to your doctor about Calcium and Vitamin D.   Lifestyle    Exercise for at least 150 minutes a week (30 minutes a day, 5 days a week). This will help you control your weight and prevent disease.     Limit alcohol to one drink per day.     No smoking.     Wear sunscreen to prevent skin cancer.     See your dentist every six months for an exam and cleaning.     See your eye doctor every 1 to 2 years to screen for conditions such as glaucoma, macular degeneration and cataracts.          Follow-ups after your visit        Your next 10 appointments already scheduled     Nov 15, 2017  8:15 AM CST   CT HEAD W/O CONTRAST with PHCT1   [facility] CT Scan ([facility])    [address] [place] [id number] [phone]           Please bring any scans or X-rays taken at other hospitals, if similar tests were done. Also bring a list of your medicines, including vitamins, minerals and over-the-counter drugs. It is safest to leave personal items at home.  Be sure to tell your doctor:   If you have any allergies.   If there s any chance you are pregnant.   If you are breastfeeding.   If you have any special needs.  You do not need to do anything special to prepare.  Please wear loose clothing, such as a sweat suit or jogging clothes. Avoid snaps, zippers and other metal. We may ask you to undress and put on a hospital gown.              Future tests that were ordered for you today     Open Future Orders        Priority Expected Expires Ordered    CT Head w/o Contrast Routine  11/14/2018 11/14/2017            Who to contact     If you have questions or need follow up information about today's clinic visit or your  schedule please contact [facility] directly at [phone].  Normal or non-critical lab and imaging results will be communicated to you by MyChart, letter or phone within 4 business days after the clinic has received the results. If you do not hear from us within 7 days, please contact the clinic through uiuhart or phone. If you have a critical or abnormal lab result, we will notify you by phone as soon as possible.  Submit refill requests through [employer] or call your pharmacy and they will forward the refill request to us. Please allow 3 business days for your refill to be completed.          Additional Information About Your Visit        uiuhar[employer] Information     [employer] gives you secure access to your electronic health record. If you see a primary care provider, you can also send messages to your care team and make appointments. If you have questions, please call your primary care clinic.  If you do not have a primary care provider, please call [phone] and they will assist you.        Care EveryWhere ID     This is your Care EveryWhere ID. This could be used by other organizations to access your [place] medical records  [id number]        Your Vitals Were     Pulse Temperature Respirations Pulse Oximetry BMI (Body Mass Index)       72 97.5  F (36.4  C) (Temporal) 20 98% 36.03 kg/m2        Blood Pressure from Last 3 Encounters:   11/14/17 142/84   10/11/17 130/88   06/08/17 111/85    Weight from Last 3 Encounters:   11/14/17 244 lb (110.7 kg)   10/11/17 245 lb 8 oz (111.4 kg)   06/06/17 238 lb 8 oz (108.2 kg)              We Performed the Following     Albumin Random Urine Quantitative with Creat Ratio     CBC with platelets     Comprehensive metabolic panel     Lipid Profile     TSH          Where to get your medicines      These medications were sent to [email or web address] - [place], [place] - [address]  [address], [facility] [address]     Phone:  [phone]     allopurinol 100 MG tablet     finasteride 5 MG tablet    FLUoxetine 20 MG capsule    metoprolol 50 MG tablet    mirtazapine 7.5 MG Tabs tablet          Primary Care Provider Office Phone # Fax #    Richi Alvarez [phone]       [facility] [address] [place] [address]        Equal Access to Services     [facility]: Hadii aad ku hadasho Soomaali, waaxda luqadaha, qaybta kaalmada adeegyada, waxay idiin hayaan adeeg khharindersh la'aan . So [facility] [phone].    ATENCIÓN: Si habla español, tiene a leon disposición servicios gratuitos de asistencia lingüística. [facility] [phone].    We comply with applicable federal civil rights laws and [place] laws. We do not discriminate on the basis of race, color, national origin, age, disability, sex, sexual orientation, or gender identity.            Thank you!     Thank you for choosing [facility]  for your care. Our goal is always to provide you with excellent care. Hearing back from our patients is one way we can continue to improve our services. Please take a few minutes to complete the written survey that you may receive in the mail after your visit with us. Thank you!             Your Updated Medication List - Protect others around you: Learn how to safely use, store and throw away your medicines at www.disposemymeds.org.          This list is accurate as of: 11/14/17  9:54 AM.  Always use your most recent med list.                   Brand Name Dispense Instructions for use Diagnosis    acetaminophen 650 MG 8 hour tablet     100 tablet    Take 650 mg by mouth every 6 hours    S/P AVR       allopurinol 100 MG tablet    ZYLOPRIM    180 tablet    Take 1 tablet (100 mg) by mouth 2 times daily    Acute gouty arthritis       apixaban ANTICOAGULANT 5 MG tablet    ELIQUIS    180 tablet    Take 1 tablet (5 mg) by mouth 2 times daily    S/P AVR       aspirin 81 MG EC tablet      Take 1 tablet (81 mg) by mouth daily    S/P AVR       atorvastatin 40 MG  tablet    LIPITOR    90 tablet    Take 1 tablet (40 mg) by mouth daily    Hyperlipidemia LDL goal <100       diltiazem 120 MG 24 hr capsule    DILACOR XR    90 capsule    Take 1 capsule (120 mg) by mouth daily    S/P AVR       finasteride 5 MG tablet    PROSCAR    90 tablet    Take 1 tablet (5 mg) by mouth daily    Benign nodular prostatic hyperplasia with lower urinary tract symptoms       FLUoxetine 20 MG capsule    PROzac    270 capsule    Take 3 capsules (60 mg) by mouth daily    S/P AVR       metoprolol 50 MG tablet    LOPRESSOR    180 tablet    Take 1 tablet (50 mg) by mouth 2 times daily    S/P AVR       mirtazapine 7.5 MG Tabs tablet    REMERON    90 tablet    Take 1 tablet (7.5 mg) by mouth At Bedtime    S/P AVR       nitroGLYcerin 0.4 MG sublingual tablet    NITROSTAT    25 tablet    Place 1 tablet (0.4 mg) under the tongue every 5 minutes as needed for chest pain    Coronary artery disease involving native coronary artery of native heart without angina pectoris       polyethylene glycol Packet    MIRALAX/GLYCOLAX    7 packet    Take 17 g by mouth daily    S/P AVR

## 2017-11-14 NOTE — PROGRESS NOTES
Screening Questionnaire for Adult Immunization    Are you sick today?   No   Do you have allergies to medications, food, a vaccine component or latex?   Yes   Have you ever had a serious reaction after receiving a vaccination?   No   Do you have a long-term health problem with heart disease, lung disease, asthma, kidney disease, metabolic disease (e.g. diabetes), anemia, or other blood disorder?   Yes   Do you have cancer, leukemia, HIV/AIDS, or any other immune system problem?   No   In the past 3 months, have you taken medications that affect  your immune system, such as prednisone, other steroids, or anticancer drugs; drugs for the treatment of rheumatoid arthritis, Crohn s disease, or psoriasis; or have you had radiation treatments?   No   Have you had a seizure, or a brain or other nervous system problem?   No   During the past year, have you received a transfusion of blood or blood     products, or been given immune (gamma) globulin or antiviral drug?   No   For women: Are you pregnant or is there a chance you could become        pregnant during the next month?   No   Have you received any vaccinations in the past 4 weeks?   No     Immunization questionnaire was positive for at least one answer.  Notified yes.        Per orders of Dr. Richi Alvarez, injection of HD influenza given by Jennifer Hernandez. Patient instructed to remain in clinic for 15 minutes afterwards, and to report any adverse reaction to me immediately.       Screening performed by Jennifer Hernandez on 11/14/2017 at 9:54 AM.    Injectable Influenza Immunization Documentation    1.  Is the person to be vaccinated sick today?   No    2. Does the person to be vaccinated have an allergy to a component   of the vaccine?   No  Egg Allergy Algorithm Link    3. Has the person to be vaccinated ever had a serious reaction   to influenza vaccine in the past?   No    4. Has the person to be vaccinated ever had Guillain-Barré syndrome?   No    Form completed  by Jennifer Hernandez MA

## 2017-11-14 NOTE — PROGRESS NOTES
SUBJECTIVE:   Endy Navarro is a 77 year old male who presents for Preventive Visit.      Are you in the first 12 months of your Medicare Part B coverage?  No    Healthy Habits:    Do you get at least three servings of calcium containing foods daily (dairy, green leafy vegetables, etc.)? no    Amount of exercise or daily activities, outside of work: none    Problems taking medications regularly No    Medication side effects: No    Have you had an eye exam in the past two years? yes    Do you see a dentist twice per year? no    Do you have sleep apnea, excessive snoring or daytime drowsiness?no    COGNITIVE SCREEN  1) Repeat 3 items (Banana, Sunrise, Chair)    2) Clock draw: NORMAL  3) 3 item recall: Recalls 1 object   Results: NORMAL clock, 1-2 items recalled: COGNITIVE IMPAIRMENT LESS LIKELY    Mini-CogTM Copyright S Jose Ramon. Licensed by the author for use in [facility]; reprinted with permission (benjamin@[facility]). All rights reserved.        Patient is having some shortness of breath. He just saw cardiology but is unable to walk very far. He had normal echo and his valve is doing well. He has chronic A. fib and is on anticoagulation. He needs to lose weight so he can get more active, continues to have back pain despite an injection, left hip and knee pain.  They're concerned because of the Eliqus patients are harder to lose weight    They will be going to [place] and he sees a cardiologist there.    Reviewed and updated as needed this visit by clinical staffTobacco  Allergies  Meds         Reviewed and updated as needed this visit by Provider        Social History   Substance Use Topics     Smoking status: Former Smoker     Smokeless tobacco: Never Used      Comment: quit 25 yr ago     Alcohol use 1.8 - 2.4 oz/week     3 - 4 Standard drinks or equivalent per week      Comment: Occ       The patient does not drink >3 drinks per day nor >7 drinks per week.     Today's PHQ-2 Score:   PHQ-2 ( 1999  [employer]) 11/14/2017 7/13/2015   Q1: Little interest or pleasure in doing things 2 1   Q2: Feeling down, depressed or hopeless 2 1   PHQ-2 Score 4 2   Q1: Little interest or pleasure in doing things More than half the days -   Q2: Feeling down, depressed or hopeless More than half the days -   PHQ-2 Score 4 -         Do you feel safe in your environment - Yes    Do you have a Health Care Directive?: Yes: Patient states has Advance Directive and will bring in a copy to clinic.    Current providers sharing in care for this patient include: Patient Care Team:  Richi Alvarez MD as PCP - General (Internal Medicine)      Hearing impairment: Yes,     Ability to successfully perform activities of daily living: Yes, no assistance needed     Fall risk:  Fallen 2 or more times in the past year?: Screen not completed for medical reason(s)  Any fall with injury in the past year?: Screen not completed for medical reason(s)      Home safety:  none identified      The following health maintenance items are reviewed in Epic and correct as of today:Health Maintenance   Topic Date Due     OP ANNUAL INR REFERRAL  08/19/2015     TETANUS IMMUNIZATION (SYSTEM ASSIGNED)  04/18/2017     INFLUENZA VACCINE (SYSTEM ASSIGNED)  09/01/2017     LIPID MONITORING Q1 YEAR  11/11/2017     MEDICARE ANNUAL WELLNESS VISIT  11/11/2017     FALL RISK ASSESSMENT  11/11/2017     PHQ-9 Q6 MONTHS  12/06/2017     DEPRESSION ACTION PLAN Q1 YR  06/06/2018     ADVANCE DIRECTIVE PLANNING Q5 YRS  06/06/2022     PNEUMOCOCCAL  Completed         Pneumonia Vaccine: Both have been done    ROS:  C: NEGATIVE for fever, chills, change in weight  INTEGUMENTARY/SKIN: Moles on his back  E: NEGATIVE for vision changes or irritation  E/M: NEGATIVE for ear, mouth and throat problems  RESP: Shortness of breath with exertion  B: NEGATIVE for masses, tenderness or discharge  CV: NEGATIVE for chest pain, palpitations or peripheral edema  GI: NEGATIVE for nausea, abdominal pain,  "heartburn, or change in bowel habits  : NEGATIVE for frequency, dysuria, or hematuria  MUSCULOSKELETAL: Back left hip and left knee pain better with a knee wrap  N: NEGATIVE for weakness, dizziness or paresthesias  E: NEGATIVE for temperature intolerance, skin/hair changes  H: NEGATIVE for bleeding problems  P: NEGATIVE for changes in mood or affect    OBJECTIVE:   /84  Pulse 72  Temp 97.5  F (36.4  C) (Temporal)  Resp 20  Wt 244 lb (110.7 kg)  SpO2 98%  BMI 36.03 kg/m2 Estimated body mass index is 36.03 kg/(m^2) as calculated from the following:    Height as of 10/11/17: 5' 9\" (1.753 m).    Weight as of this encounter: 244 lb (110.7 kg).  EXAM:   GENERAL: healthy, alert and no distress  EYES: Eyes grossly normal to inspection, PERRL and conjunctivae and sclerae normal  HENT: ear canals and TM's normal, nose and mouth without ulcers or lesions  NECK: no adenopathy, no asymmetry, masses, or scars and thyroid normal to palpation  RESP: lungs clear to auscultation - no rales, rhonchi or wheezes  CV: Irregularly irregular rhythm  ABDOMEN: Obese abdomen soft nontender with old scars  MS: Trace edema  SKIN: no suspicious lesions or rashes  NEURO: Normal strength and tone, mentation intact and speech normal  PSYCH: mentation appears normal, affect normal/bright    ASSESSMENT / PLAN:       ICD-10-CM    1. Encounter for routine adult health examination without abnormal findings Z00.00 CBC with platelets     Comprehensive metabolic panel     Albumin Random Urine Quantitative with Creat Ratio     Lipid Profile     TSH   2. Essential hypertension with goal blood pressure less than 140/90 I10 CBC with platelets     Comprehensive metabolic panel     Albumin Random Urine Quantitative with Creat Ratio     Lipid Profile     TSH   3. Chronic atrial fibrillation (H) I48.2 CBC with platelets     Comprehensive metabolic panel     Albumin Random Urine Quantitative with Creat Ratio     Lipid Profile     TSH   4. S/P AVR " "(aortic valve replacement) Z95.2 CBC with platelets     Comprehensive metabolic panel     Albumin Random Urine Quantitative with Creat Ratio     Lipid Profile     TSH   5. Hypertension goal BP (blood pressure) < 140/90 I10 CBC with platelets     Comprehensive metabolic panel     Albumin Random Urine Quantitative with Creat Ratio     Lipid Profile     TSH   6. Hyperlipidemia LDL goal <100 E78.5 CBC with platelets     Comprehensive metabolic panel     Albumin Random Urine Quantitative with Creat Ratio     Lipid Profile     TSH   7. S/P AVR Z95.2 mirtazapine (REMERON) 7.5 MG TABS tablet     FLUoxetine (PROZAC) 20 MG capsule     metoprolol (LOPRESSOR) 50 MG tablet   8. Benign nodular prostatic hyperplasia with lower urinary tract symptoms N40.1 finasteride (PROSCAR) 5 MG tablet   9. Acute gouty arthritis M10.9 allopurinol (ZYLOPRIM) 100 MG tablet      we'll refill his medications, check labs particularly for TSH and hemoglobin with shortness of breath. He will follow-up with his cardiologist in [place] for the A. fib, anticoagulation, and shortness of breath.    We will check his head CT with a history of hydrocephalus. He does have a  shunt and does get some headaches.    Would like to workup his left knee and hip pain with x-rays but they're labored for [place] and we'll try to do this in the spring.    BPH is better urination on proscar, had urinary hesitancy .  End of Life Planning:  Patient currently has an advanced directive: Yes.  Practitioner is supportive of decision.    COUNSELING:  Reviewed preventive health counseling, as reflected in patient instructions       Regular exercise       Healthy diet/nutrition       Immunizations    Vaccinated for: Influenza              Estimated body mass index is 36.03 kg/(m^2) as calculated from the following:    Height as of 10/11/17: 5' 9\" (1.753 m).    Weight as of this encounter: 244 lb (110.7 kg).  Weight management plan: Discussed healthy diet and exercise guidelines " and patient will follow up in 12 months in clinic to re-evaluate.   reports that he has quit smoking. He has never used smokeless tobacco.        Appropriate preventive services were discussed with this patient, including applicable screening as appropriate for cardiovascular disease, diabetes, osteopenia/osteoporosis, and glaucoma.  As appropriate for age/gender, discussed screening for colorectal cancer, prostate cancer, breast cancer, and cervical cancer. Checklist reviewing preventive services available has been given to the patient.    Reviewed patients plan of care and provided an AVS. The Basic Care Plan (routine screening as documented in Health Maintenance) for Endy meets the Care Plan requirement. This Care Plan has been established and reviewed with the Patient and spouse.    Counseling Resources:  ATP IV Guidelines  Pooled Cohorts Equation Calculator  Breast Cancer Risk Calculator  FRAX Risk Assessment  ICSI Preventive Guidelines  Dietary Guidelines for Americans, 2010  USDA's MyPlate  ASA Prophylaxis  Lung CA Screening    Richi Alvarez MD  [facility]

## 2017-11-15 ENCOUNTER — HOSPITAL ENCOUNTER (OUTPATIENT)
Dept: CT IMAGING | Facility: CLINIC | Age: 77
Discharge: HOME | End: 2017-11-15
Attending: INTERNAL MEDICINE | Admitting: INTERNAL MEDICINE
Payer: MEDICARE

## 2017-11-15 DIAGNOSIS — Z98.2 VP (VENTRICULOPERITONEAL) SHUNT STATUS: ICD-10-CM

## 2017-11-15 PROCEDURE — 70450 CT HEAD/BRAIN W/O DYE: CPT

## 2018-05-05 ENCOUNTER — APPOINTMENT (OUTPATIENT)
Dept: GENERAL RADIOLOGY | Facility: CLINIC | Age: 78
End: 2018-05-05
Attending: FAMILY MEDICINE
Payer: MEDICARE

## 2018-05-05 ENCOUNTER — HOSPITAL ENCOUNTER (EMERGENCY)
Facility: CLINIC | Age: 78
Discharge: HOME | End: 2018-05-05
Attending: FAMILY MEDICINE | Admitting: FAMILY MEDICINE
Payer: MEDICARE

## 2018-05-05 VITALS
BODY MASS INDEX: 33.97 KG/M2 | WEIGHT: 230 LBS | RESPIRATION RATE: 16 BRPM | HEART RATE: 91 BPM | DIASTOLIC BLOOD PRESSURE: 90 MMHG | OXYGEN SATURATION: 97 % | TEMPERATURE: 97.5 F | SYSTOLIC BLOOD PRESSURE: 149 MMHG

## 2018-05-05 DIAGNOSIS — M47.817 SPONDYLOSIS OF LUMBOSACRAL REGION, UNSPECIFIED SPINAL OSTEOARTHRITIS COMPLICATION STATUS: ICD-10-CM

## 2018-05-05 PROCEDURE — 99284 EMERGENCY DEPT VISIT MOD MDM: CPT | Performed by: FAMILY MEDICINE

## 2018-05-05 PROCEDURE — 99284 EMERGENCY DEPT VISIT MOD MDM: CPT | Mod: Z6 | Performed by: FAMILY MEDICINE

## 2018-05-05 PROCEDURE — 72100 X-RAY EXAM L-S SPINE 2/3 VWS: CPT | Mod: TC

## 2018-05-05 PROCEDURE — 73502 X-RAY EXAM HIP UNI 2-3 VIEWS: CPT | Mod: TC

## 2018-05-05 PROCEDURE — A9270 NON-COVERED ITEM OR SERVICE: HCPCS | Mod: GY | Performed by: FAMILY MEDICINE

## 2018-05-05 PROCEDURE — 25000132 ZZH RX MED GY IP 250 OP 250 PS 637: Mod: GY | Performed by: FAMILY MEDICINE

## 2018-05-05 RX ORDER — HYDROCODONE BITARTRATE AND ACETAMINOPHEN 5; 325 MG/1; MG/1
1 TABLET ORAL EVERY 4 HOURS PRN
Qty: 12 TABLET | Refills: 0 | Status: SHIPPED | OUTPATIENT
Start: 2018-05-05 | End: 2018-05-31

## 2018-05-05 RX ORDER — HYDROCODONE BITARTRATE AND ACETAMINOPHEN 5; 325 MG/1; MG/1
1-2 TABLET ORAL ONCE
Status: COMPLETED | OUTPATIENT
Start: 2018-05-05 | End: 2018-05-05

## 2018-05-05 RX ADMIN — HYDROCODONE BITARTRATE AND ACETAMINOPHEN 2 TABLET: 5; 325 TABLET ORAL at 08:07

## 2018-05-05 NOTE — ED TRIAGE NOTES
Patient comes in with increased falls. States his legs (especially the left one) just gives out on him. He does have a history of back pain and steroid injections in the back.

## 2018-05-05 NOTE — ED PROVIDER NOTES
History     Chief Complaint   Patient presents with     Hip Pain     HPI  Endy Navarro is a 77 year old male who presents with concerns of left hip pain after a fall last night.  Patient had a hard time getting up and walking this morning.  They actually had to call 911 to help the patient get up last night.  Patient has been able to bear some weight but it is very hard for him to get around even with his walker.  Patient has had increasing falls here recently and has had worsening hip and back pain also.  Patient has a history of some chronic back issues and has had a steroid injection in his back but not for more than a year and a half.  Patient denies any extremity numbness.  Patient denies any bowel or bladder incontinence.  Patient denies any headaches.  Patient has been taking Tylenol with minimal relief.    Problem List:    Patient Active Problem List    Diagnosis Date Noted     Essential hypertension with goal blood pressure less than 140/90 11/11/2016     Priority: Medium     Major depressive disorder, recurrent episode, mild (H) 11/11/2016     Priority: Medium     Chronic atrial fibrillation (H) 11/11/2016     Priority: Medium     Hypertension goal BP (blood pressure) < 140/90 12/11/2015     Priority: Medium     S/P AVR (aortic valve replacement) 10/05/2015     Priority: Medium     Transient hyperglycemia post procedure 10/05/2015     Priority: Medium     Anemia due to blood loss, acute 10/05/2015     Priority: Medium     Delirium 10/05/2015     Priority: Medium     Mild major depression (H) 10/15/2012     Priority: Medium     Advanced directives, counseling/discussion 05/15/2012     Priority: Medium     Patient states has Advance Directive and will bring in a copy to clinic. 5/15/2012 [facility]         HYPERLIPIDEMIA LDL GOAL <100 10/31/2010     Priority: Medium     Gout      Priority: Medium     Problem list name updated by automated process. Provider to review       Chronic ischemic heart disease  04/19/2005     Priority: Medium     Problem list name updated by automated process. Provider to review          Past Medical History:    Past Medical History:   Diagnosis Date     Anxiety state, unspecified      Atrial fibrillation (H)      Bell's palsy 12/12/1997     Bioprosthetic aortic valve replacement during current hospitalization      Coronary atherosclerosis of unspecified type of vessel, native or graft      Gout, unspecified      Hydrocephalus 2015     Old myocardial infarction 3/1998     Osteoarthrosis, unspecified whether generalized or localized, unspecified site      Other and unspecified hyperlipidemia      Paroxysmal supraventricular tachycardia (H)      Pure hypercholesterolemia      Stented coronary artery        Past Surgical History:    Past Surgical History:   Procedure Laterality Date     C EXPLORATORY OF ABDOMEN  1/25/2007    Exploratory laparotomy.  Lysis of adhesions with reduction of internal hernia.     C ROTATOR CUFF STRAP      s/p rotator cuff surgery     COLONOSCOPY N/A 12/5/2016    Procedure: COMBINED COLONOSCOPY, SINGLE OR MULTIPLE BIOPSY/POLYPECTOMY BY BIOPSY;  Surgeon: Benjamin Cavazos MD;  Location:  GI     HC COLONOSCOPY THRU STOMA, DIAGNOSTIC  8/23/2004     HC KNEE SCOPE,MED/LAT MENISCUS REPAIR       HC REMOVE TONSILS/ADENOIDS,<13 Y/O      unsure of age     Hydrocephalus  2015    shunt placed     INJECT EPIDURAL LUMBAR N/A 6/8/2017    Procedure: INJECT EPIDURAL LUMBAR;  lumbar epidural steroid injection Left Lumbar 5 to sacral 1;  Surgeon: Pawan Davenport MD;  Location: [facility] OR     REMOVAL OF SPERM DUCT(S)      Vasectomy     REPLACE VALVE AORTIC N/A 9/14/2015    Procedure: REPLACE VALVE AORTIC;  Surgeon: Sang Chan MD;  Location:  OR       Family History:    Family History   Problem Relation Age of Onset     CEREBROVASCULAR DISEASE Mother      CEREBROVASCULAR DISEASE Father      Hypertension Father      C.A.D. Sister      C.A.D. Brother      DIABETES Maternal  Aunt      Prostate Cancer Brother      Cancer - colorectal No family hx of        Social History:  Marital Status:   [2]  Social History   Substance Use Topics     Smoking status: Former Smoker     Smokeless tobacco: Never Used      Comment: quit 25 yr ago     Alcohol use 1.8 - 2.4 oz/week     3 - 4 Standard drinks or equivalent per week      Comment: Occ        Medications:      acetaminophen 650 MG TABS   allopurinol (ZYLOPRIM) 100 MG tablet   apixaban ANTICOAGULANT (ELIQUIS) 5 MG tablet   aspirin EC 81 MG EC tablet   atorvastatin (LIPITOR) 40 MG tablet   diltiazem (DILACOR XR) 120 MG 24 hr capsule   finasteride (PROSCAR) 5 MG tablet   FLUoxetine (PROZAC) 20 MG capsule   metoprolol (LOPRESSOR) 50 MG tablet   mirtazapine (REMERON) 7.5 MG TABS tablet   nitroglycerin (NITROSTAT) 0.4 MG SL tablet   polyethylene glycol (MIRALAX/GLYCOLAX) packet         Review of Systems   All other systems reviewed and are negative.      Physical Exam   BP: (!) 155/94  Pulse: 91  Temp: 97.5  F (36.4  C)  Resp: 16  Weight: 104.3 kg (230 lb)  SpO2: 97 %      Physical Exam   Constitutional: He appears well-developed and well-nourished. No distress.   HENT:   Head: Atraumatic.   Cardiovascular: Normal rate and normal heart sounds.    No murmur heard.  Pulmonary/Chest: Effort normal and breath sounds normal. No respiratory distress. He has no wheezes.   Abdominal: Soft. Bowel sounds are normal. He exhibits no distension. There is no tenderness. There is no rebound.   Musculoskeletal:        Left hip: He exhibits normal range of motion, normal strength, no tenderness, no bony tenderness and no swelling.        Lumbar back: He exhibits normal range of motion, no tenderness, no bony tenderness, no swelling, no edema, no deformity, no laceration, no pain and no spasm.   Skin: He is not diaphoretic.   Nursing note and vitals reviewed.      ED Course     ED Course     Procedures                 Results for orders placed or performed during  the hospital encounter of 05/05/18 (from the past 24 hour(s))   XR Hip Left 2-3 Views    Narrative    XR HIP LEFT 2-3 VIEWS 5/5/2018 8:26 AM    HISTORY: fall, left hip pain;       Impression    IMPRESSION: Negative.    BREEZY DIAZ MD   XR Lumbar Spine 2/3 Views    Narrative    XR LUMBAR SPINE 2-3 VIEWS 5/5/2018 8:26 AM    HISTORY: fall, left hip and back pain;       Impression    IMPRESSION: Mild lumbar curve to the left. Degenerative facet disease  lower lumbar spine. No other findings.    BREEZY DIAZ MD       Medications   HYDROcodone-acetaminophen (NORCO) 5-325 MG per tablet 1-2 tablet (2 tablets Oral Given 5/5/18 0807)     X-rays were unremarkable of the lower back and hip.  There is some signs of arthritis.  Patient received the above medication and actually feels great.  Patient was able to walk without significant difficulty.  I think the patient's increasing falls and pain is most likely an exacerbation of his back again.  He may benefit from a steroid injection again to see if this helps.  Patient was given in a limited supply of Vicodin to go home with.  Patient was told to use Tylenol during the day and use the hydrocodone for only severe breakthrough pain.  Patient will be seen Dr. Alvarez this coming week for follow-up which I think is appropriate.    Assessments & Plan (with Medical Decision Making)  Osteoarthritis of the lumbar spine     I have reviewed the nursing notes.    I have reviewed the findings, diagnosis, plan and need for follow up with the patient.          5/5/2018   [facility] EMERGENCY DEPARTMENT     Rogelio Ruiz MD  05/05/18 4039

## 2018-05-05 NOTE — DISCHARGE INSTRUCTIONS
Osteoarthritis  Osteoarthritis (also called degenerative joint disease) happens when the cartilage in a joint becomes damaged and worn. This may be due to age, wear and tear, overuse of the joint, or other problems. Osteoarthritis can affect any joint. But it is most common in hands, knees, spine, hips, and feet. Symptoms include joint stiffness, pain, and swelling.  Home care    When a joint is more sore than usual, rest it for a day or two.    Heat can help relieve stiffness. Take a hot bath or apply a heating pad for up to 30 minutes at a time. If symptoms are worse in the morning, using heat just after awakening can help relax the muscle and soothe the joints.     Ice helps relieve pain and swelling. It is often used after activity. Use a cold pack wrapped in a thin cloth on the joint for 10 to 15 minutes at a time.     Alternating hot and cold can also help relieve pain. Try this for 20 minutes at a time, several times per day.    Exercise helps prevent the muscles and ligaments around the joint from becoming weak. It also helps maintain function in the joint.  Be as active as you can. Talk to your healthcare provider about what activity program is best for you.    Excess weight puts a lot of extra strain on weight-bearing joints of the lower back, hips, knees, feet and ankles. If you are overweight, talk to your healthcare provider about a safe and effective weight loss program.    Use anti-inflammatory medicines as prescribed for pain. This includes acetaminophen or NSAIDs such as ibuprofen or naproxen. If needed, topical or injected medicines may be recommended. Talk to your healthcare provider if these options are not enough to manage your pain.    Talk with your healthcare provider about devices that might help improve your function and reduce pain.  Follow-up care  Follow up with your healthcare provider as advised by our staff.  When to seek medical advice  Call your healthcare provider right away if  any of these occur:    Redness or swelling of a painful joint    Discharge or pus from a painful joint    Fever of 100.4 F (38 C) or higher, or as directed by your healthcare provider    Worsening joint pain    Decreased ability to move the joint or bear weight on the joint  Date Last Reviewed: 3/1/2017    6009-0739 [employer]. [address] [place] [address], [place] [address]. All rights reserved. This information is not intended as a substitute for professional medical care. Always follow your healthcare professional's instructions.          Osteoarthritis: Coping with Pain    There are many ways to control your pain. You re making a good start by learning about osteoarthritis and its treatments. Knowing more about this condition helps you work with your healthcare provider to find answers to problems. Keeping a positive outlook can help you manage pain from day to day. And making time each day to relax and enjoy yourself may help you control osteoarthritis pain, instead of letting it control you. Try these methods to help you cope with, and even reduce, your pain.  Take control  Relaxing may help relieve muscle aches that result from joint pain. To relax, try these techniques:    Breathe slowly and calmly and think of a peaceful scene.    Meditate by focusing your mind on one word, object, or idea.  Getting plenty of sleep can help reduce pain and let you function better. If pain is making it hard for you to sleep, ask your doctor about ways to control pain and ensure a good night s sleep. Cutting back on caffeine and alcohol can help you sleep better. So can going to bed and getting up at about the same time every day.  Use distraction  Getting your mind off the pain may seem hard to do. But it can actually help reduce pain. When you are in pain, try one of these ways of distracting yourself:    Watch a funny movie with a friend.    Listen to music you enjoy.    Read a novel.    Talk with friends or  family.    Go to a museum, park, or other favorite attraction.    Arrange to do a regular activity, such as volunteer work.  Heat and cold  Using heat and cold treatments are simple ways to lessen arthritis symptoms:    Heat soothes stiff joints and tired muscles. Heat works well before exercise, for example. Heat treatments include:  ? A warm shower or baths, or soak (for example, fill the sink with warm water and move your fingers, hands, and wrists around in the water)  ? A moist heating pad  ? A warm, moist wash cloth  ? An electric blanket or throw    Cold treatments help to numb painful areas and decrease swelling. Cold treatments include the following wrapped in a thin towel:  ? An ice pack or bag of ice  ? A gel-filled cold pack  ? A bag of frozen vegetables, like peas or corn  Be careful when using heat or cold. You can injure your skin. Each treatment should only last for 10 to 20 minutes. Your healthcare provider or therapist can give you specific instructions.  Acupuncture  Acupuncture is a 2000-year-old practice. Practitioners insert thin needles in specific parts of the body. Research shows that it can help to relieve the pain of arthritis.  For more information or to find a practitioner in your area, contact the American Academy of Medical Acupuncture. Its website is: http://www.medicalacupuncture.org/.  Massage  Therapeutic massage has many benefits. It may:    Help you and your muscles relax    Improve blood flow to muscles and joints    Help joints stay more flexible  Look for a certified massage therapist. Many are trained to treat sore muscles and joint pain and stiffness.  Vitamins, supplements, and herbs  People with arthritis, or other long-term conditions that cause pain, often look for alternative ways to lessen pain. Vitamins, supplements, and herbs may or may not help you to feel better. Before you try any vitamin, supplement, or herb, make sure you ask your healthcare provider or  pharmacist.  Physical therapy/occupational therapy  Evaluation by a physical therapist and or occupational therapist for assessment for limitations in activities of daily living  Assistance with developing an appropriate exercise routine for both muscle strengthening and cardiovascular health  Weight management  Studies have demonstrated that weight loss in overweight individuals can improve osteoarthritis symptoms.  Talk with your healthcare provider regarding your optimal weight and techniques for weight management if necessary.  Psychological treatments  Research shows that many psychological therapies or those that deal with thinking and emotions, help people cope with arthritis pain. Therapies include cognitive-behavioral therapy (CBT), pain coping skills training, biofeedback, stress management, and hypnosis. Ask your healthcare provider for more information about these therapies.  For more information about many of these methods, contact the National Center for Complementary and Alternative Medicine (NCCAM) at http://www.nccam.nih.gov.   Date Last Reviewed: 2/14/2016 2000-2017 [employer]. [address] [place] [address], [place] [address]. All rights reserved. This information is not intended as a substitute for professional medical care. Always follow your healthcare professional's instructions.

## 2018-05-05 NOTE — ED AVS SNAPSHOT
[facility] Emergency Department    [address] [place] [address]    Phone:  [phone]    Fax:  [phone]                                       Endy Navarro   MRN: [id number]    Department:  [facility] Emergency Department   Date of Visit:  5/5/2018           Patient Information     Date Of Birth          1940        Your diagnoses for this visit were:     Spondylosis of lumbosacral region, unspecified spinal osteoarthritis complication status        You were seen by Rogelio Ruiz MD.      Follow-up Information     Schedule an appointment as soon as possible for a visit with Richi Alvarez MD.    Specialty:  Internal Medicine    Why:  as scheduled    Contact information:    [address] [place] [id number] [phone]          Discharge Instructions         Osteoarthritis  Osteoarthritis (also called degenerative joint disease) happens when the cartilage in a joint becomes damaged and worn. This may be due to age, wear and tear, overuse of the joint, or other problems. Osteoarthritis can affect any joint. But it is most common in hands, knees, spine, hips, and feet. Symptoms include joint stiffness, pain, and swelling.  Home care    When a joint is more sore than usual, rest it for a day or two.    Heat can help relieve stiffness. Take a hot bath or apply a heating pad for up to 30 minutes at a time. If symptoms are worse in the morning, using heat just after awakening can help relax the muscle and soothe the joints.     Ice helps relieve pain and swelling. It is often used after activity. Use a cold pack wrapped in a thin cloth on the joint for 10 to 15 minutes at a time.     Alternating hot and cold can also help relieve pain. Try this for 20 minutes at a time, several times per day.    Exercise helps prevent the muscles and ligaments around the joint from becoming weak. It also helps maintain function in the joint.  Be as active as you can. Talk to  your healthcare provider about what activity program is best for you.    Excess weight puts a lot of extra strain on weight-bearing joints of the lower back, hips, knees, feet and ankles. If you are overweight, talk to your healthcare provider about a safe and effective weight loss program.    Use anti-inflammatory medicines as prescribed for pain. This includes acetaminophen or NSAIDs such as ibuprofen or naproxen. If needed, topical or injected medicines may be recommended. Talk to your healthcare provider if these options are not enough to manage your pain.    Talk with your healthcare provider about devices that might help improve your function and reduce pain.  Follow-up care  Follow up with your healthcare provider as advised by our staff.  When to seek medical advice  Call your healthcare provider right away if any of these occur:    Redness or swelling of a painful joint    Discharge or pus from a painful joint    Fever of 100.4 F (38 C) or higher, or as directed by your healthcare provider    Worsening joint pain    Decreased ability to move the joint or bear weight on the joint  Date Last Reviewed: 3/1/2017    0987-8034 [employer]. [address] [place] [address]. All rights reserved. This information is not intended as a substitute for professional medical care. Always follow your healthcare professional's instructions.          Osteoarthritis: Coping with Pain    There are many ways to control your pain. You re making a good start by learning about osteoarthritis and its treatments. Knowing more about this condition helps you work with your healthcare provider to find answers to problems. Keeping a positive outlook can help you manage pain from day to day. And making time each day to relax and enjoy yourself may help you control osteoarthritis pain, instead of letting it control you. Try these methods to help you cope with, and even reduce, your pain.  Take control  Relaxing may  help relieve muscle aches that result from joint pain. To relax, try these techniques:    Breathe slowly and calmly and think of a peaceful scene.    Meditate by focusing your mind on one word, object, or idea.  Getting plenty of sleep can help reduce pain and let you function better. If pain is making it hard for you to sleep, ask your doctor about ways to control pain and ensure a good night s sleep. Cutting back on caffeine and alcohol can help you sleep better. So can going to bed and getting up at about the same time every day.  Use distraction  Getting your mind off the pain may seem hard to do. But it can actually help reduce pain. When you are in pain, try one of these ways of distracting yourself:    Watch a funny movie with a friend.    Listen to music you enjoy.    Read a novel.    Talk with friends or family.    Go to a museum, park, or other favorite attraction.    Arrange to do a regular activity, such as volunteer work.  Heat and cold  Using heat and cold treatments are simple ways to lessen arthritis symptoms:    Heat soothes stiff joints and tired muscles. Heat works well before exercise, for example. Heat treatments include:  ? A warm shower or baths, or soak (for example, fill the sink with warm water and move your fingers, hands, and wrists around in the water)  ? A moist heating pad  ? A warm, moist wash cloth  ? An electric blanket or throw    Cold treatments help to numb painful areas and decrease swelling. Cold treatments include the following wrapped in a thin towel:  ? An ice pack or bag of ice  ? A gel-filled cold pack  ? A bag of frozen vegetables, like peas or corn  Be careful when using heat or cold. You can injure your skin. Each treatment should only last for 10 to 20 minutes. Your healthcare provider or therapist can give you specific instructions.  Acupuncture  Acupuncture is a 2000-year-old practice. Practitioners insert thin needles in specific parts of the body. Research shows  that it can help to relieve the pain of arthritis.  For more information or to find a practitioner in your area, contact the American Academy of Medical Acupuncture. Its website is: http://www.medicalacupuncture.org/.  Massage  Therapeutic massage has many benefits. It may:    Help you and your muscles relax    Improve blood flow to muscles and joints    Help joints stay more flexible  Look for a certified massage therapist. Many are trained to treat sore muscles and joint pain and stiffness.  Vitamins, supplements, and herbs  People with arthritis, or other long-term conditions that cause pain, often look for alternative ways to lessen pain. Vitamins, supplements, and herbs may or may not help you to feel better. Before you try any vitamin, supplement, or herb, make sure you ask your healthcare provider or pharmacist.  Physical therapy/occupational therapy  Evaluation by a physical therapist and or occupational therapist for assessment for limitations in activities of daily living  Assistance with developing an appropriate exercise routine for both muscle strengthening and cardiovascular health  Weight management  Studies have demonstrated that weight loss in overweight individuals can improve osteoarthritis symptoms.  Talk with your healthcare provider regarding your optimal weight and techniques for weight management if necessary.  Psychological treatments  Research shows that many psychological therapies or those that deal with thinking and emotions, help people cope with arthritis pain. Therapies include cognitive-behavioral therapy (CBT), pain coping skills training, biofeedback, stress management, and hypnosis. Ask your healthcare provider for more information about these therapies.  For more information about many of these methods, contact the National Center for Complementary and Alternative Medicine (NCCAM) at http://www.nccam.nih.gov.   Date Last Reviewed: 2/14/2016 2000-2017 [employer]  [address] [place] [address]. All rights reserved. This information is not intended as a substitute for professional medical care. Always follow your healthcare professional's instructions.          Your next 10 appointments already scheduled     May 16, 2018  7:00 AM CDT   Office Visit with Richi Alvarez MD   [facility]    [address] [place] [address]   [phone]           Bring a current list of meds and any records pertaining to this visit. For Physicals, please bring immunization records and any forms needing to be filled out. Please arrive 10 minutes early to complete paperwork.              24 Hour Appointment Hotline       To make an appointment at any [facility], call [phone] ([phone]). If you don't have a family doctor or clinic, we will help you find one. [facility] are conveniently located to serve the needs of you and your family.             Review of your medicines      START taking        Dose / Directions Last dose taken    HYDROcodone-acetaminophen 5-325 MG per tablet   Commonly known as:  NORCO   Dose:  1 tablet   Quantity:  12 tablet        Take 1 tablet by mouth every 4 hours as needed for pain maximum 10 tablet(s) per day   Refills:  0          Our records show that you are taking the medicines listed below. If these are incorrect, please call your family doctor or clinic.        Dose / Directions Last dose taken    acetaminophen 650 MG 8 hour tablet   Dose:  650 mg   Quantity:  100 tablet        Take 650 mg by mouth every 6 hours   Refills:  0        allopurinol 100 MG tablet   Commonly known as:  ZYLOPRIM   Dose:  100 mg   Quantity:  180 tablet        Take 1 tablet (100 mg) by mouth 2 times daily   Refills:  3        apixaban ANTICOAGULANT 5 MG tablet   Commonly known as:  ELIQUIS   Dose:  5 mg   Quantity:  180 tablet        Take 1 tablet (5 mg) by mouth 2 times daily   Refills:  3         aspirin 81 MG EC tablet   Dose:  81 mg        Take 1 tablet (81 mg) by mouth daily   Refills:  0        atorvastatin 40 MG tablet   Commonly known as:  LIPITOR   Dose:  40 mg   Quantity:  90 tablet        Take 1 tablet (40 mg) by mouth daily   Refills:  3        diltiazem 120 MG 24 hr capsule   Commonly known as:  DILACOR XR   Dose:  120 mg   Quantity:  90 capsule        Take 1 capsule (120 mg) by mouth daily   Refills:  3        finasteride 5 MG tablet   Commonly known as:  PROSCAR   Dose:  5 mg   Quantity:  90 tablet        Take 1 tablet (5 mg) by mouth daily   Refills:  3        FLUoxetine 20 MG capsule   Commonly known as:  PROzac   Dose:  60 mg   Quantity:  270 capsule        Take 3 capsules (60 mg) by mouth daily   Refills:  3        metoprolol tartrate 50 MG tablet   Commonly known as:  LOPRESSOR   Dose:  50 mg   Quantity:  180 tablet        Take 1 tablet (50 mg) by mouth 2 times daily   Refills:  3        mirtazapine 7.5 MG Tabs tablet   Commonly known as:  REMERON   Dose:  7.5 mg   Quantity:  90 tablet        Take 1 tablet (7.5 mg) by mouth At Bedtime   Refills:  3        nitroGLYcerin 0.4 MG sublingual tablet   Commonly known as:  NITROSTAT   Dose:  0.4 mg   Quantity:  25 tablet        Place 1 tablet (0.4 mg) under the tongue every 5 minutes as needed for chest pain   Refills:  4        polyethylene glycol Packet   Commonly known as:  MIRALAX/GLYCOLAX   Dose:  17 g   Quantity:  7 packet        Take 17 g by mouth daily   Refills:  0                Information about OPIOIDS     PRESCRIPTION OPIOIDS: WHAT YOU NEED TO KNOW   You have a prescription for an opioid (narcotic) pain medicine. Opioids can cause addiction. If you have a history of chemical dependency of any type, you are at a higher risk of becoming addicted to opioids. Only take this medicine after all other options have been tried. Take it for as short a time and as few doses as possible.     Do not:    Drive. If you drive while taking these  medicines, you could be arrested for driving under the influence (DUI).    Operate heavy machinery    Do any other dangerous activities while taking these medicines.     Drink any alcohol while taking these medicines.      Take with any other medicines that contain acetaminophen. Read all labels carefully. Look for the word  acetaminophen  or  Tylenol.  Ask your pharmacist if you have questions or are unsure.    Store your pills in a secure place, locked if possible. We will not replace any lost or stolen medicine. If you don t finish your medicine, please throw away (dispose) as directed by your pharmacist. The [place] Pollution Control Agency has more information about safe disposal: https://www.pca.[facility].mn.us/living-green/managing-unwanted-medications    All opioids tend to cause constipation. Drink plenty of water and eat foods that have a lot of fiber, such as fruits, vegetables, prune juice, apple juice and high-fiber cereal. Take a laxative (Miralax, milk of magnesia, Colace, Senna) if you don t move your bowels at least every other day.         Prescriptions were sent or printed at these locations (1 Prescription)                   Other Prescriptions                Printed at Department/Unit printer (1 of 1)         HYDROcodone-acetaminophen (NORCO) 5-325 MG per tablet                Procedures and tests performed during your visit     XR Hip Left 2-3 Views    XR Lumbar Spine 2/3 Views      Orders Needing Specimen Collection     None      Pending Results     No orders found from 5/3/2018 to 5/6/2018.            Pending Culture Results     No orders found from 5/3/2018 to 5/6/2018.            Pending Results Instructions     If you had any lab results that were not finalized at the time of your Discharge, you can call the ED Lab Result RN at [phone]. You will be contacted by this team for any positive Lab results or changes in treatment. The nurses are available 7 days a week from 10A to 6:30P.  You  can leave a message 24 hours per day and they will return your call.        Thank you for choosing [facility]       Thank you for choosing [facility] for your care. Our goal is always to provide you with excellent care. Hearing back from our patients is one way we can continue to improve our services. Please take a few minutes to complete the written survey that you may receive in the mail after you visit with us. Thank you!        [employer] Information     [employer] gives you secure access to your electronic health record. If you see a primary care provider, you can also send messages to your care team and make appointments. If you have questions, please call your primary care clinic.  If you do not have a primary care provider, please call [phone] and they will assist you.        Care EveryWhere ID     This is your Care EveryWhere ID. This could be used by other organizations to access your [facility] medical records  [id number]        Equal Access to Services     SHAUNNA VALENZUELA : Shawnee Carmona, aminta landin, kavya parker. So [facility] [phone].    ATENCIÓN: Si habla español, tiene a leon disposición servicios gratuitos de asistencia lingüística. Llame al [phone].    We comply with applicable federal civil rights laws and [place] laws. We do not discriminate on the basis of race, color, national origin, age, disability, sex, sexual orientation, or gender identity.            After Visit Summary       This is your record. Keep this with you and show to your community pharmacist(s) and doctor(s) at your next visit.

## 2018-05-05 NOTE — ED AVS SNAPSHOT
[facility] Emergency Department    [address] [place] [address]    Phone:  [phone]    Fax:  [phone]                                       Endy Navarro   MRN: [id number]    Department:  [facility] Emergency Department   Date of Visit:  5/5/2018           After Visit Summary Signature Page     I have received my discharge instructions, and my questions have been answered. I have discussed any challenges I see with this plan with the nurse or doctor.    ..........................................................................................................................................  Patient/Patient Representative Signature      ..........................................................................................................................................  Patient Representative Print Name and Relationship to Patient    ..................................................               ................................................  Date                                            Time    ..........................................................................................................................................  Reviewed by Signature/Title    ...................................................              ..............................................  Date                                                            Time

## 2018-05-07 ENCOUNTER — TELEPHONE (OUTPATIENT)
Dept: INTERNAL MEDICINE | Facility: CLINIC | Age: 78
End: 2018-05-07

## 2018-05-07 NOTE — TELEPHONE ENCOUNTER
Reason for Call: Request for an order or referral:    Order or referral being requested: Home care skilled nurisng, PT and OT to eval and treat    Date needed: as soon as possible    Has the patient been seen by the PCP for this problem? YES    Additional comments: need med list and 2 clinic notes    [phone]     *Home care nurse that called is his grand daughter    Phone number Patient can be reached at:  Other phone number:  Home care    Best Time:  any    Can we leave a detailed message on this number?  YES    Call taken on 5/7/2018 at 8:53 AM by Jacinta Esquivel

## 2018-05-08 NOTE — TELEPHONE ENCOUNTER
bAy from [facility] called asking for orders for Skilled Nursing 1 visit for 3 weeks, PT and OT eval and Treat. Was not sure I could talk to her because it's not the same person or wasn't sure it's the same Home Care services, sending back for confirmation. Please call Aby back if need be at [phone]

## 2018-05-09 ENCOUNTER — TELEPHONE (OUTPATIENT)
Dept: INTERNAL MEDICINE | Facility: CLINIC | Age: 78
End: 2018-05-09

## 2018-05-09 NOTE — TELEPHONE ENCOUNTER
Reason for Call: Request for an order or referral:    Order or referral being requested: Home care services, OT therapy for 2 times a for one week. 1-2 PRN visits for the second week    Date needed: as soon as possible    Has the patient been seen by the PCP for this problem? YES    Additional comments:     Phone number Patient can be reached at:  Home number on file [phone] (home)    Best Time:  any    Can we leave a detailed message on this number?  YES    Call taken on 5/9/2018 at 12:13 PM by Melany Wong

## 2018-05-16 ENCOUNTER — OFFICE VISIT (OUTPATIENT)
Dept: INTERNAL MEDICINE | Facility: CLINIC | Age: 78
End: 2018-05-16
Payer: MEDICARE

## 2018-05-16 VITALS
TEMPERATURE: 97.5 F | DIASTOLIC BLOOD PRESSURE: 84 MMHG | OXYGEN SATURATION: 98 % | HEIGHT: 69 IN | HEART RATE: 76 BPM | BODY MASS INDEX: 36.58 KG/M2 | RESPIRATION RATE: 16 BRPM | WEIGHT: 247 LBS | SYSTOLIC BLOOD PRESSURE: 148 MMHG

## 2018-05-16 DIAGNOSIS — R29.6 FALLS FREQUENTLY: Primary | ICD-10-CM

## 2018-05-16 DIAGNOSIS — I10 ESSENTIAL HYPERTENSION WITH GOAL BLOOD PRESSURE LESS THAN 140/90: ICD-10-CM

## 2018-05-16 DIAGNOSIS — R06.02 SOB (SHORTNESS OF BREATH): ICD-10-CM

## 2018-05-16 DIAGNOSIS — Z95.2 S/P AVR (AORTIC VALVE REPLACEMENT): ICD-10-CM

## 2018-05-16 DIAGNOSIS — Z98.2 VP (VENTRICULOPERITONEAL) SHUNT STATUS: ICD-10-CM

## 2018-05-16 DIAGNOSIS — M25.552 HIP PAIN, LEFT: ICD-10-CM

## 2018-05-16 DIAGNOSIS — F32.0 MILD MAJOR DEPRESSION (H): ICD-10-CM

## 2018-05-16 DIAGNOSIS — I48.20 CHRONIC ATRIAL FIBRILLATION (H): ICD-10-CM

## 2018-05-16 LAB
ALBUMIN SERPL-MCNC: 3.6 G/DL (ref 3.4–5)
ALP SERPL-CCNC: 82 U/L (ref 40–150)
ALT SERPL W P-5'-P-CCNC: 23 U/L (ref 0–70)
ANION GAP SERPL CALCULATED.3IONS-SCNC: 6 MMOL/L (ref 3–14)
AST SERPL W P-5'-P-CCNC: 12 U/L (ref 0–45)
BILIRUB SERPL-MCNC: 0.3 MG/DL (ref 0.2–1.3)
BUN SERPL-MCNC: 30 MG/DL (ref 7–30)
CALCIUM SERPL-MCNC: 9 MG/DL (ref 8.5–10.1)
CHLORIDE SERPL-SCNC: 109 MMOL/L (ref 94–109)
CO2 SERPL-SCNC: 28 MMOL/L (ref 20–32)
CREAT SERPL-MCNC: 1.33 MG/DL (ref 0.66–1.25)
ERYTHROCYTE [DISTWIDTH] IN BLOOD BY AUTOMATED COUNT: 14.7 % (ref 10–15)
GFR SERPL CREATININE-BSD FRML MDRD: 52 ML/MIN/1.7M2
GLUCOSE SERPL-MCNC: 97 MG/DL (ref 70–99)
HCT VFR BLD AUTO: 44.2 % (ref 40–53)
HGB BLD-MCNC: 14.2 G/DL (ref 13.3–17.7)
MCH RBC QN AUTO: 31.2 PG (ref 26.5–33)
MCHC RBC AUTO-ENTMCNC: 32.1 G/DL (ref 31.5–36.5)
MCV RBC AUTO: 97 FL (ref 78–100)
NT-PROBNP SERPL-MCNC: 2365 PG/ML (ref 0–450)
PLATELET # BLD AUTO: 170 10E9/L (ref 150–450)
POTASSIUM SERPL-SCNC: 4.8 MMOL/L (ref 3.4–5.3)
PROT SERPL-MCNC: 7.5 G/DL (ref 6.8–8.8)
RBC # BLD AUTO: 4.55 10E12/L (ref 4.4–5.9)
SODIUM SERPL-SCNC: 143 MMOL/L (ref 133–144)
T4 FREE SERPL-MCNC: 0.78 NG/DL (ref 0.76–1.46)
TSH SERPL DL<=0.005 MIU/L-ACNC: 4.3 MU/L (ref 0.4–4)
WBC # BLD AUTO: 8.4 10E9/L (ref 4–11)

## 2018-05-16 PROCEDURE — 84439 ASSAY OF FREE THYROXINE: CPT | Performed by: INTERNAL MEDICINE

## 2018-05-16 PROCEDURE — 36415 COLL VENOUS BLD VENIPUNCTURE: CPT | Performed by: INTERNAL MEDICINE

## 2018-05-16 PROCEDURE — 80053 COMPREHEN METABOLIC PANEL: CPT | Performed by: INTERNAL MEDICINE

## 2018-05-16 PROCEDURE — 99215 OFFICE O/P EST HI 40 MIN: CPT | Performed by: INTERNAL MEDICINE

## 2018-05-16 PROCEDURE — 84443 ASSAY THYROID STIM HORMONE: CPT | Performed by: INTERNAL MEDICINE

## 2018-05-16 PROCEDURE — 83880 ASSAY OF NATRIURETIC PEPTIDE: CPT | Performed by: INTERNAL MEDICINE

## 2018-05-16 PROCEDURE — 85027 COMPLETE CBC AUTOMATED: CPT | Performed by: INTERNAL MEDICINE

## 2018-05-16 RX ORDER — FUROSEMIDE 20 MG
20 TABLET ORAL DAILY
Qty: 30 TABLET | Refills: 1 | Status: SHIPPED | OUTPATIENT
Start: 2018-05-16 | End: 2018-06-20

## 2018-05-16 ASSESSMENT — PAIN SCALES - GENERAL: PAINLEVEL: SEVERE PAIN (7)

## 2018-05-16 NOTE — PROGRESS NOTES
SUBJECTIVE:   Endy Navarro is a 77 year old male who presents to clinic today for the following health issues:      Chief Complaint   Patient presents with     Back Pain     follow up on back leg pain, dealing with it for a couple of years but getting worse     He has had back injections before, last one in June of 2017.  1st shot was good, 2nd was less and 3rd didn't work.     He had been falling and therefore came to the ER.  Not much found, no fractures.  '    Lower back pains, maybe more left side, some in the knees.      Multiple issues, some sob, and swelling, weeping left leg in [place].    Past Medical History:   Diagnosis Date     Anxiety state, unspecified      Atrial fibrillation (H)      Bell's palsy 12/12/1997     Bioprosthetic aortic valve replacement during current hospitalization      Coronary atherosclerosis of unspecified type of vessel, native or graft     coronary artery disease with history of MI and stent placement      Gout, unspecified      Hydrocephalus 2015     Old myocardial infarction 3/1998    Hx of MI     Osteoarthrosis, unspecified whether generalized or localized, unspecified site     Diffuse migratory arthritis     Other and unspecified hyperlipidemia      Paroxysmal supraventricular tachycardia (H)     Hx of PAT     Pure hypercholesterolemia      Stented coronary artery      Current Outpatient Prescriptions   Medication     acetaminophen 650 MG TABS     allopurinol (ZYLOPRIM) 100 MG tablet     apixaban ANTICOAGULANT (ELIQUIS) 5 MG tablet     aspirin EC 81 MG EC tablet     atorvastatin (LIPITOR) 40 MG tablet     diltiazem (DILACOR XR) 120 MG 24 hr capsule     finasteride (PROSCAR) 5 MG tablet     FLUoxetine (PROZAC) 20 MG capsule     furosemide (LASIX) 20 MG tablet     HYDROcodone-acetaminophen (NORCO) 5-325 MG per tablet     metoprolol (LOPRESSOR) 50 MG tablet     mirtazapine (REMERON) 7.5 MG TABS tablet     polyethylene glycol (MIRALAX/GLYCOLAX) packet     nitroglycerin  "(NITROSTAT) 0.4 MG SL tablet     No current facility-administered medications for this visit.      Social History   Substance Use Topics     Smoking status: Former Smoker     Smokeless tobacco: Never Used      Comment: quit 25 yr ago     Alcohol use 1.8 - 2.4 oz/week     3 - 4 Standard drinks or equivalent per week      Comment: Occ     Review of Systems  Constitutional-Weight gain.  ENT-No earpain, sore throat, voice changes or rhinitis.  Cardiac-No chest pain or palpitations and Exertional SOB.  Respiratory-No cough, sob, or hemoptysis.  GI-No nausea, vomitting, diarrhea, constipation, or blood in the stool.  Musculoskeletal-+joint pains.  Neuro-imbalance.    Physical Exam  /84  Pulse 76  Temp 97.5  F (36.4  C) (Temporal)  Resp 16  Ht 5' 9\" (1.753 m)  Wt 247 lb (112 kg)  SpO2 98%  BMI 36.48 kg/m2  General Appearance-alert, mild distress  Cardiac-irregularly irregular rhythm  Lungs-clear to auscultation  GI-Soft, nontender.  Normal bowel sounds.  No hepatosplenomegaly or abnormal masses  Extremities-1+ pitting edema in both legs  Musculoskeletal-left hip has definite pain, knees have good range of motion, right hip is okay.  Back has mild tenderness on the left lower aspect  Skin-  Skin has some excoriations and healing on the left lower leg  Neurological-patient is alert, has poor balance, very difficult gait wide-based.  Romberg is negative, reflexes are all 2+ bilaterally      ASSESSMENT:  This a 77-year-old gentleman is very complex, he has a history of aortic valve replacement, hypertension, chronic atrial fibrillation.  He has a history of a ventriculoperitoneal shunt.  He just came back from [place].  He has had more episodes of falling over the last 2 weeks 7 times.  He has had back pain with previous injections.    Falls I am concerned that this is effect from his neurological  shunt.  He had a normal CT scan of his brain last fall.  Will refer him to neurology.  Also continue with physical " therapy and use the walker.  Due to the falls at night and when to stop his mirtazapine as well.    Fluid retention we will start her on Lasix 20 mg a day, check some labs.  He may need cardiology follow-up.    Left hip pain and back pain we will refer him to orthopedics for follow-up of this the hip x-ray was negative and the ER.  I would like to get an MRI but this can affect his  shunt so were going to have him see orthopedics he may be can do a CT scan or other workup.    He will continue on his anticoagulation.    I did explain to the patient and his wife that he needs to be very careful, uses walker at all times and avoid a fall or other injury.  He should follow-up with me in 2 weeks.    I spent greater than 45 minutes in direct face-to-face patient care with him and his wife on all the above issues.  Due to the complexity he needs very close follow-up.    Electronically signed by Richi Alvarez MD        Electronically signed by Richi Alvarez MD

## 2018-05-16 NOTE — MR AVS SNAPSHOT
"              After Visit Summary   5/16/2018    Endy Navarro    MRN: [id number]           Patient Information     Date Of Birth          1940        Visit Information        Provider Department      5/16/2018 7:00 AM Richi Alvarez MD [facility]         Follow-ups after your visit        Who to contact     If you have questions or need follow up information about today's clinic visit or your schedule please contact [facility] directly at [phone].  Normal or non-critical lab and imaging results will be communicated to you by MyChart, letter or phone within 4 business days after the clinic has received the results. If you do not hear from us within 7 days, please contact the clinic through [employer] or phone. If you have a critical or abnormal lab result, we will notify you by phone as soon as possible.  Submit refill requests through [employer] or call your pharmacy and they will forward the refill request to us. Please allow 3 business days for your refill to be completed.          Additional Information About Your Visit        MyChart Information     [employer] gives you secure access to your electronic health record. If you see a primary care provider, you can also send messages to your care team and make appointments. If you have questions, please call your primary care clinic.  If you do not have a primary care provider, please call [phone] and they will assist you.        Care EveryWhere ID     This is your Care EveryWhere ID. This could be used by other organizations to access your [place] medical records  [id number]        Your Vitals Were     Pulse Temperature Respirations Height Pulse Oximetry BMI (Body Mass Index)    76 97.5  F (36.4  C) (Temporal) 16 5' 9\" (1.753 m) 98% 36.48 kg/m2       Blood Pressure from Last 3 Encounters:   05/16/18 148/84   05/05/18 149/90   11/14/17 142/84    Weight from Last 3 Encounters:   05/16/18 247 lb (112 kg)   05/05/18 230 lb " (104.3 kg)   11/14/17 244 lb (110.7 kg)              Today, you had the following     No orders found for display       Primary Care Provider Office Phone # Fax #    Richi Alvarez [phone] [address] [facility] [address]        Equal Access to Services     HIATIYA NICOLAS : Hadii aad ku hadasho Soomaali, waaxda luqadaha, qaybta kaalmada adeegyada, waxay idiin hayaan adeeg tim laana rod. So [facility] [phone].    ATENCIÓN: Si habla español, tiene a leon disposición servicios gratuitos de asistencia lingüística. [facility] [phone].    We comply with applicable federal civil rights laws and [place] laws. We do not discriminate on the basis of race, color, national origin, age, disability, sex, sexual orientation, or gender identity.            Thank you!     Thank you for choosing [facility]  for your care. Our goal is always to provide you with excellent care. Hearing back from our patients is one way we can continue to improve our services. Please take a few minutes to complete the written survey that you may receive in the mail after your visit with us. Thank you!             Your Updated Medication List - Protect others around you: Learn how to safely use, store and throw away your medicines at www.disposemymeds.org.          This list is accurate as of 5/16/18  7:01 AM.  Always use your most recent med list.                   Brand Name Dispense Instructions for use Diagnosis    acetaminophen 650 MG 8 hour tablet     100 tablet    Take 650 mg by mouth every 6 hours    S/P AVR       allopurinol 100 MG tablet    ZYLOPRIM    180 tablet    Take 1 tablet (100 mg) by mouth 2 times daily    Acute gouty arthritis       apixaban ANTICOAGULANT 5 MG tablet    ELIQUIS    180 tablet    Take 1 tablet (5 mg) by mouth 2 times daily    S/P AVR       aspirin 81 MG EC tablet      Take 1 tablet (81 mg) by mouth daily    S/P AVR       atorvastatin 40 MG tablet    LIPITOR    90  tablet    Take 1 tablet (40 mg) by mouth daily    Hyperlipidemia LDL goal <100       diltiazem 120 MG 24 hr capsule    DILACOR XR    90 capsule    Take 1 capsule (120 mg) by mouth daily    S/P AVR       finasteride 5 MG tablet    PROSCAR    90 tablet    Take 1 tablet (5 mg) by mouth daily        FLUoxetine 20 MG capsule    PROzac    270 capsule    Take 3 capsules (60 mg) by mouth daily    S/P AVR       HYDROcodone-acetaminophen 5-325 MG per tablet    NORCO    12 tablet    Take 1 tablet by mouth every 4 hours as needed for pain maximum 10 tablet(s) per day        metoprolol tartrate 50 MG tablet    LOPRESSOR    180 tablet    Take 1 tablet (50 mg) by mouth 2 times daily    S/P AVR       mirtazapine 7.5 MG Tabs tablet    REMERON    90 tablet    Take 1 tablet (7.5 mg) by mouth At Bedtime    S/P AVR       nitroGLYcerin 0.4 MG sublingual tablet    NITROSTAT    25 tablet    Place 1 tablet (0.4 mg) under the tongue every 5 minutes as needed for chest pain    Coronary artery disease involving native coronary artery of native heart without angina pectoris       polyethylene glycol Packet    MIRALAX/GLYCOLAX    7 packet    Take 17 g by mouth daily    S/P AVR

## 2018-05-17 ENCOUNTER — RADIANT APPOINTMENT (OUTPATIENT)
Dept: GENERAL RADIOLOGY | Facility: CLINIC | Age: 78
End: 2018-05-17
Attending: ORTHOPAEDIC SURGERY
Payer: MEDICARE

## 2018-05-17 ENCOUNTER — OFFICE VISIT (OUTPATIENT)
Dept: ORTHOPEDICS | Facility: CLINIC | Age: 78
End: 2018-05-17
Payer: MEDICARE

## 2018-05-17 VITALS
HEIGHT: 69 IN | DIASTOLIC BLOOD PRESSURE: 74 MMHG | BODY MASS INDEX: 36.54 KG/M2 | WEIGHT: 246.7 LBS | SYSTOLIC BLOOD PRESSURE: 120 MMHG | TEMPERATURE: 97.7 F

## 2018-05-17 DIAGNOSIS — M25.552 HIP PAIN, LEFT: ICD-10-CM

## 2018-05-17 DIAGNOSIS — M54.16 LUMBAR RADICULOPATHY: ICD-10-CM

## 2018-05-17 DIAGNOSIS — M53.3 PAIN OF LEFT SACROILIAC JOINT: Primary | ICD-10-CM

## 2018-05-17 PROCEDURE — 99203 OFFICE O/P NEW LOW 30 MIN: CPT | Performed by: ORTHOPAEDIC SURGERY

## 2018-05-17 PROCEDURE — 73502 X-RAY EXAM HIP UNI 2-3 VIEWS: CPT | Mod: TC

## 2018-05-17 ASSESSMENT — PAIN SCALES - GENERAL: PAINLEVEL: SEVERE PAIN (7)

## 2018-05-17 ASSESSMENT — PATIENT HEALTH QUESTIONNAIRE - PHQ9: SUM OF ALL RESPONSES TO PHQ QUESTIONS 1-9: 9

## 2018-05-17 NOTE — PROGRESS NOTES
Appropriate assistive devices provided during their visit. yes (Yes, No, N/A) walker (list device)    Exam table and/or cart  placed in the lowest position. yes (Yes, No, N/A)    Brakes on tables/carts/wheelchairs used at all times. yes (Yes, No, N/A)    Non slip footwear applied. No shoes on (Yes, No, NA)    Patient was accompanied by staff throughout visit. yes (Yes, No, N/A)    Equipment safety straps used. na (Yes, No, N/A)    Assist with toileting. na (Yes, No, N/A)

## 2018-05-17 NOTE — LETTER
5/17/2018         RE: Endy Navarro  [address] [address]        Dear Colleague,    Thank you for referring your patient, Endy Navarro, to the [facility]. Please see a copy of my visit note below.    ORTHOPEDIC CONSULT      Chief Complaint: Endy Navarro is a 77 year old male who is being seen for Chief Complaint   Patient presents with     Musculoskeletal Problem     left hip pain     Consult     ref: Dr. Alvarez       History of Present Illness:   Endy Navarro is a 77 year old male who is seen in consultation at the request of Dr. Alvarez for evaluation of left hip pain.  Mechanism of Injury:  No specific injury or incident states long hx of lower back pain/buttock pain into lateral hip. Getting worse over the past year or so. Also notes shooting pains from the back into the foot.  At times bilateral. States has had 3 previous steroid injection into spine/center of back 1 and 2nd helped, 3 did not.  He describes the pain as sharp deep, radiating pain from lower buttock to lateral thigh then into foot. Also notes feels his quad muscles are weak and painful.  Denies any groin pain. Occasionally bilateral, most often left side. Steps, laying in bed, sleep most painful activities. Also sitting for extended period and sit to stand.  Takes tylenol without benefit, occasional Norco with limited benefit. On Eliquis for afib, cannot take NSAIDs.  No previous SI or hip injections.  Also notes has hx of NPH with a VPS placement in [place]. Has not had the VPS valve checked.  PCP is arranging for patient to see/establish with neurosurgeon in [place].  Also notes has fallen 6 times in the last 10 days. Mostly at night.  Unsure of why.  Being worked up by PCP.  Also seeing home health PT once for what sounds like a safety eval. Is using a walker.  Is not seeing any outpatient PT. Per family has been told this pain is from the back, not seeing anyone from spine.  They are  here to make sure it is not his hip.       Patient's past medical, surgical, social and family histories reviewed.      Past Medical History:   Diagnosis Date     Anxiety state, unspecified      Atrial fibrillation (H)      Bell's palsy 12/12/1997     Bioprosthetic aortic valve replacement during current hospitalization      Coronary atherosclerosis of unspecified type of vessel, native or graft     coronary artery disease with history of MI and stent placement      Gout, unspecified      Hydrocephalus 2015     Old myocardial infarction 3/1998    Hx of MI     Osteoarthrosis, unspecified whether generalized or localized, unspecified site     Diffuse migratory arthritis     Other and unspecified hyperlipidemia      Paroxysmal supraventricular tachycardia (H)     Hx of PAT     Pure hypercholesterolemia      Stented coronary artery        Past Surgical History:   Procedure Laterality Date     C EXPLORATORY OF ABDOMEN  1/25/2007    Exploratory laparotomy.  Lysis of adhesions with reduction of internal hernia.     C ROTATOR CUFF STRAP      s/p rotator cuff surgery     COLONOSCOPY N/A 12/5/2016    Procedure: COMBINED COLONOSCOPY, SINGLE OR MULTIPLE BIOPSY/POLYPECTOMY BY BIOPSY;  Surgeon: Benjamin Cavazos MD;  Location:  GI     HC COLONOSCOPY THRU STOMA, DIAGNOSTIC  8/23/2004     HC KNEE SCOPE,MED/LAT MENISCUS REPAIR       HC REMOVE TONSILS/ADENOIDS,<11 Y/O      unsure of age     Hydrocephalus  2015    shunt placed     INJECT EPIDURAL LUMBAR N/A 6/8/2017    Procedure: INJECT EPIDURAL LUMBAR;  lumbar epidural steroid injection Left Lumbar 5 to sacral 1;  Surgeon: Pawan Davenport MD;  Location: [facility] OR     REMOVAL OF SPERM DUCT(S)      Vasectomy     REPLACE VALVE AORTIC N/A 9/14/2015    Procedure: REPLACE VALVE AORTIC;  Surgeon: Sang Chan MD;  Location:  OR       Medications:    Current Outpatient Prescriptions on File Prior to Visit:  acetaminophen 650 MG TABS Take 650 mg by mouth every 6 hours    allopurinol (ZYLOPRIM) 100 MG tablet Take 1 tablet (100 mg) by mouth 2 times daily   apixaban ANTICOAGULANT (ELIQUIS) 5 MG tablet Take 1 tablet (5 mg) by mouth 2 times daily   aspirin EC 81 MG EC tablet Take 1 tablet (81 mg) by mouth daily   atorvastatin (LIPITOR) 40 MG tablet Take 1 tablet (40 mg) by mouth daily   diltiazem (DILACOR XR) 120 MG 24 hr capsule Take 1 capsule (120 mg) by mouth daily   finasteride (PROSCAR) 5 MG tablet Take 1 tablet (5 mg) by mouth daily   FLUoxetine (PROZAC) 20 MG capsule Take 3 capsules (60 mg) by mouth daily   HYDROcodone-acetaminophen (NORCO) 5-325 MG per tablet Take 1 tablet by mouth every 4 hours as needed for pain maximum 10 tablet(s) per day   metoprolol (LOPRESSOR) 50 MG tablet Take 1 tablet (50 mg) by mouth 2 times daily   polyethylene glycol (MIRALAX/GLYCOLAX) packet Take 17 g by mouth daily   furosemide (LASIX) 20 MG tablet Take 1 tablet (20 mg) by mouth daily   nitroglycerin (NITROSTAT) 0.4 MG SL tablet Place 1 tablet (0.4 mg) under the tongue every 5 minutes as needed for chest pain     No current facility-administered medications on file prior to visit.     Allergies   Allergen Reactions     Seroquel [Quetiapine] Other (See Comments)     Felt funny       Social History     Occupational History     Laundry Mat      Social History Main Topics     Smoking status: Former Smoker     Smokeless tobacco: Never Used      Comment: quit 25 yr ago     Alcohol use 1.8 - 2.4 oz/week     3 - 4 Standard drinks or equivalent per week      Comment: Occ     Drug use: No     Sexual activity: Yes     Partners: Female       Family History   Problem Relation Age of Onset     CEREBROVASCULAR DISEASE Mother      CEREBROVASCULAR DISEASE Father      Hypertension Father      C.A.D. Sister      C.A.D. Brother      DIABETES Maternal Aunt      Prostate Cancer Brother      Cancer - colorectal No family hx of        REVIEW OF SYSTEMS  10 point review systems performed otherwise negative as noted as per  "history of present illness.    Physical Exam:  Vitals: /74 (BP Location: Right arm, Patient Position: Chair, Cuff Size: Adult Large)  Temp 97.7  F (36.5  C) (Temporal)  Ht 1.753 m (5' 9\")  Wt 111.9 kg (246 lb 11.2 oz)  BMI 36.43 kg/m2  BMI= Body mass index is 36.43 kg/(m^2).  Constitutional: healthy, alert and no acute distress   Psychiatric: mentation appears normal and affect normal/bright  NEURO: no focal deficits  RESP: Normal with easy respirations and no use of accessory muscles to breathe, no audible wheezing or retractions  CV: LLE:  Non=pitting edema, taught skin to ankle         Irregular rate by palpation  SKIN: No erythema, rashes, excoriation, or breakdown. No evidence of infection.   JOINT/EXTREMITIES:left hip: no deformity.  No bruising. Tender to SI joint. No other focal tenderness. No greater trochanteric bursitis.  Decreased hip flexion compared to right, hip flexion recreated lower buttock/lateral thigh pain.  Internal/external rotation recreates pain.  BHAVNA recreates pain.  Straight leg raise caused tightness to hamstring area slight pain into buttock.  No pain into Groin with ROM.  Left hip flexion 4+/5 compared to 5-/5 on right.     Lymph: no appreciated RLE lymphedema  GAIT: with walker, hunched over gait.    Diagnostic Modalities:  left hip X-ray: No fracture, dislocation and or lesion. Normal alignment.  Joint space maintained no significant arthritis. No appreciable soft tissue abnormality.   Independent visualization of the images was performed.      Impression: bilateral Lumbar radiculopathy  Left SI joint pain    Plan:  All of the above pertinent physical exam and imaging modalities findings was reviewed with Endy and his spouse.  With exam, history and imaging this is likely due to lumbar radiculopathy and a sacroiliitis. Reassured patient and spouse that there are no fractures seen on imaging, no significant arthritis, and his history and exam are more indicative for back " issues.  He is taking a blood thinner and would not recommend SI joint injection at this time, discussed options.  Recommend he continue to get set up with neurosurgery for NPH/VPS check, also recommended physical therapy for gait and back.  If this does not improve symptoms, or if symptoms get worse then recommend he be seen by spine.       I recommend conservative care for the patient to include formal physical therapy. Today I provided or dispensed physical therapy.    Return to clinic PRN, or sooner as needed for changes.  Re-x-ray on return: No    Scribed by:  Wei Medrano, APRN, CNP, DNP  11:46 AM  5/17/2018  I attest I have seen and evaluated the patient.  I agree with above impression and plan.       Vincent Leija D.O.    Again, thank you for allowing me to participate in the care of your patient.        Sincerely,        John Leija, DO

## 2018-05-17 NOTE — MR AVS SNAPSHOT
"              After Visit Summary   5/17/2018    Endy Navarro    MRN: [id number]           Patient Information     Date Of Birth          1940        Visit Information        Provider Department      5/17/2018 9:30 AM John Leija,  [facility] [place]        Today's Diagnoses     Pain of left sacroiliac joint    -  1    Lumbar radiculopathy           Follow-ups after your visit        Additional Services     PHYSICAL THERAPY REFERRAL       *This therapy referral will be filtered to a centralized scheduling office at [facility] and the patient will receive a call to schedule an appointment at a [place] location most convenient for them. *     [facility] provides Physical Therapy evaluation and treatment and many specialty services across the [place] system.  If requesting a specialty program, please choose from the list below.    If you have not heard from the scheduling office within 2 business days, please call [phone] for all locations, with the exception of [place], please call [phone] and [facility], please call [phone]  Treatment: Evaluation & Treatment  Special Instructions/Modalities: SI joint, back Core,Hamstring, Quad,Hip flexor. Gait training.  Start with flexibility and work towards endurance    Special Programs:     Please be aware that coverage of these services is subject to the terms and limitations of your health insurance plan.  Call member services at your health plan with any benefit or coverage questions.      **Note to Provider:  If you are referring outside of [facility] for the therapy appointment, please list the name of the location in the \"special instructions\" above, print the referral and give to the patient to schedule the appointment.                  Who to contact     If you have questions or need follow up information about today's clinic visit or your schedule please contact [facility] " "[place] directly at [phone].  Normal or non-critical lab and imaging results will be communicated to you by MyChart, letter or phone within 4 business days after the clinic has received the results. If you do not hear from us within 7 days, please contact the clinic through [employer] or phone. If you have a critical or abnormal lab result, we will notify you by phone as soon as possible.  Submit refill requests through [employer] or call your pharmacy and they will forward the refill request to us. Please allow 3 business days for your refill to be completed.          Additional Information About Your Visit        [employer] Information     [employer] gives you secure access to your electronic health record. If you see a primary care provider, you can also send messages to your care team and make appointments. If you have questions, please call your primary care clinic.  If you do not have a primary care provider, please call [phone] and they will assist you.        Care EveryWhere ID     This is your Care EveryWhere ID. This could be used by other organizations to access your [place] medical records  [id number]        Your Vitals Were     Temperature Height BMI (Body Mass Index)             97.7  F (36.5  C) (Temporal) 5' 9\" (1.753 m) 36.43 kg/m2          Blood Pressure from Last 3 Encounters:   05/17/18 120/74   05/16/18 148/84   05/05/18 149/90    Weight from Last 3 Encounters:   05/17/18 246 lb 11.2 oz (111.9 kg)   05/16/18 247 lb (112 kg)   05/05/18 230 lb (104.3 kg)              We Performed the Following     PHYSICAL THERAPY REFERRAL        Primary Care Provider Office Phone # Fax #    Richi Alvarez [phone]        [facility] [address]        Equal Access to Services     SHAUNNA VALENZUELA : Shawnee Carmona, aminta landin, matrinez young, kavya rod. So [facility] [phone].    ATENCIÓN: Si lia espdena, molly a leon " disposición servicios gratuitos de asistencia lingüística. Satnam fletcher [phone].    We comply with applicable federal civil rights laws and [place] laws. We do not discriminate on the basis of race, color, national origin, age, disability, sex, sexual orientation, or gender identity.            Thank you!     Thank you for choosing [facility]  for your care. Our goal is always to provide you with excellent care. Hearing back from our patients is one way we can continue to improve our services. Please take a few minutes to complete the written survey that you may receive in the mail after your visit with us. Thank you!             Your Updated Medication List - Protect others around you: Learn how to safely use, store and throw away your medicines at www.disposemymeds.org.          This list is accurate as of 5/17/18 11:58 AM.  Always use your most recent med list.                   Brand Name Dispense Instructions for use Diagnosis    acetaminophen 650 MG 8 hour tablet     100 tablet    Take 650 mg by mouth every 6 hours    S/P AVR       allopurinol 100 MG tablet    ZYLOPRIM    180 tablet    Take 1 tablet (100 mg) by mouth 2 times daily    Acute gouty arthritis       apixaban ANTICOAGULANT 5 MG tablet    ELIQUIS    180 tablet    Take 1 tablet (5 mg) by mouth 2 times daily    S/P AVR       aspirin 81 MG EC tablet      Take 1 tablet (81 mg) by mouth daily    S/P AVR       atorvastatin 40 MG tablet    LIPITOR    90 tablet    Take 1 tablet (40 mg) by mouth daily    Hyperlipidemia LDL goal <100       diltiazem 120 MG 24 hr capsule    DILACOR XR    90 capsule    Take 1 capsule (120 mg) by mouth daily    S/P AVR       finasteride 5 MG tablet    PROSCAR    90 tablet    Take 1 tablet (5 mg) by mouth daily        FLUoxetine 20 MG capsule    PROzac    270 capsule    Take 3 capsules (60 mg) by mouth daily    S/P AVR       furosemide 20 MG tablet    LASIX    30 tablet    Take 1 tablet (20 mg) by mouth daily     Essential hypertension with goal blood pressure less than 140/90       HYDROcodone-acetaminophen 5-325 MG per tablet    NORCO    12 tablet    Take 1 tablet by mouth every 4 hours as needed for pain maximum 10 tablet(s) per day        metoprolol tartrate 50 MG tablet    LOPRESSOR    180 tablet    Take 1 tablet (50 mg) by mouth 2 times daily    S/P AVR       nitroGLYcerin 0.4 MG sublingual tablet    NITROSTAT    25 tablet    Place 1 tablet (0.4 mg) under the tongue every 5 minutes as needed for chest pain    Coronary artery disease involving native coronary artery of native heart without angina pectoris       polyethylene glycol Packet    MIRALAX/GLYCOLAX    7 packet    Take 17 g by mouth daily    S/P AVR

## 2018-05-17 NOTE — PROGRESS NOTES
ORTHOPEDIC CONSULT      Chief Complaint: Endy Navarro is a 77 year old male who is being seen for Chief Complaint   Patient presents with     Musculoskeletal Problem     left hip pain     Consult     ref: Dr. Alvarez       History of Present Illness:   Endy Navarro is a 77 year old male who is seen in consultation at the request of Dr. Alvarez for evaluation of left hip pain.  Mechanism of Injury:  No specific injury or incident states long hx of lower back pain/buttock pain into lateral hip. Getting worse over the past year or so. Also notes shooting pains from the back into the foot.  At times bilateral. States has had 3 previous steroid injection into spine/center of back 1 and 2nd helped, 3 did not.  He describes the pain as sharp deep, radiating pain from lower buttock to lateral thigh then into foot. Also notes feels his quad muscles are weak and painful.  Denies any groin pain. Occasionally bilateral, most often left side. Steps, laying in bed, sleep most painful activities. Also sitting for extended period and sit to stand.  Takes tylenol without benefit, occasional Norco with limited benefit. On Eliquis for afib, cannot take NSAIDs.  No previous SI or hip injections.  Also notes has hx of NPH with a VPS placement in [place]. Has not had the VPS valve checked.  PCP is arranging for patient to see/establish with neurosurgeon in [place].  Also notes has fallen 6 times in the last 10 days. Mostly at night.  Unsure of why.  Being worked up by PCP.  Also seeing home health PT once for what sounds like a safety eval. Is using a walker.  Is not seeing any outpatient PT. Per family has been told this pain is from the back, not seeing anyone from spine.  They are here to make sure it is not his hip.       Patient's past medical, surgical, social and family histories reviewed.      Past Medical History:   Diagnosis Date     Anxiety state, unspecified      Atrial fibrillation (H)      Bell's palsy 12/12/1997      Bioprosthetic aortic valve replacement during current hospitalization      Coronary atherosclerosis of unspecified type of vessel, native or graft     coronary artery disease with history of MI and stent placement      Gout, unspecified      Hydrocephalus 2015     Old myocardial infarction 3/1998    Hx of MI     Osteoarthrosis, unspecified whether generalized or localized, unspecified site     Diffuse migratory arthritis     Other and unspecified hyperlipidemia      Paroxysmal supraventricular tachycardia (H)     Hx of PAT     Pure hypercholesterolemia      Stented coronary artery        Past Surgical History:   Procedure Laterality Date     C EXPLORATORY OF ABDOMEN  1/25/2007    Exploratory laparotomy.  Lysis of adhesions with reduction of internal hernia.     C ROTATOR CUFF STRAP      s/p rotator cuff surgery     COLONOSCOPY N/A 12/5/2016    Procedure: COMBINED COLONOSCOPY, SINGLE OR MULTIPLE BIOPSY/POLYPECTOMY BY BIOPSY;  Surgeon: Benjamin Cavazos MD;  Location:  GI     HC COLONOSCOPY THRU STOMA, DIAGNOSTIC  8/23/2004     HC KNEE SCOPE,MED/LAT MENISCUS REPAIR       HC REMOVE TONSILS/ADENOIDS,<11 Y/O      unsure of age     Hydrocephalus  2015    shunt placed     INJECT EPIDURAL LUMBAR N/A 6/8/2017    Procedure: INJECT EPIDURAL LUMBAR;  lumbar epidural steroid injection Left Lumbar 5 to sacral 1;  Surgeon: Pawan Davenport MD;  Location: [facility] OR     REMOVAL OF SPERM DUCT(S)      Vasectomy     REPLACE VALVE AORTIC N/A 9/14/2015    Procedure: REPLACE VALVE AORTIC;  Surgeon: Sang Chan MD;  Location:  OR       Medications:    Current Outpatient Prescriptions on File Prior to Visit:  acetaminophen 650 MG TABS Take 650 mg by mouth every 6 hours   allopurinol (ZYLOPRIM) 100 MG tablet Take 1 tablet (100 mg) by mouth 2 times daily   apixaban ANTICOAGULANT (ELIQUIS) 5 MG tablet Take 1 tablet (5 mg) by mouth 2 times daily   aspirin EC 81 MG EC tablet Take 1 tablet (81 mg) by mouth daily   atorvastatin  "(LIPITOR) 40 MG tablet Take 1 tablet (40 mg) by mouth daily   diltiazem (DILACOR XR) 120 MG 24 hr capsule Take 1 capsule (120 mg) by mouth daily   finasteride (PROSCAR) 5 MG tablet Take 1 tablet (5 mg) by mouth daily   FLUoxetine (PROZAC) 20 MG capsule Take 3 capsules (60 mg) by mouth daily   HYDROcodone-acetaminophen (NORCO) 5-325 MG per tablet Take 1 tablet by mouth every 4 hours as needed for pain maximum 10 tablet(s) per day   metoprolol (LOPRESSOR) 50 MG tablet Take 1 tablet (50 mg) by mouth 2 times daily   polyethylene glycol (MIRALAX/GLYCOLAX) packet Take 17 g by mouth daily   furosemide (LASIX) 20 MG tablet Take 1 tablet (20 mg) by mouth daily   nitroglycerin (NITROSTAT) 0.4 MG SL tablet Place 1 tablet (0.4 mg) under the tongue every 5 minutes as needed for chest pain     No current facility-administered medications on file prior to visit.     Allergies   Allergen Reactions     Seroquel [Quetiapine] Other (See Comments)     Felt funny       Social History     Occupational History     Laundry Mat      Social History Main Topics     Smoking status: Former Smoker     Smokeless tobacco: Never Used      Comment: quit 25 yr ago     Alcohol use 1.8 - 2.4 oz/week     3 - 4 Standard drinks or equivalent per week      Comment: Occ     Drug use: No     Sexual activity: Yes     Partners: Female       Family History   Problem Relation Age of Onset     CEREBROVASCULAR DISEASE Mother      CEREBROVASCULAR DISEASE Father      Hypertension Father      C.A.D. Sister      C.A.D. Brother      DIABETES Maternal Aunt      Prostate Cancer Brother      Cancer - colorectal No family hx of        REVIEW OF SYSTEMS  10 point review systems performed otherwise negative as noted as per history of present illness.    Physical Exam:  Vitals: /74 (BP Location: Right arm, Patient Position: Chair, Cuff Size: Adult Large)  Temp 97.7  F (36.5  C) (Temporal)  Ht 1.753 m (5' 9\")  Wt 111.9 kg (246 lb 11.2 oz)  BMI 36.43 kg/m2  BMI= Body " mass index is 36.43 kg/(m^2).  Constitutional: healthy, alert and no acute distress   Psychiatric: mentation appears normal and affect normal/bright  NEURO: no focal deficits  RESP: Normal with easy respirations and no use of accessory muscles to breathe, no audible wheezing or retractions  CV: LLE:  Non=pitting edema, taught skin to ankle         Irregular rate by palpation  SKIN: No erythema, rashes, excoriation, or breakdown. No evidence of infection.   JOINT/EXTREMITIES:left hip: no deformity.  No bruising. Tender to SI joint. No other focal tenderness. No greater trochanteric bursitis.  Decreased hip flexion compared to right, hip flexion recreated lower buttock/lateral thigh pain.  Internal/external rotation recreates pain.  BHAVNA recreates pain.  Straight leg raise caused tightness to hamstring area slight pain into buttock.  No pain into Groin with ROM.  Left hip flexion 4+/5 compared to 5-/5 on right.     Lymph: no appreciated RLE lymphedema  GAIT: with walker, hunched over gait.    Diagnostic Modalities:  left hip X-ray: No fracture, dislocation and or lesion. Normal alignment.  Joint space maintained no significant arthritis. No appreciable soft tissue abnormality.   Independent visualization of the images was performed.      Impression: bilateral Lumbar radiculopathy  Left SI joint pain    Plan:  All of the above pertinent physical exam and imaging modalities findings was reviewed with Endy and his spouse.  With exam, history and imaging this is likely due to lumbar radiculopathy and a sacroiliitis. Reassured patient and spouse that there are no fractures seen on imaging, no significant arthritis, and his history and exam are more indicative for back issues.  He is taking a blood thinner and would not recommend SI joint injection at this time, discussed options.  Recommend he continue to get set up with neurosurgery for NPH/VPS check, also recommended physical therapy for gait and back.  If this does  not improve symptoms, or if symptoms get worse then recommend he be seen by spine.       I recommend conservative care for the patient to include formal physical therapy. Today I provided or dispensed physical therapy.    Return to clinic PRN, or sooner as needed for changes.  Re-x-ray on return: No    Scribed by:  MYKEL Foster, CNP, DNP  11:46 AM  5/17/2018  I attest I have seen and evaluated the patient.  I agree with above impression and plan.       Vincent Leija D.O.

## 2018-05-31 ENCOUNTER — APPOINTMENT (OUTPATIENT)
Dept: GENERAL RADIOLOGY | Facility: CLINIC | Age: 78
End: 2018-05-31
Attending: FAMILY MEDICINE
Payer: MEDICARE

## 2018-05-31 ENCOUNTER — APPOINTMENT (OUTPATIENT)
Dept: CT IMAGING | Facility: CLINIC | Age: 78
End: 2018-05-31
Attending: FAMILY MEDICINE
Payer: MEDICARE

## 2018-05-31 ENCOUNTER — HOSPITAL ENCOUNTER (EMERGENCY)
Facility: CLINIC | Age: 78
Discharge: HOME | End: 2018-05-31
Attending: FAMILY MEDICINE | Admitting: FAMILY MEDICINE
Payer: MEDICARE

## 2018-05-31 VITALS
WEIGHT: 247 LBS | TEMPERATURE: 96.2 F | BODY MASS INDEX: 36.48 KG/M2 | DIASTOLIC BLOOD PRESSURE: 97 MMHG | OXYGEN SATURATION: 90 % | SYSTOLIC BLOOD PRESSURE: 149 MMHG

## 2018-05-31 DIAGNOSIS — S70.02XA CONTUSION OF LEFT HIP, INITIAL ENCOUNTER: ICD-10-CM

## 2018-05-31 DIAGNOSIS — M54.16 LUMBAR RADICULOPATHY: ICD-10-CM
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
## 2018-05-31 DIAGNOSIS — S46.912A LEFT SHOULDER STRAIN, INITIAL ENCOUNTER: ICD-10-CM
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
## 2018-05-31 PROCEDURE — 25000132 ZZH RX MED GY IP 250 OP 250 PS 637: Mod: GY | Performed by: FAMILY MEDICINE

## 2018-05-31 PROCEDURE — 72192 CT PELVIS W/O DYE: CPT

## 2018-05-31 PROCEDURE — 99285 EMERGENCY DEPT VISIT HI MDM: CPT | Mod: 25 | Performed by: FAMILY MEDICINE

## 2018-05-31 PROCEDURE — 72131 CT LUMBAR SPINE W/O DYE: CPT

## 2018-05-31 PROCEDURE — 73030 X-RAY EXAM OF SHOULDER: CPT | Mod: TC,LT

## 2018-05-31 PROCEDURE — A9270 NON-COVERED ITEM OR SERVICE: HCPCS | Mod: GY | Performed by: FAMILY MEDICINE

## 2018-05-31 PROCEDURE — 99285 EMERGENCY DEPT VISIT HI MDM: CPT | Mod: Z6 | Performed by: FAMILY MEDICINE

## 2018-05-31 RX ORDER — HYDROCODONE BITARTRATE AND ACETAMINOPHEN 5; 325 MG/1; MG/1
1-2 TABLET ORAL ONCE
Status: COMPLETED | OUTPATIENT
Start: 2018-05-31 | End: 2018-05-31

## 2018-05-31 RX ORDER — HYDROCODONE BITARTRATE AND ACETAMINOPHEN 5; 325 MG/1; MG/1
1 TABLET ORAL ONCE
Status: COMPLETED | OUTPATIENT
Start: 2018-05-31 | End: 2018-05-31

## 2018-05-31 RX ORDER — HYDROCODONE BITARTRATE AND ACETAMINOPHEN 5; 325 MG/1; MG/1
1 TABLET ORAL EVERY 4 HOURS PRN
Qty: 12 TABLET | Refills: 0 | Status: SHIPPED | OUTPATIENT
Start: 2018-05-31 | End: 2018-11-16

## 2018-05-31 RX ADMIN — HYDROCODONE BITARTRATE AND ACETAMINOPHEN 1 TABLET: 5; 325 TABLET ORAL at 05:58

## 2018-05-31 RX ADMIN — HYDROCODONE BITARTRATE AND ACETAMINOPHEN 1 TABLET: 5; 325 TABLET ORAL at 04:56

## 2018-05-31 ASSESSMENT — ENCOUNTER SYMPTOMS
FEVER: 0
WEAKNESS: 1
NECK STIFFNESS: 0
CHEST TIGHTNESS: 0
DYSURIA: 0
WOUND: 0
NECK PAIN: 0
PALPITATIONS: 0
EYES NEGATIVE: 1
RESPIRATORY NEGATIVE: 1
SHORTNESS OF BREATH: 0
BACK PAIN: 0
HEADACHES: 0
JOINT SWELLING: 1
NUMBNESS: 0
LIGHT-HEADEDNESS: 0
PSYCHIATRIC NEGATIVE: 1
CHILLS: 0
COLOR CHANGE: 0
FACIAL ASYMMETRY: 0
DIZZINESS: 0
NAUSEA: 1
FLANK PAIN: 0

## 2018-05-31 NOTE — ED TRIAGE NOTES
EMS reports that pt was sleeping in EZ chair and went he tried to get out of chair, pt tripped and fell.  Pt c/o left hip and shoulder pain.

## 2018-05-31 NOTE — ED AVS SNAPSHOT
[facility] Emergency Department    [address] [place] [address] [place] [address]    Phone:  [phone]    Fax:  [phone]                                       Endy Navarro   MRN: [id number]    Department:  [facility] Emergency Department   Date of Visit:  5/31/2018           Patient Information     Date Of Birth          1940        Your diagnoses for this visit were:     Fall in home, initial encounter     Left shoulder strain, initial encounter     Contusion of left hip, initial encounter     Lumbar radiculopathy        You were seen by Delon Villa DO.      Follow-up Information     Follow up with Richi Alvarez MD.    Specialty:  Internal Medicine    Why:  As needed    Contact information:    [address] [place] [address]  [phone]          Follow up with John Leija DO.    Specialty:  Orthopedics    Why:  as planned on 6/4/18    Contact information:    [address] [place] [address]  [phone]          Follow up with [facility] Emergency Department.    Specialty:  EMERGENCY MEDICINE    Why:  If symptoms worsen    Contact information:    [address] [place] [address]   [place] [id number] [phone]    Additional information:    From [address]: Exit at [address] on [place] side of [place]. Turn right on [address] [employer] Turn left at stoplight on [address]. [facility] will be in view two blocks ahead        Discharge Instructions       Please read and follow the handout(s) instructions. Return, if needed, for increased or worsening symptoms and as directed by the handout(s).    Keep your scheduled appointment with Dr. Leija as planned on 6/4/18    You may apply ice or cold packs to the area for 10-15 minutes every 1-2 hours as needed to relieve pain and/or swelling.    Please use your walker when ambulating to prevent falls until rechecked in the Ortho clinic on the 6th.    Electronically signed,  Delon Villa DO          Discharge References/Attachments     FALLS, PREVENTING, ARE YOU AT RISK OF FALLING? (ENGLISH)    FALLS, PREVENTING, EXERCISES TO IMPROVE BALANCE, FLEXIBILITY, STRENGTH, AND STAYING POWER (ENGLISH)    FALLS, PREVENTING, MOVING SAFELY USING A CANE OR WALKER (ENGLISH)    HIP CONTUSION (ENGLISH)    MUSCLE STRAIN, EXTREMITY (ENGLISH)      Your next 10 appointments already scheduled     Jun 04, 2018 10:30 AM CDT   Ortho Eval with Matilda Graves, PT   [facility] ([facility])    [address] [place] [id number] [phone]              24 Hour Appointment Hotline       To make an appointment at any [facility] clinic, call [phone] ([phone]). If you don't have a family doctor or clinic, we will help you find one. [facility] clinics are conveniently located to serve the needs of you and your family.             Review of your medicines      Our records show that you are taking the medicines listed below. If these are incorrect, please call your family doctor or clinic.        Dose / Directions Last dose taken    acetaminophen 650 MG 8 hour tablet   Dose:  650 mg   Quantity:  100 tablet        Take 650 mg by mouth every 6 hours   Refills:  0        allopurinol 100 MG tablet   Commonly known as:  ZYLOPRIM   Dose:  100 mg   Quantity:  180 tablet        Take 1 tablet (100 mg) by mouth 2 times daily   Refills:  3        apixaban ANTICOAGULANT 5 MG tablet   Commonly known as:  ELIQUIS   Dose:  5 mg   Quantity:  180 tablet        Take 1 tablet (5 mg) by mouth 2 times daily   Refills:  3        aspirin 81 MG EC tablet   Dose:  81 mg        Take 1 tablet (81 mg) by mouth daily   Refills:  0        atorvastatin 40 MG tablet   Commonly known as:  LIPITOR   Dose:  40 mg   Quantity:  90 tablet        Take 1 tablet (40 mg) by mouth daily   Refills:  3        diltiazem 120 MG 24 hr capsule   Commonly known as:  DILACOR XR   Dose:  120 mg   Quantity:   90 capsule        Take 1 capsule (120 mg) by mouth daily   Refills:  3        finasteride 5 MG tablet   Commonly known as:  PROSCAR   Dose:  5 mg   Quantity:  90 tablet        Take 1 tablet (5 mg) by mouth daily   Refills:  3        FLUoxetine 20 MG capsule   Commonly known as:  PROzac   Dose:  60 mg   Quantity:  270 capsule        Take 3 capsules (60 mg) by mouth daily   Refills:  3        furosemide 20 MG tablet   Commonly known as:  LASIX   Dose:  20 mg   Quantity:  30 tablet        Take 1 tablet (20 mg) by mouth daily   Refills:  1        HYDROcodone-acetaminophen 5-325 MG per tablet   Commonly known as:  NORCO   Dose:  1 tablet   Quantity:  12 tablet        Take 1 tablet by mouth every 4 hours as needed for pain maximum 10 tablet(s) per day   Refills:  0        metoprolol tartrate 50 MG tablet   Commonly known as:  LOPRESSOR   Dose:  50 mg   Quantity:  180 tablet        Take 1 tablet (50 mg) by mouth 2 times daily   Refills:  3        nitroGLYcerin 0.4 MG sublingual tablet   Commonly known as:  NITROSTAT   Dose:  0.4 mg   Quantity:  25 tablet        Place 1 tablet (0.4 mg) under the tongue every 5 minutes as needed for chest pain   Refills:  4        polyethylene glycol Packet   Commonly known as:  MIRALAX/GLYCOLAX   Dose:  17 g   Quantity:  7 packet        Take 17 g by mouth daily   Refills:  0                Information about OPIOIDS     PRESCRIPTION OPIOIDS: WHAT YOU NEED TO KNOW   You have a prescription for an opioid (narcotic) pain medicine. Opioids can cause addiction. If you have a history of chemical dependency of any type, you are at a higher risk of becoming addicted to opioids. Only take this medicine after all other options have been tried. Take it for as short a time and as few doses as possible.     Do not:    Drive. If you drive while taking these medicines, you could be arrested for driving under the influence (DUI).    Operate heavy machinery    Do any other dangerous activities while taking  these medicines.     Drink any alcohol while taking these medicines.      Take with any other medicines that contain acetaminophen. Read all labels carefully. Look for the word  acetaminophen  or  Tylenol.  Ask your pharmacist if you have questions or are unsure.    Store your pills in a secure place, locked if possible. We will not replace any lost or stolen medicine. If you don t finish your medicine, please throw away (dispose) as directed by your pharmacist. The [place] [employer] has more information about safe disposal: https://www.pca.[facility].[place].us/living-green/managing-unwanted-medications    All opioids tend to cause constipation. Drink plenty of water and eat foods that have a lot of fiber, such as fruits, vegetables, prune juice, apple juice and high-fiber cereal. Take a laxative (Miralax, milk of magnesia, Colace, Senna) if you don t move your bowels at least every other day.         Prescriptions were sent or printed at these locations (1 Prescription)                   [facility] [address] [place], [place] - [address]   [address], [facility] [address]    Telephone:  [phone]   Fax:  [phone]   Hours:                  Printed at Department/Unit printer (1 of 1)         HYDROcodone-acetaminophen (NORCO) 5-325 MG per tablet                Procedures and tests performed during your visit     CT Pelvis w/o Contrast    Lumbar spine CT w/o contrast    XR Shoulder Left G/E 3 Views      Orders Needing Specimen Collection     None      Pending Results     No orders found from 5/29/2018 to 6/1/2018.            Pending Culture Results     No orders found from 5/29/2018 to 6/1/2018.            Pending Results Instructions     If you had any lab results that were not finalized at the time of your Discharge, you can call the ED Lab Result RN at [phone]. You will be contacted by this team for any positive Lab results or changes in treatment. The nurses are available 7 days a week from 10A to  6:30P.  You can leave a message 24 hours per day and they will return your call.        Thank you for choosing [facility]       Thank you for choosing [facility] for your care. Our goal is always to provide you with excellent care. Hearing back from our patients is one way we can continue to improve our services. Please take a few minutes to complete the written survey that you may receive in the mail after you visit with us. Thank you!        [employer] Information     [facility] gives you secure access to your electronic health record. If you see a primary care provider, you can also send messages to your care team and make appointments. If you have questions, please call your primary care clinic.  If you do not have a primary care provider, please call [phone] and they will assist you.        Care EveryWhere ID     This is your Care EveryWhere ID. This could be used by other organizations to access your [facility] medical records  [id number]        Equal Access to Services     SHAUNNA VALENZUELA : Shawnee Carmona, aminta landin, kavya parker . So [facility] [phone].    ATENCIÓN: Si habla español, tiene a leon disposición servicios gratuitos de asistencia lingüística. Llame al [phone].    We comply with applicable federal civil rights laws and [place] laws. We do not discriminate on the basis of race, color, national origin, age, disability, sex, sexual orientation, or gender identity.            After Visit Summary       This is your record. Keep this with you and show to your community pharmacist(s) and doctor(s) at your next visit.

## 2018-05-31 NOTE — ED PROVIDER NOTES
History     Chief Complaint   Patient presents with     Fall     HPI  Endy Navarro is a 77 year old male who resented to the emergency room via ambulance secondary to a fall in his home this morning.  Patient states that he was sleeping in an easy chair in his living room because of issues with pain to his lumbar spine and hips bilaterally.  He states that he got up to get out of his chair and go to the bathroom when he tripped causing him to fall and landed on his left side.  Complains of increased pain to his left hip and left shoulder since the fall.  Denied any head injury or neck injury associated with the fall.  He denies any chest or abdominal pain symptoms associated with the fall.  He states he has a history for prior injury to his left elbow in high school wrestling in that elbow is been dysfunctional since with inability to straighten it.  Patient states that he has had multiple injections to his low back to try to help with his back and hip pain issues however none of the injections have helped him much for pain control.  Patient admits to a history for chronic atrial fib and is on Eliquis to prevent strokes and clots associated with atrial fibrillation.  Not noticed significant bruising since the fall.  Occurred just prior to coming to the ER.    Primary Survey:    Airway: Intact, Patent and Talking    Breathing: Spontaneous, Bilateral breath sounds and Non labored    Circulation: Awake and alert with mildly elevated blood pressure and normal central and peripheral perfusion     Disability:     Pupils: EOMI PERRL      GCS:   Motor 6=Obeys commands   Verbal 5=Oriented   Eye Opening 4=Spontaneous   Total: 15                                     Patient without evidence for focal deficit or signs suggestive of stroke as reason for his fall.    Environment & Exposure: Patient was completely exposed and a head-to-toe visual inspection was done. and Patient was log-rolled to maintain spinal  immoblization.    Problem List:    Patient Active Problem List    Diagnosis Date Noted     Pain of left sacroiliac joint 05/17/2018     Priority: Medium     Lumbar radiculopathy 05/17/2018     Priority: Medium     Essential hypertension with goal blood pressure less than 140/90 11/11/2016     Priority: Medium     Major depressive disorder, recurrent episode, mild (H) 11/11/2016     Priority: Medium     Chronic atrial fibrillation (H) 11/11/2016     Priority: Medium     Hypertension goal BP (blood pressure) < 140/90 12/11/2015     Priority: Medium     S/P AVR (aortic valve replacement) 10/05/2015     Priority: Medium     Transient hyperglycemia post procedure 10/05/2015     Priority: Medium     Anemia due to blood loss, acute 10/05/2015     Priority: Medium     Delirium 10/05/2015     Priority: Medium     Mild major depression (H) 10/15/2012     Priority: Medium     Advanced directives, counseling/discussion 05/15/2012     Priority: Medium     Patient states has Advance Directive and will bring in a copy to clinic. 5/15/2012 [facility]         HYPERLIPIDEMIA LDL GOAL <100 10/31/2010     Priority: Medium     Gout      Priority: Medium     Problem list name updated by automated process. Provider to review       Chronic ischemic heart disease 04/19/2005     Priority: Medium     Problem list name updated by automated process. Provider to review          Past Medical History:    Past Medical History:   Diagnosis Date     Anxiety state, unspecified      Atrial fibrillation (H)      Bell's palsy 12/12/1997     Bioprosthetic aortic valve replacement during current hospitalization      Coronary atherosclerosis of unspecified type of vessel, native or graft      Gout, unspecified      Hydrocephalus 2015     Old myocardial infarction 3/1998     Osteoarthrosis, unspecified whether generalized or localized, unspecified site      Other and unspecified hyperlipidemia      Paroxysmal supraventricular tachycardia (H)      Pure  hypercholesterolemia      Stented coronary artery        Past Surgical History:    Past Surgical History:   Procedure Laterality Date     C EXPLORATORY OF ABDOMEN  1/25/2007    Exploratory laparotomy.  Lysis of adhesions with reduction of internal hernia.     C ROTATOR CUFF STRAP      s/p rotator cuff surgery     COLONOSCOPY N/A 12/5/2016    Procedure: COMBINED COLONOSCOPY, SINGLE OR MULTIPLE BIOPSY/POLYPECTOMY BY BIOPSY;  Surgeon: Benjamin Cavazos MD;  Location: [facility]     HC COLONOSCOPY THRU STOMA, DIAGNOSTIC  8/23/2004     HC KNEE SCOPE,MED/LAT MENISCUS REPAIR       HC REMOVE TONSILS/ADENOIDS,<11 Y/O      unsure of age     Hydrocephalus  2015    shunt placed     INJECT EPIDURAL LUMBAR N/A 6/8/2017    Procedure: INJECT EPIDURAL LUMBAR;  lumbar epidural steroid injection Left Lumbar 5 to sacral 1;  Surgeon: Pawan Davenport MD;  Location: [facility]     REMOVAL OF SPERM DUCT(S)      Vasectomy     REPLACE VALVE AORTIC N/A 9/14/2015    Procedure: REPLACE VALVE AORTIC;  Surgeon: Sang Chan MD;  Location:  OR       Family History:    Family History   Problem Relation Age of Onset     CEREBROVASCULAR DISEASE Mother      CEREBROVASCULAR DISEASE Father      Hypertension Father      C.A.D. Sister      C.A.D. Brother      DIABETES Maternal Aunt      Prostate Cancer Brother      Cancer - colorectal No family hx of        Social History:  Marital Status:   [2]  Social History   Substance Use Topics     Smoking status: Former Smoker     Smokeless tobacco: Never Used      Comment: quit 25 yr ago     Alcohol use 1.8 - 2.4 oz/week     3 - 4 Standard drinks or equivalent per week      Comment: Occ        Medications:      acetaminophen 650 MG TABS   allopurinol (ZYLOPRIM) 100 MG tablet   apixaban ANTICOAGULANT (ELIQUIS) 5 MG tablet   aspirin EC 81 MG EC tablet   atorvastatin (LIPITOR) 40 MG tablet   diltiazem (DILACOR XR) 120 MG 24 hr capsule   finasteride (PROSCAR) 5 MG tablet   FLUoxetine (PROZAC) 20 MG capsule    furosemide (LASIX) 20 MG tablet   HYDROcodone-acetaminophen (NORCO) 5-325 MG per tablet   metoprolol (LOPRESSOR) 50 MG tablet   nitroglycerin (NITROSTAT) 0.4 MG SL tablet   polyethylene glycol (MIRALAX/GLYCOLAX) packet         Review of Systems   Constitutional: Negative for chills and fever.   HENT: Negative.    Eyes: Negative.    Respiratory: Negative.  Negative for chest tightness and shortness of breath.    Cardiovascular: Negative for chest pain and palpitations.   Gastrointestinal: Positive for nausea.   Genitourinary: Negative for dysuria and flank pain.   Musculoskeletal: Positive for joint swelling (Lateral hip pain left worse than the right.  Left shoulder pain worse since the fall tonight.). Negative for back pain, neck pain and neck stiffness.   Skin: Negative for color change, rash and wound.   Neurological: Positive for weakness (legs that he states is felt secondary to lumbar spine issues.). Negative for dizziness, facial asymmetry, light-headedness, numbness and headaches.   Psychiatric/Behavioral: Negative.    All other systems reviewed and are negative.      Physical Exam      Vitals:    05/31/18 0442   BP: (!) 160/93   Temp: 96.2  F (35.7  C)   TempSrc: Oral   SpO2: 94%   Weight: 112 kg (247 lb)         Physical Exam   Constitutional: He is oriented to person, place, and time. He appears well-developed and well-nourished. He appears distressed (left shoulder and bilateral hip pains).   HENT:   Head: Normocephalic and atraumatic.   Mouth/Throat: Oropharynx is clear and moist.   Eyes: Conjunctivae and EOM are normal. Pupils are equal, round, and reactive to light.   Neck: Normal range of motion. Neck supple.   Cardiovascular: Normal rate, normal heart sounds and intact distal pulses.    Pulmonary/Chest: Effort normal. No respiratory distress. He has no wheezes. He has no rales. He exhibits no tenderness.   Abdominal: Soft. There is no tenderness. There is no rebound and no guarding.    Musculoskeletal:        Left shoulder: He exhibits decreased range of motion, tenderness, bony tenderness and pain. He exhibits no swelling, no effusion, no crepitus, no deformity, no laceration, no spasm, normal pulse and normal strength.        Right hip: He exhibits tenderness and bony tenderness.        Left hip: He exhibits decreased range of motion (secondary to pain), tenderness and bony tenderness. He exhibits no crepitus and no deformity.        Arms:       Legs:  Neurological: He is alert and oriented to person, place, and time.   Skin: Skin is warm.   No open skin wound or abrasion identified on exam of the skin.   Psychiatric: He has a normal mood and affect. His behavior is normal. Judgment and thought content normal.   Nursing note and vitals reviewed.      ED Course     ED Course     Procedures               Critical Care time:  none               Results for orders placed or performed during the hospital encounter of 05/31/18 (from the past 24 hour(s))   Lumbar spine CT w/o contrast    Narrative    CT LUMBAR SPINE AND PELVIC BONE WITHOUT CONTRAST  5/31/2018 6:18 AM     HISTORY: Fall in home. Lumbar pain.    COMPARISON: 2/29/2016.    TECHNIQUE: Without intravenous contrast, helical sections were  acquired through the lumbar spine and also from the iliac crests  through the pubic symphysis. Coronal reconstructions were generated.  Radiation dose for this scan was reduced using automated exposure  control, adjustment of the mA and/or kV according to the patient's  size, or iterative reconstruction technique.    FINDINGS:  Lumbar spine: No visualized acute fracture or malalignment. Moderate  degenerative changes in the lumbar spine.    Pelvic bone: No visualized acute fracture or malalignment of the  pelvis or bilateral hips.    Additional findings: The visualized portions of the small and large  bowel are normal in caliber. No enlarged lymph nodes or free fluid in  the visualized portion of the  pelvis. Marked dilatation of the entire  left ureter and intrarenal collecting system with associated severe  left renal atrophy, also present on 2/29/2016. This is consistent with  a chronic left ureteropelvic junction obstruction. Atherosclerotic  calcification in the abdominal aorta.      Impression    IMPRESSION: No visualized acute fracture or malalignment of the lumbar  spine or pelvis.    ANAY MILES MD   CT Pelvis w/o Contrast    Narrative    CT LUMBAR SPINE AND PELVIC BONE WITHOUT CONTRAST  5/31/2018 6:18 AM     HISTORY: Fall in home. Lumbar pain.    COMPARISON: 2/29/2016.    TECHNIQUE: Without intravenous contrast, helical sections were  acquired through the lumbar spine and also from the iliac crests  through the pubic symphysis. Coronal reconstructions were generated.  Radiation dose for this scan was reduced using automated exposure  control, adjustment of the mA and/or kV according to the patient's  size, or iterative reconstruction technique.    FINDINGS:  Lumbar spine: No visualized acute fracture or malalignment. Moderate  degenerative changes in the lumbar spine.    Pelvic bone: No visualized acute fracture or malalignment of the  pelvis or bilateral hips.    Additional findings: The visualized portions of the small and large  bowel are normal in caliber. No enlarged lymph nodes or free fluid in  the visualized portion of the pelvis. Marked dilatation of the entire  left ureter and intrarenal collecting system with associated severe  left renal atrophy, also present on 2/29/2016. This is consistent with  a chronic left ureteropelvic junction obstruction. Atherosclerotic  calcification in the abdominal aorta.      Impression    IMPRESSION: No visualized acute fracture or malalignment of the lumbar  spine or pelvis.    ANAY MILES MD   XR Shoulder Left G/E 3 Views    Narrative    LEFT SHOULDER 3 VIEWS  5/31/2018 6:36 AM     HISTORY: Fall in home. Pain.    COMPARISON: 10/6/2009.       Impression    IMPRESSION:  1. No visualized acute fracture or malalignment of the left shoulder.  2. Moderate degenerative changes in the left glenohumeral joint.  3. Short distance from the acromion process to the humeral head,  suggestive of a chronic rotator cuff tear.    ANAY MILES MD       Medications   HYDROcodone-acetaminophen (NORCO) 5-325 MG per tablet 1-2 tablet (1 tablet Oral Given 5/31/18 1694)   HYDROcodone-acetaminophen (NORCO) 5-325 MG per tablet 1 tablet (1 tablet Oral Given 5/31/18 4005)       Assessments & Plan (with Medical Decision Making)  77-year-old male to the ER via ambulance secondary to a fall in his home.  Patient states he was sleeping in the recliner chair in the living room when he needed to get up to go the bathroom and ended up tripping losing his balance and falling onto the carpeted floor.  He states he may have laid there for about an hour before he can get his wife to wake up.  When she awoke she called 911 because she was unable to help him get upright.  His examination here revealed evidence for tenderness to the left shoulder without evidence for fracture.  He also had some tenderness to the left hip laterally but without evidence for significant bruising or ecchymosis or signs of fracture with CT scan examination of the pelvis and lumbar spine area.  Patient admits to having neuropathy of his lower legs making ambulation and balance somewhat difficult for him.  He is using a walker but admits that he probably does not use it as often as he should be.  He is encouraged to use his walker at all times until he is rechecked in the orthotic clinic on 6/4/2018 with his previously scheduled appointment to evaluate his lumbar area and lumbar radicular symptoms.  Patient was able to ambulate in the ER without significant discomfort and desired to return home with his family.  I feel that he is appropriate for discharge at this time.  I did print out handouts on how to avoid falls  in his home and how to rearrange his home to prevent falls.  He was also given instructions about strain to the left shoulder and contusion to the left lateral hip area.     I have reviewed the nursing notes.    I have reviewed the findings, diagnosis, plan and need for follow up with the patient.         Final diagnoses:   Fall in home, initial encounter   Left shoulder strain, initial encounter   Contusion of left hip, initial encounter   Lumbar radiculopathy       5/31/2018   [facility] EMERGENCY DEPARTMENT     Delon Villa DO  05/31/18 0741

## 2018-05-31 NOTE — DISCHARGE INSTRUCTIONS
Please read and follow the handout(s) instructions. Return, if needed, for increased or worsening symptoms and as directed by the handout(s).    Keep your scheduled appointment with Dr. Leija as planned on 6/4/18    You may apply ice or cold packs to the area for 10-15 minutes every 1-2 hours as needed to relieve pain and/or swelling.    Please use your walker when ambulating to prevent falls until rechecked in the Ortho clinic on the 6th.    Electronically signed, Delon Villa DO

## 2018-05-31 NOTE — ED AVS SNAPSHOT
[facility] Emergency Department    [address] [place] [address]    Phone:  [phone]    Fax:  [phone]                                       Endy Navarro   MRN: [id number]    Department:  [facility] Emergency Department   Date of Visit:  5/31/2018           After Visit Summary Signature Page     I have received my discharge instructions, and my questions have been answered. I have discussed any challenges I see with this plan with the nurse or doctor.    ..........................................................................................................................................  Patient/Patient Representative Signature      ..........................................................................................................................................  Patient Representative Print Name and Relationship to Patient    ..................................................               ................................................  Date                                            Time    ..........................................................................................................................................  Reviewed by Signature/Title    ...................................................              ..............................................  Date                                                            Time

## 2018-06-04 ENCOUNTER — HOSPITAL ENCOUNTER (OUTPATIENT)
Dept: PHYSICAL THERAPY | Facility: CLINIC | Age: 78
Setting detail: THERAPIES SERIES
End: 2018-06-04
Attending: NURSE PRACTITIONER
Payer: MEDICARE

## 2018-06-04 PROCEDURE — G8978 MOBILITY CURRENT STATUS: HCPCS | Mod: GP,CK | Performed by: PHYSICAL THERAPIST

## 2018-06-04 PROCEDURE — G8979 MOBILITY GOAL STATUS: HCPCS | Mod: GP,CI | Performed by: PHYSICAL THERAPIST

## 2018-06-04 PROCEDURE — 40000718 ZZHC STATISTIC PT DEPARTMENT ORTHO VISIT: Performed by: PHYSICAL THERAPIST

## 2018-06-04 PROCEDURE — 97162 PT EVAL MOD COMPLEX 30 MIN: CPT | Mod: GP | Performed by: PHYSICAL THERAPIST

## 2018-06-05 NOTE — PROGRESS NOTES
06/04/18 1035   General Information   Type of Visit Initial OP Ortho PT Evaluation   Start of Care Date 06/04/18   Referring Physician MYKEL Alonso CNP   Patient/Family Goals Statement Get back to walking.   Orders Evaluate and Treat   Orders Comment SI joint, back, core, hamstring, quad, hip flexor. Gait training.   Date of Order 05/17/18   Insurance Type Medicare;[employer]   Medical Diagnosis Pain of left sacroiliac joint; lumbar radiculopathy   Surgical/Medical history reviewed Yes   Precautions/Limitations fall precautions   Weight-Bearing Status - LUE full weight-bearing   Weight-Bearing Status - RUE full weight-bearing   Weight-Bearing Status - LLE full weight-bearing   Weight-Bearing Status - RLE full weight-bearing   General Information Comments PMH: Significant cardiac history; cranial shunt 3 years ago; multiple back injection; occasional knee pain; RTC repair 10 years ago on L side.       Present No   Body Part(s)   Body Part(s) Lumbar Spine/SI;Shoulder   Presentation and Etiology   Pertinent history of current problem (include personal factors and/or comorbidities that impact the POC) Pt has a history of back and B hip pain.  The past 2 years the pain has been increasing.  He has been through multiple injections to the back which has not been helping as much.  Pt does have significant cardiac history and a shunt placed in the R side of the head.  Pt reports having multiple falls the past year.  Just fell recently resulting in an ambulance ride to the ED for assessment.  Pt is having increasing L hip and L shoulder after this fall.  Back and hip pain increase with sitting, standing, sleeping, and walking.  Symptoms go into the top of the thighs and down to the feet.  Having increasing shoulder pain on L side due to fall.  Pain with reaching overhead.  Pt states with the back pain he has been sleeping in the recliner which is a lift chair.  This has helped him get in and  out of the chair easier.   Impairments A. Pain;D. Decreased ROM;F. Decreased strength and endurance;G. Impaired balance;H. Impaired gait   Functional Limitations perform activities of daily living   Symptom Location L > R low back, L > R LE, L shoulder   How/Where did it occur With a fall;From Degenerative Joint Disease   Onset date of current episode/exacerbation 06/05/16  (Approximately 2 years ago, recent fall was last week.)   Chronicity Chronic   Pain rating (0-10 point scale) Best (/10);Worst (/10)   Best (/10) 4/10   Worst (/10) 8/10   Pain quality C. Aching;B. Dull;A. Sharp   Frequency of pain/symptoms A. Constant   Pain/symptoms are: Worse during the day   Pain/symptoms exacerbated by A. Sitting;B. Walking;C. Lifting;D. Carrying;H. Overhead reach;G. Certain positions;J. ADL;K. Home tasks   Pain/symptoms eased by K. Other   Pain eased by comment Medications, reclining in chair, changing positions   Progression of symptoms since onset: Worsened   Prior Level of Function   Prior Level of Function-Mobility Independent   Prior Level of Function-ADLs Independent   Current Level of Function   Current Community Support Family/friend caregiver   Patient role/employment history F. Retired   Living environment House/townhome   Home/community accessibility Everything is on the main level.     Current equipment-Gait/Locomotion Front wheeled walker   Current equipment-ADL Sock aide;Long shoe horn;Shower/tub chair   Fall Risk Screen   Fall screen completed by PT   Have you fallen 2 or more times in the past year? Yes   Have you fallen and had an injury in the past year? Yes   Timed Up and Go score (seconds) 24.41   Is patient a fall risk? Yes;Department fall risk interventions implemented   System Outcome Measures   Outcome Measures Low Back Pain (see Oswestry and Lyric)   Lumbar Spine/SI Objective Findings   Integumentary Slight bruising on L hip.   Posture Kyphotic through thoracic spine, forward head   Gait/Locomotion  Narrow base of support to the point pt hits his heels when advancing the other foot forward.  Decreased heel to toe pattern, pt doesn't pick his feet up to clear the floor enough to heel strike.  Decreased stance phase on L with short stride and step length on R.  Avoids shifting full weight onto L LE.  When not using walker, no arm swing.  Slightly impulsive with turning.   Balance/Proprioception (Single Leg Stance) Unable to stand SL without UE support and for <5 seconds.  See flowsheet for FGA 6/30.   Flexion ROM 75% of normal motion, no reverse of lumbar curve.   Extension ROM 25% of normal motion   Right Side Bending ROM 50% of normal motion   Left Side Bending ROM 50% of normal motion   Lumbar ROM Comment L > R low back pain and into buttock with motions.   Hip Screen Negative for hip special testing.  Does get pain with knee to chest and FABERs into the low back.   Hip Flexion (L2) Strength 4+/5   Hip Extension Strength 4+/5   Knee Flexion Strength 4+/5   Knee Extension (L3) Strength 4+/5   Ankle Dorsiflexion (L4) Strength 4+/5   Great Toe Extension (L5) Strength 5/5   Ankle Plantar Flexion (S1) Strength 5/5   Hamstring Flexibility Decreased approximately 45 degrees B   Hip Flexor Flexibility Normal   Quadricep Flexibility Normal   Piriformis Flexibility Increased tone, pain   Palpation Will assess further next visit.  PT assessed balance and gait today due to safety issues.   Hip Abduction Strength 4+/5   Hip Adduction Strength 4+/5   Observation No acute distress.   Lumbar/SI Special Tests Comments Will assess next visit, PT assessed balance and gait today due to safety issues.   Shoulder Objective Findings   Side (if bilateral, select both right and left) Left   Observation No acute distress   Integumentary  Bruising to posterior arm.   Neer's Test Positive L   Zambrano-Ramón Test Positive L   Bursa Test Negative   Crossover Test Negative   Palpation Tenderness along the RTC insertion and bicep tendon  long head.   Left Shoulder Flexion AROM 90 degrees   Left Shoulder Flexion PROM 115 degrees   Left Shoulder Abduction AROM 110 degrees   Left Shoulder Flexion Strength 2/5   Left Shoulder Abduction Strength 2/5   Left Shoulder ER Strength 4/5   Left Shoulder IR Strength 5/5   Left Mid Trapezius Strength Difficulty activating.   Left Lower Trapezius Strength Difficulty activating.   Planned Therapy Interventions   Planned Therapy Interventions balance training;gait training;joint mobilization;manual therapy;neuromuscular re-education;ROM;strengthening;stretching;transfer training   Planned Therapy Interventions Comment Skilled services to address balance, gait, low back pain, and shoulder pain.   Planned Modality Interventions   Planned Modality Interventions Comments Modalities as needed for symptom management.   Clinical Impression   Criteria for Skilled Therapeutic Interventions Met evaluation only;treatment indicated   PT Diagnosis Impaired balance and gait, low back and L shoulder pain, radiating leg symptoms, all affecting ability to mobilize and safety with ambulation.   Influenced by the following impairments Pain, balance, weakness   Functional limitations due to impairments ADLs, IADLS, gait   Clinical Presentation Evolving/Changing   Clinical Presentation Rationale Pt is a 77 year old male with complaints of low back, L LE, and L shoulder pain.  Pt has been falling more frequently in the past year.  He has been using a walker at home the last 2 weeks for ambulation.  Pt has a significant medical history that will be a factor in progression of POC.  Pt will benefit from skilled PT services to address impairments and improve balance and gait to work on fall prevention.   Clinical Decision Making (Complexity) Moderate complexity   Therapy Frequency 2 times/Week   Predicted Duration of Therapy Intervention (days/wks) 8 weeks   Risk & Benefits of therapy have been explained Yes   Patient, Family & other staff  in agreement with plan of care Yes   Education Assessment   Preferred Learning Style Listening;Demonstration;Pictures/video   Barriers to Learning No barriers   ORTHO GOALS   PT Ortho Eval Goals 1;2;3;4   Ortho Goal 1   Goal Identifier #1   Goal Description Pt will be able to walk for 1/2 mile without increased back or leg pain using a walker/cane for safety.   Target Date 07/20/18   Ortho Goal 2   Goal Identifier #2   Goal Description Pt will demonstrate 4+/5 B LE motions in order to have the strength to negotiate steps and curbs.   Target Date 08/03/18   Ortho Goal 3   Goal Identifier #3   Goal Description Pt will demonstrate improved FGA score to >15/30 in order to demonstrate improved balance with ambulation and help with safety during weight bearing activities.   Target Date 08/03/18   Ortho Goal 4   Goal Identifier #4   Goal Description Pt will demonstrate <18 seconds on the TUG in order to demonstrate safety with walking on even surfaces.   Target Date 08/03/18   Total Evaluation Time   Total Evaluation Time 55   Therapy Certification   Certification date from 06/04/18   Certification date to 08/03/18   Medical Diagnosis Pain of left sacroiliac joint; lumbar radiculopathy     Thank you for your referral.    Matilda Graves, PT, DPT  [facility]  [phone]

## 2018-06-05 NOTE — PROGRESS NOTES
[facility]    OUTPATIENT PHYSICAL THERAPY ORTHOPEDIC EVALUATION  PLAN OF TREATMENT FOR OUTPATIENT REHABILITATION  (COMPLETE FOR INITIAL CLAIMS ONLY)  Patient's Last Name, First Name, M.I.  YOB: 1940  MelanieEndy  NELSON    Provider s Name:  [facility]   Medical Record No.  [id number]   Start of Care Date:  06/04/18   Onset Date:  06/05/16 (Approximately 2 years ago, recent fall was last week.)   Type:     _X__PT   ___OT   ___SLP Medical Diagnosis:  Pain of left sacroiliac joint; lumbar radiculopathy     PT Diagnosis:  Impaired balance and gait, low back and L shoulder pain, radiating leg symptoms, all affecting ability to mobilize and safety with ambulation.   Visits from SOC:  1      _________________________________________________________________________________  Plan of Treatment/Functional Goals:  balance training, gait training, joint mobilization, manual therapy, neuromuscular re-education, ROM, strengthening, stretching, transfer training  Skilled services to address balance, gait, low back pain, and shoulder pain.     Modalities as needed for symptom management.  Goals  Goal Identifier: #1  Goal Description: Pt will be able to walk for 1/2 mile without increased back or leg pain using a walker/cane for safety.  Target Date: 07/20/18    Goal Identifier: #2  Goal Description: Pt will demonstrate 4+/5 B LE motions in order to have the strength to negotiate steps and curbs.  Target Date: 08/03/18    Goal Identifier: #3  Goal Description: Pt will demonstrate improved FGA score to >15/30 in order to demonstrate improved balance with ambulation and help with safety during weight bearing activities.  Target Date: 08/03/18    Goal Identifier: #4  Goal Description: Pt will demonstrate <18 seconds on the TUG in order to demonstrate safety with walking on even surfaces.  Target Date: 08/03/18    Therapy Frequency:  2 times/Week  Predicted Duration of Therapy  Intervention:  8 weeks    Matilda Graves, TETO                 I CERTIFY THE NEED FOR THESE SERVICES FURNISHED UNDER        THIS PLAN OF TREATMENT AND WHILE UNDER MY CARE     (Physician co-signature of this document indicates review and certification of the therapy plan).                       Certification Date From:  06/04/18   Certification Date To:  08/03/18    Referring Provider:  MYKEL Alonso CNP    Initial Assessment        See Epic Evaluation Start of Care Date: 06/04/18             Thank you for your referral.    Matilda Graves PT, DPT  [facility]  [phone]

## 2018-06-12 ENCOUNTER — HOSPITAL ENCOUNTER (OUTPATIENT)
Dept: PHYSICAL THERAPY | Facility: CLINIC | Age: 78
Setting detail: THERAPIES SERIES
End: 2018-06-12
Attending: ORTHOPAEDIC SURGERY
Payer: MEDICARE

## 2018-06-12 PROCEDURE — 40000718 ZZHC STATISTIC PT DEPARTMENT ORTHO VISIT: Performed by: PHYSICAL THERAPIST

## 2018-06-12 PROCEDURE — 97110 THERAPEUTIC EXERCISES: CPT | Mod: GP | Performed by: PHYSICAL THERAPIST

## 2018-06-15 ENCOUNTER — HOSPITAL ENCOUNTER (OUTPATIENT)
Dept: PHYSICAL THERAPY | Facility: CLINIC | Age: 78
Setting detail: THERAPIES SERIES
End: 2018-06-15
Attending: ORTHOPAEDIC SURGERY
Payer: MEDICARE

## 2018-06-15 PROCEDURE — 40000718 ZZHC STATISTIC PT DEPARTMENT ORTHO VISIT: Performed by: PHYSICAL THERAPIST

## 2018-06-15 PROCEDURE — 97110 THERAPEUTIC EXERCISES: CPT | Mod: GP | Performed by: PHYSICAL THERAPIST

## 2018-06-15 PROCEDURE — 97140 MANUAL THERAPY 1/> REGIONS: CPT | Mod: GP | Performed by: PHYSICAL THERAPIST

## 2018-06-19 ENCOUNTER — HOSPITAL ENCOUNTER (OUTPATIENT)
Dept: PHYSICAL THERAPY | Facility: CLINIC | Age: 78
Setting detail: THERAPIES SERIES
End: 2018-06-19
Attending: ORTHOPAEDIC SURGERY
Payer: MEDICARE

## 2018-06-19 PROCEDURE — 97110 THERAPEUTIC EXERCISES: CPT | Mod: GP | Performed by: PHYSICAL THERAPIST

## 2018-06-19 PROCEDURE — 40000718 ZZHC STATISTIC PT DEPARTMENT ORTHO VISIT: Performed by: PHYSICAL THERAPIST

## 2018-06-19 PROCEDURE — 97140 MANUAL THERAPY 1/> REGIONS: CPT | Mod: GP | Performed by: PHYSICAL THERAPIST

## 2018-06-20 ENCOUNTER — OFFICE VISIT (OUTPATIENT)
Dept: INTERNAL MEDICINE | Facility: CLINIC | Age: 78
End: 2018-06-20
Payer: MEDICARE

## 2018-06-20 VITALS
DIASTOLIC BLOOD PRESSURE: 84 MMHG | TEMPERATURE: 97.1 F | BODY MASS INDEX: 35.59 KG/M2 | WEIGHT: 241 LBS | SYSTOLIC BLOOD PRESSURE: 136 MMHG | OXYGEN SATURATION: 99 % | RESPIRATION RATE: 16 BRPM | HEART RATE: 84 BPM

## 2018-06-20 DIAGNOSIS — M54.16 LUMBAR RADICULOPATHY: Primary | ICD-10-CM

## 2018-06-20 DIAGNOSIS — S49.92XS SHOULDER INJURY, LEFT, SEQUELA: ICD-10-CM

## 2018-06-20 DIAGNOSIS — Z95.2 S/P AVR (AORTIC VALVE REPLACEMENT): ICD-10-CM

## 2018-06-20 DIAGNOSIS — M53.3 PAIN OF LEFT SACROILIAC JOINT: ICD-10-CM

## 2018-06-20 DIAGNOSIS — I10 ESSENTIAL HYPERTENSION WITH GOAL BLOOD PRESSURE LESS THAN 140/90: ICD-10-CM

## 2018-06-20 DIAGNOSIS — R29.6 FALLS FREQUENTLY: ICD-10-CM

## 2018-06-20 DIAGNOSIS — L98.9 SKIN LESION: ICD-10-CM

## 2018-06-20 PROCEDURE — 99214 OFFICE O/P EST MOD 30 MIN: CPT | Performed by: INTERNAL MEDICINE

## 2018-06-20 RX ORDER — FUROSEMIDE 20 MG
20 TABLET ORAL DAILY
Qty: 90 TABLET | Refills: 1 | Status: SHIPPED | OUTPATIENT
Start: 2018-06-20 | End: 2018-11-16

## 2018-06-20 ASSESSMENT — PAIN SCALES - GENERAL: PAINLEVEL: SEVERE PAIN (6)

## 2018-06-20 NOTE — PROGRESS NOTES
SUBJECTIVE:   Endy Navarro is a 77 year old male who presents to clinic today for the following health issues:      ED/UC Followup:    Facility:  [facility]  Date of visit: 5/31/18  Reason for visit: Fall  Current Status: Follow up       Been falling lots lately, chronic back pains and other falls. Sleeps in his chair.      In May stopped the mirtazapine and only fell once since then, sleeping ok.      Pain isn't his hip but back pains.      PT for his back and walking but had a fall and injury to the left shoulder, does PT for that as well.     Tomorrow sees neurology here. Has had three shots in his lower back and first worked, others didn't.    Past Medical History:   Diagnosis Date     Anxiety state, unspecified      Atrial fibrillation (H)      Bell's palsy 12/12/1997     Bioprosthetic aortic valve replacement during current hospitalization      Coronary atherosclerosis of unspecified type of vessel, native or graft     coronary artery disease with history of MI and stent placement      Gout, unspecified      Hydrocephalus 2015     Old myocardial infarction 3/1998    Hx of MI     Osteoarthrosis, unspecified whether generalized or localized, unspecified site     Diffuse migratory arthritis     Other and unspecified hyperlipidemia      Paroxysmal supraventricular tachycardia (H)     Hx of PAT     Pure hypercholesterolemia      Stented coronary artery      Current Outpatient Prescriptions   Medication     acetaminophen 650 MG TABS     allopurinol (ZYLOPRIM) 100 MG tablet     apixaban ANTICOAGULANT (ELIQUIS) 5 MG tablet     aspirin EC 81 MG EC tablet     atorvastatin (LIPITOR) 40 MG tablet     diltiazem (DILACOR XR) 120 MG 24 hr capsule     finasteride (PROSCAR) 5 MG tablet     FLUoxetine (PROZAC) 20 MG capsule     furosemide (LASIX) 20 MG tablet     HYDROcodone-acetaminophen (NORCO) 5-325 MG per tablet     metoprolol (LOPRESSOR) 50 MG tablet     polyethylene glycol (MIRALAX/GLYCOLAX) packet      nitroglycerin (NITROSTAT) 0.4 MG SL tablet     [DISCONTINUED] furosemide (LASIX) 20 MG tablet     No current facility-administered medications for this visit.      Social History   Substance Use Topics     Smoking status: Former Smoker     Smokeless tobacco: Never Used      Comment: quit 25 yr ago     Alcohol use 1.8 - 2.4 oz/week     3 - 4 Standard drinks or equivalent per week      Comment: Occ     Review of Systems  Constitutional-No fevers, chills, or weight changes..  ENT-No earpain, sore throat, voice changes or rhinitis.  Cardiac-No chest pain or palpitations.  Respiratory-No cough, sob, or hemoptysis.  GI-No nausea, vomitting, diarrhea, constipation, or blood in the stool.  Musculoskeletal-chronic back pains, .    Physical Exam  /84  Pulse 84  Temp 97.1  F (36.2  C) (Temporal)  Resp 16  Wt 241 lb (109.3 kg)  SpO2 99%  BMI 35.59 kg/m2  General Appearance-healthy, alert, no distress  Legs are improved, trace edema  Skin with rough actinic keratosis on arms.    ASSESSMENT:  This is a 77-year-old gentleman who comes in with multiple issues.  He has been having falls which got better when we held his mirtazapine.  He still had a fall and injured his left shoulder.  He had x-rays of are negative, he is always had some decreased range of motion of looks like a chronic rotator cuff injury.  He is working with physical therapy and will continue to do that.    He has chronic back pain which is in the left buttock region, he saw orthopedics and is not thought to have this in his hip.  The pain is debilitating for him.  He will be seen neurology to see if there is any neurological component.  He has had injections which have not worked the last 2 times.  Possibly see a neurosurgeon for surgical correction if the neurologist agrees with that.  I do worry about trying gabapentin and that it may cause him to have more falls or grogginess.    The patient also has multiple skin lesions which are roughened appear  to be actinic keratoses, multiple areas we will refer him to dermatology.    Weight and swelling are better on lasix, will continue this for him, refilled today.      Electronically signed by Richi Alvarez MD

## 2018-06-20 NOTE — MR AVS SNAPSHOT
After Visit Summary   6/20/2018    Endy Navarro    MRN: [id number]           Patient Information     Date Of Birth          1940        Visit Information        Provider Department      6/20/2018 8:00 AM Richi Alvarez MD [facility]         Follow-ups after your visit        Your next 10 appointments already scheduled     Jun 22, 2018  8:45 AM CDT   Ortho Treatment with Matilda Kneisl, PT   [facility] ([facility])    [address] [place] [address]   [phone]            Jun 25, 2018  1:15 PM CDT   Ortho Treatment with Matilda Kneisl, PT   [facility] ([facility])    [address] [place] [address]   [phone]            Jun 27, 2018  3:00 PM CDT   Ortho Treatment with Matilda Kneisl, PT   [facility] ([facility])    [address] [place] [address]   [phone]            Jul 02, 2018 10:30 AM CDT   Ortho Treatment with Matilda Kneisl, PT   [facility] ([facility])    [address] [place] [address]   [phone]            Jul 05, 2018 11:15 AM CDT   Ortho Treatment with Matilda Kneisl, PT   [facility] ([facility])    [address] [place] [address]   [phone]              Who to contact     If you have questions or need follow up information about today's clinic visit or your schedule please contact [facility] directly at [phone].  Normal or non-critical lab and imaging results will be communicated to you by MyChart, letter or phone within 4 business days after the clinic has received the results. If you do not hear from us within 7 days, please contact the clinic through MyChart or phone. If you have a critical or abnormal lab result, we will notify you by phone as soon as possible.  Submit refill  requests through [employer] or call your pharmacy and they will forward the refill request to us. Please allow 3 business days for your refill to be completed.          Additional Information About Your Visit        [employer] Information     [employer] gives you secure access to your electronic health record. If you see a primary care provider, you can also send messages to your care team and make appointments. If you have questions, please call your primary care clinic.  If you do not have a primary care provider, please call [phone] and they will assist you.        Care EveryWhere ID     This is your Care EveryWhere ID. This could be used by other organizations to access your [facility] medical records  [id number]        Your Vitals Were     Pulse Temperature Respirations Pulse Oximetry BMI (Body Mass Index)       84 97.1  F (36.2  C) (Temporal) 16 99% 35.59 kg/m2        Blood Pressure from Last 3 Encounters:   06/20/18 136/84   05/31/18 (!) 149/97   05/17/18 120/74    Weight from Last 3 Encounters:   06/20/18 241 lb (109.3 kg)   05/31/18 247 lb (112 kg)   05/17/18 246 lb 11.2 oz (111.9 kg)              Today, you had the following     No orders found for display         Today's Medication Changes          These changes are accurate as of 6/20/18  8:02 AM.  If you have any questions, ask your nurse or doctor.               These medicines have changed or have updated prescriptions.        Dose/Directions    HYDROcodone-acetaminophen 5-325 MG per tablet   Commonly known as:  NORCO   This may have changed:    - when to take this  - additional instructions   Used for:  Left shoulder strain, initial encounter, Contusion of left hip, initial encounter        Dose:  1 tablet   Take 1 tablet by mouth every 4 hours as needed for pain maximum 10 tablet(s) per day   Quantity:  12 tablet   Refills:  0                Primary Care Provider Office Phone # Fax #    Richi Alvarez [phone] [address]  [address]  [facility] [address]        Equal Access to Services     SHAUNNA VALENZUELA : Hadii aad ku hadgroversamir Sourbanoali, waaxda luqadaha, qaybta kaalmakavya aviles. So [facility] [phone].    ATENCIÓN: Si habla español, tiene a leon disposición servicios gratuitos de asistencia lingüística. Satnam al [phone].    We comply with applicable federal civil rights laws and [place] laws. We do not discriminate on the basis of race, color, national origin, age, disability, sex, sexual orientation, or gender identity.            Thank you!     Thank you for choosing [facility]  for your care. Our goal is always to provide you with excellent care. Hearing back from our patients is one way we can continue to improve our services. Please take a few minutes to complete the written survey that you may receive in the mail after your visit with us. Thank you!             Your Updated Medication List - Protect others around you: Learn how to safely use, store and throw away your medicines at www.disposemymeds.org.          This list is accurate as of 6/20/18  8:02 AM.  Always use your most recent med list.                   Brand Name Dispense Instructions for use Diagnosis    acetaminophen 650 MG 8 hour tablet     100 tablet    Take 650 mg by mouth every 6 hours    S/P AVR       allopurinol 100 MG tablet    ZYLOPRIM    180 tablet    Take 1 tablet (100 mg) by mouth 2 times daily    Acute gouty arthritis       apixaban ANTICOAGULANT 5 MG tablet    ELIQUIS    180 tablet    Take 1 tablet (5 mg) by mouth 2 times daily    S/P AVR       aspirin 81 MG EC tablet      Take 1 tablet (81 mg) by mouth daily    S/P AVR       atorvastatin 40 MG tablet    LIPITOR    90 tablet    Take 1 tablet (40 mg) by mouth daily    Hyperlipidemia LDL goal <100       diltiazem 120 MG 24 hr capsule    DILACOR XR    90 capsule    Take 1 capsule (120 mg) by mouth daily    S/P AVR       finasteride 5 MG tablet     PROSCAR    90 tablet    Take 1 tablet (5 mg) by mouth daily        FLUoxetine 20 MG capsule    PROzac    270 capsule    Take 3 capsules (60 mg) by mouth daily    S/P AVR       furosemide 20 MG tablet    LASIX    30 tablet    Take 1 tablet (20 mg) by mouth daily    Essential hypertension with goal blood pressure less than 140/90       HYDROcodone-acetaminophen 5-325 MG per tablet    NORCO    12 tablet    Take 1 tablet by mouth every 4 hours as needed for pain maximum 10 tablet(s) per day    Left shoulder strain, initial encounter, Contusion of left hip, initial encounter       metoprolol tartrate 50 MG tablet    LOPRESSOR    180 tablet    Take 1 tablet (50 mg) by mouth 2 times daily    S/P AVR       nitroGLYcerin 0.4 MG sublingual tablet    NITROSTAT    25 tablet    Place 1 tablet (0.4 mg) under the tongue every 5 minutes as needed for chest pain    Coronary artery disease involving native coronary artery of native heart without angina pectoris       polyethylene glycol Packet    MIRALAX/GLYCOLAX    7 packet    Take 17 g by mouth daily    S/P AVR

## 2018-06-21 ENCOUNTER — TRANSFERRED RECORDS (OUTPATIENT)
Dept: HEALTH INFORMATION MANAGEMENT | Facility: CLINIC | Age: 78
End: 2018-06-21

## 2018-06-22 ENCOUNTER — HOSPITAL ENCOUNTER (OUTPATIENT)
Dept: PHYSICAL THERAPY | Facility: CLINIC | Age: 78
Setting detail: THERAPIES SERIES
End: 2018-06-22
Attending: ORTHOPAEDIC SURGERY
Payer: MEDICARE

## 2018-06-22 ENCOUNTER — MEDICAL CORRESPONDENCE (OUTPATIENT)
Dept: HEALTH INFORMATION MANAGEMENT | Facility: CLINIC | Age: 78
End: 2018-06-22

## 2018-06-22 PROCEDURE — 97140 MANUAL THERAPY 1/> REGIONS: CPT | Mod: GP | Performed by: PHYSICAL THERAPIST

## 2018-06-22 PROCEDURE — 40000718 ZZHC STATISTIC PT DEPARTMENT ORTHO VISIT: Performed by: PHYSICAL THERAPIST

## 2018-06-22 PROCEDURE — 97110 THERAPEUTIC EXERCISES: CPT | Mod: GP | Performed by: PHYSICAL THERAPIST

## 2018-06-25 ENCOUNTER — HOSPITAL ENCOUNTER (OUTPATIENT)
Dept: CT IMAGING | Facility: CLINIC | Age: 78
Discharge: HOME | End: 2018-06-25
Attending: PSYCHIATRY & NEUROLOGY | Admitting: PSYCHIATRY & NEUROLOGY
Payer: MEDICARE

## 2018-06-25 ENCOUNTER — HOSPITAL ENCOUNTER (OUTPATIENT)
Dept: PHYSICAL THERAPY | Facility: CLINIC | Age: 78
Setting detail: THERAPIES SERIES
End: 2018-06-25
Attending: ORTHOPAEDIC SURGERY
Payer: MEDICARE

## 2018-06-25 DIAGNOSIS — M54.50 CHRONIC LOWER BACK PAIN: ICD-10-CM

## 2018-06-25 DIAGNOSIS — G91.2 NORMAL PRESSURE HYDROCEPHALUS (H): ICD-10-CM

## 2018-06-25 DIAGNOSIS — Z98.2 VP (VENTRICULOPERITONEAL) SHUNT STATUS: ICD-10-CM

## 2018-06-25 DIAGNOSIS — G89.29 CHRONIC LOWER BACK PAIN: ICD-10-CM

## 2018-06-25 PROCEDURE — 70450 CT HEAD/BRAIN W/O DYE: CPT

## 2018-06-25 PROCEDURE — 40000718 ZZHC STATISTIC PT DEPARTMENT ORTHO VISIT: Performed by: PHYSICAL THERAPIST

## 2018-06-25 PROCEDURE — 97110 THERAPEUTIC EXERCISES: CPT | Mod: GP | Performed by: PHYSICAL THERAPIST

## 2018-06-25 PROCEDURE — 97140 MANUAL THERAPY 1/> REGIONS: CPT | Mod: GP | Performed by: PHYSICAL THERAPIST

## 2018-06-25 PROCEDURE — 97750 PHYSICAL PERFORMANCE TEST: CPT | Mod: GP,59 | Performed by: PHYSICAL THERAPIST

## 2018-06-25 NOTE — PROGRESS NOTES
Appropriate assistive devices provided during their visit. y (Yes, No, N/A) cane (list device)    Exam table and/or cart  placed in the lowest position. y (Yes, No, N/A)    Brakes on tables/carts/wheelchairs used at all times. y (Yes, No, N/A)    Non slip footwear applied. y (Yes, No, NA)    Patient was accompanied by staff throughout visit. y (Yes, No, N/A)    Equipment safety straps used. y (Yes, No, N/A)    Assist with toileting. Na   (Yes, No, N/A)

## 2018-06-25 NOTE — PROGRESS NOTES
Functional Gait Assessment (FGA): The FGA assesses postural stability during various walking tasks.   Gait assistive device used: None     Patient Score: 18/30  Scores of <22/30 have been correlated with predicting falls in community-dwelling older adults according to Clint & Giancarlo 2010.   Scores of <18/30 have been correlated with increased risk for falls in patients with Parkinsons Disease according to Phani Tinoco Zhou et al 2014.  Minimal Detectable Change for patients with acute/chronic stroke = 4.2 according to Adeola & Bernard 2009  Minimal Detectable Change for patients with vestibular disorder = 8 according to Clint & Giancarlo 2010    Assessment (rationale for performing, application to patient s function & care plan): Struggles with narrow base of support.  Minutes billed as physical performance test: 14 minutes.    Thank you for your referral.    Matilda Graves, PT, DPT  [facility]  [phone]

## 2018-07-02 ENCOUNTER — HOSPITAL ENCOUNTER (OUTPATIENT)
Dept: PHYSICAL THERAPY | Facility: CLINIC | Age: 78
Setting detail: THERAPIES SERIES
End: 2018-07-02
Attending: ORTHOPAEDIC SURGERY
Payer: MEDICARE

## 2018-07-02 PROCEDURE — 40000718 ZZHC STATISTIC PT DEPARTMENT ORTHO VISIT: Performed by: PHYSICAL THERAPIST

## 2018-07-02 PROCEDURE — G8980 MOBILITY D/C STATUS: HCPCS | Mod: GP,CJ | Performed by: PHYSICAL THERAPIST

## 2018-07-02 PROCEDURE — G8979 MOBILITY GOAL STATUS: HCPCS | Mod: GP,CI | Performed by: PHYSICAL THERAPIST

## 2018-07-02 PROCEDURE — 97110 THERAPEUTIC EXERCISES: CPT | Mod: GP | Performed by: PHYSICAL THERAPIST

## 2018-07-18 ENCOUNTER — OFFICE VISIT (OUTPATIENT)
Dept: NEUROSURGERY | Facility: CLINIC | Age: 78
End: 2018-07-18
Payer: MEDICARE

## 2018-07-18 VITALS
DIASTOLIC BLOOD PRESSURE: 86 MMHG | BODY MASS INDEX: 35.69 KG/M2 | HEIGHT: 69 IN | HEART RATE: 78 BPM | WEIGHT: 240.96 LBS | SYSTOLIC BLOOD PRESSURE: 154 MMHG

## 2018-07-18 DIAGNOSIS — G89.29 CHRONIC LEFT HIP PAIN: ICD-10-CM

## 2018-07-18 DIAGNOSIS — M25.552 CHRONIC LEFT HIP PAIN: ICD-10-CM

## 2018-07-18 DIAGNOSIS — M54.42 CHRONIC LOW BACK PAIN WITH LEFT-SIDED SCIATICA, UNSPECIFIED BACK PAIN LATERALITY: ICD-10-CM

## 2018-07-18 DIAGNOSIS — G89.29 CHRONIC LOW BACK PAIN WITH LEFT-SIDED SCIATICA, UNSPECIFIED BACK PAIN LATERALITY: ICD-10-CM

## 2018-07-18 DIAGNOSIS — Z98.2 S/P VP SHUNT: ICD-10-CM

## 2018-07-18 DIAGNOSIS — G91.2 NPH (NORMAL PRESSURE HYDROCEPHALUS) (H): Primary | ICD-10-CM

## 2018-07-18 ASSESSMENT — ENCOUNTER SYMPTOMS
NECK PAIN: 1
BACK PAIN: 1
LEG PAIN: 1
LOSS OF CONSCIOUSNESS: 0
PARALYSIS: 0
INSOMNIA: 0
MYALGIAS: 1
HYPERTENSION: 0
SEIZURES: 0
DECREASED CONCENTRATION: 0
ORTHOPNEA: 0
LIGHT-HEADEDNESS: 1
PALPITATIONS: 1
SPEECH CHANGE: 0
ARTHRALGIAS: 1
DEPRESSION: 1
HEADACHES: 0
JOINT SWELLING: 0
NERVOUS/ANXIOUS: 1
SYNCOPE: 0
STIFFNESS: 1
SLEEP DISTURBANCES DUE TO BREATHING: 0
TINGLING: 1
MUSCLE WEAKNESS: 1
HYPOTENSION: 1
MUSCLE CRAMPS: 1
DIZZINESS: 1
WEAKNESS: 1
PANIC: 1
EXERCISE INTOLERANCE: 1
DISTURBANCES IN COORDINATION: 1
TREMORS: 0

## 2018-07-18 ASSESSMENT — PAIN SCALES - GENERAL: PAINLEVEL: MODERATE PAIN (5)

## 2018-07-18 NOTE — MR AVS SNAPSHOT
After Visit Summary   7/18/2018    Endy Navarro    MRN: [id number]           Patient Information     Date Of Birth          1940        Visit Information        Provider Department      7/18/2018 10:45 AM Moises Sebastian MD [facility] Neurosurgery        Today's Diagnoses     NPH (normal pressure hydrocephalus)    -  1    S/P  shunt        Chronic left hip pain        Chronic low back pain with left-sided sciatica, unspecified back pain laterality           Follow-ups after your visit        Follow-up notes from your care team     Return if symptoms worsen or fail to improve.      Your next 10 appointments already scheduled     Nov 15, 2018 10:30 AM CST   [facility]    [address] [place] [id number] [phone]              Who to contact     Please call your clinic at [phone] to:    Ask questions about your health    Make or cancel appointments    Discuss your medicines    Learn about your test results    Speak to your doctor            Additional Information About Your Visit        MyChart Information     Tutor gives you secure access to your electronic health record. If you see a primary care provider, you can also send messages to your care team and make appointments. If you have questions, please call your primary care clinic.  If you do not have a primary care provider, please call [phone] and they will assist you.      Tutor is an electronic gateway that provides easy, online access to your medical records. With Tutor, you can request a clinic appointment, read your test results, renew a prescription or communicate with your care team.     To access your existing account, please contact your [facility] or call [phone] for assistance.        Care EveryWhere ID     This is your Care EveryWhere ID. This could be used by other  "organizations to access your [facility] medical records  [id number]        Your Vitals Were     Pulse Height BMI (Body Mass Index)             78 1.753 m (5' 9\") 35.58 kg/m2          Blood Pressure from Last 3 Encounters:   07/18/18 154/86   06/20/18 136/84   05/31/18 (!) 149/97    Weight from Last 3 Encounters:   07/18/18 109.3 kg (240 lb 15.4 oz)   06/20/18 109.3 kg (241 lb)   05/31/18 112 kg (247 lb)              Today, you had the following     No orders found for display         Today's Medication Changes          These changes are accurate as of 7/18/18 11:59 PM.  If you have any questions, ask your nurse or doctor.               These medicines have changed or have updated prescriptions.        Dose/Directions    HYDROcodone-acetaminophen 5-325 MG per tablet   Commonly known as:  NORCO   This may have changed:    - when to take this  - additional instructions   Used for:  Left shoulder strain, initial encounter, Contusion of left hip, initial encounter        Dose:  1 tablet   Take 1 tablet by mouth every 4 hours as needed for pain maximum 10 tablet(s) per day   Quantity:  12 tablet   Refills:  0                Primary Care Provider Office Phone # Fax #    Richi Alvarez [phone]       [address] [facility] [address]        Equal Access to Services     SHAUNNA VALENZUELA AH: Hadii steph urrutia hadasho Soomaali, waaxda luqadaha, qaybta kaalmada adeegyada, kavya paredes hayjames rod. So [facility] [phone].    ATENCIÓN: Si habla español, tiene a leon disposición servicios gratuitos de asistencia lingüística. Llame al [phone].    We comply with applicable federal civil rights laws and [place] laws. We do not discriminate on the basis of race, color, national origin, age, disability, sex, sexual orientation, or gender identity.            Thank you!     Thank you for choosing [facility]  for your care. Our goal is always to provide you with excellent care. Hearing back " from our patients is one way we can continue to improve our services. Please take a few minutes to complete the written survey that you may receive in the mail after your visit with us. Thank you!             Your Updated Medication List - Protect others around you: Learn how to safely use, store and throw away your medicines at www.disposemymeds.org.          This list is accurate as of 7/18/18 11:59 PM.  Always use your most recent med list.                   Brand Name Dispense Instructions for use Diagnosis    acetaminophen 650 MG 8 hour tablet     100 tablet    Take 650 mg by mouth every 6 hours    S/P AVR       allopurinol 100 MG tablet    ZYLOPRIM    180 tablet    Take 1 tablet (100 mg) by mouth 2 times daily    Acute gouty arthritis       apixaban ANTICOAGULANT 5 MG tablet    ELIQUIS    180 tablet    Take 1 tablet (5 mg) by mouth 2 times daily    S/P AVR       aspirin 81 MG EC tablet      Take 1 tablet (81 mg) by mouth daily    S/P AVR       atorvastatin 40 MG tablet    LIPITOR    90 tablet    Take 1 tablet (40 mg) by mouth daily    Hyperlipidemia LDL goal <100       diltiazem 120 MG 24 hr capsule    DILACOR XR    90 capsule    Take 1 capsule (120 mg) by mouth daily    S/P AVR       finasteride 5 MG tablet    PROSCAR    90 tablet    Take 1 tablet (5 mg) by mouth daily        FLUoxetine 20 MG capsule    PROzac    270 capsule    Take 3 capsules (60 mg) by mouth daily    S/P AVR       furosemide 20 MG tablet    LASIX    90 tablet    Take 1 tablet (20 mg) by mouth daily    Essential hypertension with goal blood pressure less than 140/90       HYDROcodone-acetaminophen 5-325 MG per tablet    NORCO    12 tablet    Take 1 tablet by mouth every 4 hours as needed for pain maximum 10 tablet(s) per day    Left shoulder strain, initial encounter, Contusion of left hip, initial encounter       metoprolol tartrate 50 MG tablet    LOPRESSOR    180 tablet    Take 1 tablet (50 mg) by mouth 2 times daily    S/P AVR        nitroGLYcerin 0.4 MG sublingual tablet    NITROSTAT    25 tablet    Place 1 tablet (0.4 mg) under the tongue every 5 minutes as needed for chest pain    Coronary artery disease involving native coronary artery of native heart without angina pectoris       polyethylene glycol Packet    MIRALAX/GLYCOLAX    7 packet    Take 17 g by mouth daily    S/P AVR

## 2018-07-18 NOTE — LETTER
7/18/2018       RE: Endy Navarro  [address] [facility] [address]     Dear Colleague,    Thank you for referring your patient, Endy Navarro, to the [facility] NEUROSURGERY at [facility]. Please see a copy of my visit note below.    Neurosurgery Consultation    Endy Navarro MRN# [id number]   YOB: 1940 Age: 77 year old   Date of Service: 07/20/18     Provider Requesting Consultation:  Sophia Hanley MD  [facility] Neurology  [place] office  [address]  [place] [address]    We had the pleasures of seeing Endy Navarro in the office at Dr. Hanley's request in consultation regarding his shunted normal pressure hydrocephalus (NPH).          History of Present Illness:   This is a 77 year old male with a past medical history significant for paroxysmal supraventricular tachycardia, heart valve dysfunction, MI, CAD, hyperlipidemia, gout, chronic low back pain, Bell's palsy, and arthritic pain in both hips.  The patient developed gait difficulty and frequent falls about 4-5 years ago. Workup showed that he has normal pressure hydrocephalus for which he underwent ventricular peritoneal () shunt placement with a [employer] Strata valve in [place] 3-4 years ago.  He had discontinued Coumadin for the  shunt procedure and resumed the medication 3 days postoperatively.  About a week later, he developed severe headache secondary to subdural hematoma found on radiographic image. This was subsequently resolved by shunt pressure adjustment from the performance level of 1.0 to 2.0.  About 6 months after the  shunt, he also underwent heart valve replacement and has since been taking Eliquis instead of Coumadin.    The patient recently saw Neurology because of concern of recurrent and frequent falls. He was taken off Remeron by his PCP and since then noted improved falls. The patient admits to chronic back and left hip as well as  leg pain that affects his ability to walk. He is undergoing PT for this. He had a few cortisone injections, which was not helpful. Neurology recommended EMG of bilateral lower extremities, a new head CT to rule out bleed, and neurosurgical consultation given his NPH history. On questioning, he denies recurrent gait difficulty, shuffling gait, unusual or positional headaches, changes in cognitive function and/or problem with incontinence.             Past Medical History:     Past Medical History:   Diagnosis Date     Anxiety state, unspecified      Atrial fibrillation (H)      Bell's palsy 12/12/1997     Bioprosthetic aortic valve replacement during current hospitalization      Coronary atherosclerosis of unspecified type of vessel, native or graft     coronary artery disease with history of MI and stent placement      Gout, unspecified      Hydrocephalus 2015     Old myocardial infarction 3/1998    Hx of MI     Osteoarthrosis, unspecified whether generalized or localized, unspecified site     Diffuse migratory arthritis     Other and unspecified hyperlipidemia      Paroxysmal supraventricular tachycardia (H)     Hx of PAT     Pure hypercholesterolemia      Stented coronary artery               Past Surgical History:     Past Surgical History:   Procedure Laterality Date     C EXPLORATORY OF ABDOMEN  1/25/2007    Exploratory laparotomy.  Lysis of adhesions with reduction of internal hernia.     C ROTATOR CUFF STRAP      s/p rotator cuff surgery     COLONOSCOPY N/A 12/5/2016    Procedure: COMBINED COLONOSCOPY, SINGLE OR MULTIPLE BIOPSY/POLYPECTOMY BY BIOPSY;  Surgeon: Benjamin Cavazos MD;  Location: [facility] GI     HC COLONOSCOPY THRU STOMA, DIAGNOSTIC  8/23/2004     HC KNEE SCOPE,MED/LAT MENISCUS REPAIR       HC REMOVE TONSILS/ADENOIDS,<11 Y/O      unsure of age     Hydrocephalus  2015    shunt placed     INJECT EPIDURAL LUMBAR N/A 6/8/2017    Procedure: INJECT EPIDURAL LUMBAR;  lumbar epidural steroid injection Left  Lumbar 5 to sacral 1;  Surgeon: Pawan Davenport MD;  Location: [facility] OR     REMOVAL OF SPERM DUCT(S)      Vasectomy     REPLACE VALVE AORTIC N/A 9/14/2015    Procedure: REPLACE VALVE AORTIC;  Surgeon: Sang Chan MD;  Location:  OR              Social History:     Social History     Social History     Marital status:      Spouse name: N/A     Number of children: N/A     Years of education: N/A     Occupational History     Laundry Mat      Social History Main Topics     Smoking status: Former Smoker     Smokeless tobacco: Never Used      Comment: quit 25 yr ago     Alcohol use 1.8 - 2.4 oz/week     3 - 4 Standard drinks or equivalent per week      Comment: Occ     Drug use: No     Sexual activity: Yes     Partners: Female     Other Topics Concern      Service Yes     Blood Transfusions No     Caffeine Concern No     coffee- 3-4 cups daily     Occupational Exposure No     Hobby Hazards No     Sleep Concern Yes     trouble sleeping     Stress Concern No     Weight Concern No     Special Diet No     Back Care Yes     Low back pain with riding in car     Exercise No     Seat Belt Yes     Self-Exams Yes     Social History Narrative             Family History:     Family History   Problem Relation Age of Onset     Cerebrovascular Disease Mother      Cerebrovascular Disease Father      Hypertension Father      C.A.D. Sister      C.A.D. Brother      Diabetes Maternal Aunt      Prostate Cancer Brother      Cancer - colorectal No family hx of              Immunizations:     Immunization History   Administered Date(s) Administered     Influenza (High Dose) 3 valent vaccine 10/10/2011, 10/30/2014, 09/23/2015, 11/11/2016, 11/14/2017     Influenza (IIV3) PF 10/25/2002, 10/26/2004, 10/16/2008     Pneumo Conj 13-V (2010&after) 10/28/2015     Pneumococcal 23 valent 10/25/2002, 10/10/2011     TD (ADULT, 7+) 10/26/2004, 04/18/2007     Zoster vaccine, live 09/20/2016             Allergies:     Allergies    Allergen Reactions     Seroquel [Quetiapine] Other (See Comments)     Felt funny              Medications:     Current Outpatient Prescriptions:      acetaminophen 650 MG TABS, Take 650 mg by mouth every 6 hours, Disp: 100 tablet, Rfl:      allopurinol (ZYLOPRIM) 100 MG tablet, Take 1 tablet (100 mg) by mouth 2 times daily, Disp: 180 tablet, Rfl: 3     apixaban ANTICOAGULANT (ELIQUIS) 5 MG tablet, Take 1 tablet (5 mg) by mouth 2 times daily, Disp: 180 tablet, Rfl: 3     aspirin EC 81 MG EC tablet, Take 1 tablet (81 mg) by mouth daily, Disp: , Rfl:      atorvastatin (LIPITOR) 40 MG tablet, Take 1 tablet (40 mg) by mouth daily, Disp: 90 tablet, Rfl: 3     diltiazem (DILACOR XR) 120 MG 24 hr capsule, Take 1 capsule (120 mg) by mouth daily, Disp: 90 capsule, Rfl: 3     finasteride (PROSCAR) 5 MG tablet, Take 1 tablet (5 mg) by mouth daily, Disp: 90 tablet, Rfl: 3     FLUoxetine (PROZAC) 20 MG capsule, Take 3 capsules (60 mg) by mouth daily, Disp: 270 capsule, Rfl: 3     furosemide (LASIX) 20 MG tablet, Take 1 tablet (20 mg) by mouth daily, Disp: 90 tablet, Rfl: 1     HYDROcodone-acetaminophen (NORCO) 5-325 MG per tablet, Take 1 tablet by mouth every 4 hours as needed for pain maximum 10 tablet(s) per day (Patient taking differently: Take 1 tablet by mouth as needed for pain maximum 10 tablet(s) per day), Disp: 12 tablet, Rfl: 0     metoprolol (LOPRESSOR) 50 MG tablet, Take 1 tablet (50 mg) by mouth 2 times daily, Disp: 180 tablet, Rfl: 3     nitroglycerin (NITROSTAT) 0.4 MG SL tablet, Place 1 tablet (0.4 mg) under the tongue every 5 minutes as needed for chest pain, Disp: 25 tablet, Rfl: 4     polyethylene glycol (MIRALAX/GLYCOLAX) packet, Take 17 g by mouth daily, Disp: 7 packet, Rfl:             Review of Systems:     Review of Systems     Respiratory:   Negative for sleep disturbances due to breathing.    Cardiovascular:  Positive for palpitations, hypotension, leg pain, light-headedness and exercise intolerance.  "Negative for chest pain, orthopnea, fingers/toes turn blue, hypertension, syncope, pacemaker, few scattered varicosities, sleep disturbances due to breathing and edema.   Genitourinary:  Positive for male genitourinary complaint and reduced libido. Negative for bladder incontinence, dysuria, urgency, hematuria, flank pain, difficulty urinating, nocturia, voiding less frequently, scrotal pain, ulcerations and penile discharge.   Musculoskeletal:  Positive for myalgias, back pain, arthralgias, stiffness, muscle cramps, neck pain and muscle weakness. Negative for joint swelling and fracture.   Neurological:  Positive for dizziness, tingling, weakness, light-headedness, disturbances in coordination and difficulty walking. Negative for tremors, speech change, seizures, loss of consciousness, headaches and paralysis.   Psychiatric/Behavioral:  Positive for depression, mood swings and panic attacks. Negative for decreased concentration.    All other systems reviewed and are negative.            Physical Exam:    /86  Pulse 78  Ht 1.753 m (5' 9\")  Wt 109.3 kg (240 lb 15.4 oz)  BMI 35.58 kg/m2    Patient Supplied Answers To the  Pain Questionnaire  [facility] Pain -  Patient Entered Questionnaire/Answers 7/18/2018   What number best describes your pain right now:  0 = No pain  to  10 = Worst pain imaginable 7   How would you describe the pain? (back and leg) burning, cramping, sharp, dull, aching, throbbing   Which of the following worsen your pain? lying down, standing, walking, exercise   Which of the following improve or reduce your pain?  sitting, medication, relaxation   What number best describes your LOWEST pain in past 24 hours:  0 = No pain  to  10 = Worst pain imaginable 3   What number best describes your WORST pain in past 24 hours:  0 = No pain  to  10 = Worst pain imaginable 9   When is your pain worst? Night, Constant   What non-medicine treatments have you already had for your pain? physical therapy, " spine injections (shots)   Have you tried treating your pain with medication?  Yes   Are you currently taking medications for your pain? No   General: Well-nourished and well-developed in no acute distress.  HEENT: Head is normocephalic and atraumatic. Neck is supple without adenopathy or thyromegaly. Palpation of his bilateral temporomandibular joint shows no popping, clicking, tenderness and/or swelling. Conjunctivae are anicteric. Oropharynx and nasopharynx are without exudate and/or erythema. Inspection of his oral cavity shows no obvious ulcerations and/or lesions.   Lungs: Clear to auscultation.   Cardiovascular: Heart rate is regular with S1, S2 and no extra sounds.   Abdomen: + bowel sound x 4 quadrants. Soft. None tender. None distended.       Focused Neurologic Exam:   He is alert, oriented and cooperative. He answers questions and follows commands appropriately in fluent speech and normal tone. Memory and fund of knowledge are normal. Bilateral pupils are round, equal and reactive to light and accommodation. Extraocular movements are intact with no nystagmus and/or disconjugation. Visual fields to direct confrontation are full. Facial sensation and expression are symmetric. Hearing is to  baseline bilaterally. Gag reflex is present. Tongue and uvula are midline with normal palate movements and no fasciculation. Trapezius and sternocleidal strength is equivocal. He has no finger-to-nose dysmetria. Finger-nose-finger, heel-to-shin rub and rapid alternating finger and hand taps are normal.   Strength in all 4 extremities is 5/5. Deep tendon reflexes are symmetric. Sensation to light touch is intact in 4 extremities. Romberg test is negative. He is able to sit and rise from the chair independently. Gait is wide base and steady.           Imaging:   We reviewed and compared his head CT. We agreed with the radiologist's interpretation. Detail report and the study are available in [facility] and PACS.    CT SCAN OF  THE HEAD WITHOUT CONTRAST   6/25/2018 2:19 PM   HISTORY:  Normal pressure hydrocephalus.  (ventriculoperitoneal)  shunt status. Chronic lower back pain.  COMPARISON: 11/15/2017.    FINDINGS: The patient has a right parietal shunt catheter whose tip  transverses the right lateral ventricle protruding into the septum  slightly across midline. Ventricles are minimally more prominent than  2017 but the subarachnoid spaces are prominent as well and these  findings are felt to be due to atrophy. There is no evidence to  suggest any progressive hydrocephalus. Minimal nonspecific white  matter changes are also present. There is no evidence for acute  infarct, mass effect, intracranial hemorrhage, or skull fracture.     IMPRESSION:  1. Right parietal shunt catheter in place. No evidence to suggest any  progressive hydrocephalus.  2. Cerebral atrophy has slight progression since prior exam.     CECILIA ORTIZ MD           Assessment and Plan:     1. NPH (normal pressure hydrocephalus)    2. S/P  shunt    3. Chronic left hip pain    4. Chronic low back pain with left-sided sciatica, unspecified back pain laterality         We reassured Mr. Castro that his clinical presentation, examination and the head CT show no indications that further workup or shunt pressure adjustment is needed for his NPH at the present time. We suggest that he return to his PCP and Neurology to complete workup and treatment for his back and leg problems as they seem to be contributing to his falling problem. We also suggest an evaluation by one of our neuro spine surgeons if his PCP and Neurology determine surgical consult is needed.     We are happy to re-evaluate if additional concerns about shunt function arise.    Thank you very much for allowing us to participate in the care of this patient. Please do not hesitate to contact us with questions. We will keep you informed of his progress.     > 50% of the 60 minute visit today I spent in counseling,  education, and coordination of care for the problem outline above.    The patient was seen in conjunction with attending Dr. Sebastian. Dr. Sebastian reviewed the history and image(s), examined the patient, and jointly developed the plan of care.      This note was completed using Dragon voice recognition software.  Although reviewed after completion, some word and grammatical errors may occur.    Moises Sebastian MD, Professor Sanaz Shah, DNP, APRN, FNP-BC  [facility]   Department of Neurosurgery  Phone: [phone]  Fax: [phone]    I have personally reviewed the history and images, examined the patient and discussed the findings with Ms. Shah and the patient, reviewed and edited Ms. Shah's note and agree with the plan of care. - [facility]

## 2018-07-20 ASSESSMENT — ENCOUNTER SYMPTOMS
NECK PAIN: 1
SLEEP DISTURBANCES DUE TO BREATHING: 0
PALPITATIONS: 1
MUSCLE CRAMPS: 1
SEIZURES: 0
LIGHT-HEADEDNESS: 1
MYALGIAS: 1
PANIC: 1
ARTHRALGIAS: 1
SPEECH CHANGE: 0
FLANK PAIN: 0
MUSCLE WEAKNESS: 1
BACK PAIN: 1
DEPRESSION: 1
HEADACHES: 0
INSOMNIA: 0
HEMATURIA: 0
LEG PAIN: 1
STIFFNESS: 1
DECREASED CONCENTRATION: 0
LOSS OF CONSCIOUSNESS: 0
JOINT SWELLING: 0
ORTHOPNEA: 0
DIZZINESS: 1
PARALYSIS: 0
DIFFICULTY URINATING: 0
DISTURBANCES IN COORDINATION: 1
HYPERTENSION: 0
SYNCOPE: 0
EXERCISE INTOLERANCE: 1
WEAKNESS: 1
TREMORS: 0
DYSURIA: 0
TINGLING: 1
NERVOUS/ANXIOUS: 1
HYPOTENSION: 1

## 2018-07-20 NOTE — PROGRESS NOTES
Neurosurgery Consultation    Endy Navarro MRN# [id number]   YOB: 1940 Age: 77 year old   Date of Service: 07/20/18     Provider Requesting Consultation:  Sophia Hanley MD  [facility]  [place] office  [address] [facility] [address] [address]    We had the pleasures of seeing Endy Navarro in the office at Dr. Hanley's request in consultation regarding his shunted normal pressure hydrocephalus (NPH).          History of Present Illness:   This is a 77 year old male with a past medical history significant for paroxysmal supraventricular tachycardia, heart valve dysfunction, MI, CAD, hyperlipidemia, gout, chronic low back pain, Bell's palsy, and arthritic pain in both hips.  The patient developed gait difficulty and frequent falls about 4-5 years ago. Workup showed that he has normal pressure hydrocephalus for which he underwent ventricular peritoneal () shunt placement with a [employer] Strata valve in [place] 3-4 years ago.  He had discontinued Coumadin for the  shunt procedure and resumed the medication 3 days postoperatively.  About a week later, he developed severe headache secondary to subdural hematoma found on radiographic image. This was subsequently resolved by shunt pressure adjustment from the performance level of 1.0 to 2.0.  About 6 months after the  shunt, he also underwent heart valve replacement and has since been taking Eliquis instead of Coumadin.    The patient recently saw Neurology because of concern of recurrent and frequent falls. He was taken off Remeron by his PCP and since then noted improved falls. The patient admits to chronic back and left hip as well as leg pain that affects his ability to walk. He is undergoing PT for this. He had a few cortisone injections, which was not helpful. Neurology recommended EMG of bilateral lower extremities, a new head CT to rule out bleed, and neurosurgical consultation given his NPH history. On  questioning, he denies recurrent gait difficulty, shuffling gait, unusual or positional headaches, changes in cognitive function and/or problem with incontinence.             Past Medical History:     Past Medical History:   Diagnosis Date     Anxiety state, unspecified      Atrial fibrillation (H)      Bell's palsy 12/12/1997     Bioprosthetic aortic valve replacement during current hospitalization      Coronary atherosclerosis of unspecified type of vessel, native or graft     coronary artery disease with history of MI and stent placement      Gout, unspecified      Hydrocephalus 2015     Old myocardial infarction 3/1998    Hx of MI     Osteoarthrosis, unspecified whether generalized or localized, unspecified site     Diffuse migratory arthritis     Other and unspecified hyperlipidemia      Paroxysmal supraventricular tachycardia (H)     Hx of PAT     Pure hypercholesterolemia      Stented coronary artery               Past Surgical History:     Past Surgical History:   Procedure Laterality Date     C EXPLORATORY OF ABDOMEN  1/25/2007    Exploratory laparotomy.  Lysis of adhesions with reduction of internal hernia.     C ROTATOR CUFF STRAP      s/p rotator cuff surgery     COLONOSCOPY N/A 12/5/2016    Procedure: COMBINED COLONOSCOPY, SINGLE OR MULTIPLE BIOPSY/POLYPECTOMY BY BIOPSY;  Surgeon: Benjamin Cavazos MD;  Location:  GI     HC COLONOSCOPY THRU STOMA, DIAGNOSTIC  8/23/2004     HC KNEE SCOPE,MED/LAT MENISCUS REPAIR       HC REMOVE TONSILS/ADENOIDS,<11 Y/O      unsure of age     Hydrocephalus  2015    shunt placed     INJECT EPIDURAL LUMBAR N/A 6/8/2017    Procedure: INJECT EPIDURAL LUMBAR;  lumbar epidural steroid injection Left Lumbar 5 to sacral 1;  Surgeon: Pawan Davenport MD;  Location:  OR     REMOVAL OF SPERM DUCT(S)      Vasectomy     REPLACE VALVE AORTIC N/A 9/14/2015    Procedure: REPLACE VALVE AORTIC;  Surgeon: Sang Chan MD;  Location:  OR              Social History:      Social History     Social History     Marital status:      Spouse name: N/A     Number of children: N/A     Years of education: N/A     Occupational History     Laundry Mat      Social History Main Topics     Smoking status: Former Smoker     Smokeless tobacco: Never Used      Comment: quit 25 yr ago     Alcohol use 1.8 - 2.4 oz/week     3 - 4 Standard drinks or equivalent per week      Comment: Occ     Drug use: No     Sexual activity: Yes     Partners: Female     Other Topics Concern      Service Yes     Blood Transfusions No     Caffeine Concern No     coffee- 3-4 cups daily     Occupational Exposure No     Hobby Hazards No     Sleep Concern Yes     trouble sleeping     Stress Concern No     Weight Concern No     Special Diet No     Back Care Yes     Low back pain with riding in car     Exercise No     Seat Belt Yes     Self-Exams Yes     Social History Narrative             Family History:     Family History   Problem Relation Age of Onset     Cerebrovascular Disease Mother      Cerebrovascular Disease Father      Hypertension Father      C.A.D. Sister      C.A.D. Brother      Diabetes Maternal Aunt      Prostate Cancer Brother      Cancer - colorectal No family hx of              Immunizations:     Immunization History   Administered Date(s) Administered     Influenza (High Dose) 3 valent vaccine 10/10/2011, 10/30/2014, 09/23/2015, 11/11/2016, 11/14/2017     Influenza (IIV3) PF 10/25/2002, 10/26/2004, 10/16/2008     Pneumo Conj 13-V (2010&after) 10/28/2015     Pneumococcal 23 valent 10/25/2002, 10/10/2011     TD (ADULT, 7+) 10/26/2004, 04/18/2007     Zoster vaccine, live 09/20/2016             Allergies:     Allergies   Allergen Reactions     Seroquel [Quetiapine] Other (See Comments)     Felt funny              Medications:     Current Outpatient Prescriptions:      acetaminophen 650 MG TABS, Take 650 mg by mouth every 6 hours, Disp: 100 tablet, Rfl:      allopurinol (ZYLOPRIM) 100 MG  tablet, Take 1 tablet (100 mg) by mouth 2 times daily, Disp: 180 tablet, Rfl: 3     apixaban ANTICOAGULANT (ELIQUIS) 5 MG tablet, Take 1 tablet (5 mg) by mouth 2 times daily, Disp: 180 tablet, Rfl: 3     aspirin EC 81 MG EC tablet, Take 1 tablet (81 mg) by mouth daily, Disp: , Rfl:      atorvastatin (LIPITOR) 40 MG tablet, Take 1 tablet (40 mg) by mouth daily, Disp: 90 tablet, Rfl: 3     diltiazem (DILACOR XR) 120 MG 24 hr capsule, Take 1 capsule (120 mg) by mouth daily, Disp: 90 capsule, Rfl: 3     finasteride (PROSCAR) 5 MG tablet, Take 1 tablet (5 mg) by mouth daily, Disp: 90 tablet, Rfl: 3     FLUoxetine (PROZAC) 20 MG capsule, Take 3 capsules (60 mg) by mouth daily, Disp: 270 capsule, Rfl: 3     furosemide (LASIX) 20 MG tablet, Take 1 tablet (20 mg) by mouth daily, Disp: 90 tablet, Rfl: 1     HYDROcodone-acetaminophen (NORCO) 5-325 MG per tablet, Take 1 tablet by mouth every 4 hours as needed for pain maximum 10 tablet(s) per day (Patient taking differently: Take 1 tablet by mouth as needed for pain maximum 10 tablet(s) per day), Disp: 12 tablet, Rfl: 0     metoprolol (LOPRESSOR) 50 MG tablet, Take 1 tablet (50 mg) by mouth 2 times daily, Disp: 180 tablet, Rfl: 3     nitroglycerin (NITROSTAT) 0.4 MG SL tablet, Place 1 tablet (0.4 mg) under the tongue every 5 minutes as needed for chest pain, Disp: 25 tablet, Rfl: 4     polyethylene glycol (MIRALAX/GLYCOLAX) packet, Take 17 g by mouth daily, Disp: 7 packet, Rfl:             Review of Systems:     Review of Systems     Respiratory:   Negative for sleep disturbances due to breathing.    Cardiovascular:  Positive for palpitations, hypotension, leg pain, light-headedness and exercise intolerance. Negative for chest pain, orthopnea, fingers/toes turn blue, hypertension, syncope, pacemaker, few scattered varicosities, sleep disturbances due to breathing and edema.   Genitourinary:  Positive for male genitourinary complaint and reduced libido. Negative for bladder  "incontinence, dysuria, urgency, hematuria, flank pain, difficulty urinating, nocturia, voiding less frequently, scrotal pain, ulcerations and penile discharge.   Musculoskeletal:  Positive for myalgias, back pain, arthralgias, stiffness, muscle cramps, neck pain and muscle weakness. Negative for joint swelling and fracture.   Neurological:  Positive for dizziness, tingling, weakness, light-headedness, disturbances in coordination and difficulty walking. Negative for tremors, speech change, seizures, loss of consciousness, headaches and paralysis.   Psychiatric/Behavioral:  Positive for depression, mood swings and panic attacks. Negative for decreased concentration.    All other systems reviewed and are negative.            Physical Exam:    /86  Pulse 78  Ht 1.753 m (5' 9\")  Wt 109.3 kg (240 lb 15.4 oz)  BMI 35.58 kg/m2    Patient Supplied Answers To the  Pain Questionnaire  [facility] Pain -  Patient Entered Questionnaire/Answers 7/18/2018   What number best describes your pain right now:  0 = No pain  to  10 = Worst pain imaginable 7   How would you describe the pain? (back and leg) burning, cramping, sharp, dull, aching, throbbing   Which of the following worsen your pain? lying down, standing, walking, exercise   Which of the following improve or reduce your pain?  sitting, medication, relaxation   What number best describes your LOWEST pain in past 24 hours:  0 = No pain  to  10 = Worst pain imaginable 3   What number best describes your WORST pain in past 24 hours:  0 = No pain  to  10 = Worst pain imaginable 9   When is your pain worst? Night, Constant   What non-medicine treatments have you already had for your pain? physical therapy, spine injections (shots)   Have you tried treating your pain with medication?  Yes   Are you currently taking medications for your pain? No   General: Well-nourished and well-developed in no acute distress.  HEENT: Head is normocephalic and atraumatic. Neck is supple " without adenopathy or thyromegaly. Palpation of his bilateral temporomandibular joint shows no popping, clicking, tenderness and/or swelling. Conjunctivae are anicteric. Oropharynx and nasopharynx are without exudate and/or erythema. Inspection of his oral cavity shows no obvious ulcerations and/or lesions.   Lungs: Clear to auscultation.   Cardiovascular: Heart rate is regular with S1, S2 and no extra sounds.   Abdomen: + bowel sound x 4 quadrants. Soft. None tender. None distended.       Focused Neurologic Exam:   He is alert, oriented and cooperative. He answers questions and follows commands appropriately in fluent speech and normal tone. Memory and fund of knowledge are normal. Bilateral pupils are round, equal and reactive to light and accommodation. Extraocular movements are intact with no nystagmus and/or disconjugation. Visual fields to direct confrontation are full. Facial sensation and expression are symmetric. Hearing is to  baseline bilaterally. Gag reflex is present. Tongue and uvula are midline with normal palate movements and no fasciculation. Trapezius and sternocleidal strength is equivocal. He has no finger-to-nose dysmetria. Finger-nose-finger, heel-to-shin rub and rapid alternating finger and hand taps are normal.   Strength in all 4 extremities is 5/5. Deep tendon reflexes are symmetric. Sensation to light touch is intact in 4 extremities. Romberg test is negative. He is able to sit and rise from the chair independently. Gait is wide base and steady.           Imaging:   We reviewed and compared his head CT. We agreed with the radiologist's interpretation. Detail report and the study are available in EPIC and PACS.    CT SCAN OF THE HEAD WITHOUT CONTRAST   6/25/2018 2:19 PM   HISTORY:  Normal pressure hydrocephalus.  (ventriculoperitoneal)  shunt status. Chronic lower back pain.  COMPARISON: 11/15/2017.    FINDINGS: The patient has a right parietal shunt catheter whose tip  transverses the  right lateral ventricle protruding into the septum  slightly across midline. Ventricles are minimally more prominent than  2017 but the subarachnoid spaces are prominent as well and these  findings are felt to be due to atrophy. There is no evidence to  suggest any progressive hydrocephalus. Minimal nonspecific white  matter changes are also present. There is no evidence for acute  infarct, mass effect, intracranial hemorrhage, or skull fracture.     IMPRESSION:  1. Right parietal shunt catheter in place. No evidence to suggest any  progressive hydrocephalus.  2. Cerebral atrophy has slight progression since prior exam.     CECILIA ORTIZ MD           Assessment and Plan:     1. NPH (normal pressure hydrocephalus)    2. S/P  shunt    3. Chronic left hip pain    4. Chronic low back pain with left-sided sciatica, unspecified back pain laterality         We reassured Mr. Castro that his clinical presentation, examination and the head CT show no indications that further workup or shunt pressure adjustment is needed for his NPH at the present time. We suggest that he return to his PCP and Neurology to complete workup and treatment for his back and leg problems as they seem to be contributing to his falling problem. We also suggest an evaluation by one of our neuro spine surgeons if his PCP and Neurology determine surgical consult is needed.     We are happy to re-evaluate if additional concerns about shunt function arise.    Thank you very much for allowing us to participate in the care of this patient. Please do not hesitate to contact us with questions. We will keep you informed of his progress.     > 50% of the 60 minute visit today I spent in counseling, education, and coordination of care for the problem outline above.    The patient was seen in conjunction with attending Dr. Sebastian. Dr. Sebastian reviewed the history and image(s), examined the patient, and jointly developed the plan of care.      This note was  completed using Dragon voice recognition software.  Although reviewed after completion, some word and grammatical errors may occur.    Moises Sebastian MD, Professor  Sanaz Shah, DNP, APRN, FNP-BC  [facility]   Department of Neurosurgery  Phone: [phone]  Fax: [phone]    I have personally reviewed the history and images, examined the patient and discussed the findings with Ms. Shah and the patient, reviewed and edited Ms. Shah's note and agree with the plan of care. - [facility]

## 2018-08-02 ENCOUNTER — HOSPITAL ENCOUNTER (EMERGENCY)
Facility: CLINIC | Age: 78
Discharge: HOME | End: 2018-08-02
Attending: FAMILY MEDICINE | Admitting: FAMILY MEDICINE
Payer: MEDICARE

## 2018-08-02 ENCOUNTER — APPOINTMENT (OUTPATIENT)
Dept: GENERAL RADIOLOGY | Facility: CLINIC | Age: 78
End: 2018-08-02
Attending: FAMILY MEDICINE
Payer: MEDICARE

## 2018-08-02 VITALS
TEMPERATURE: 97.9 F | BODY MASS INDEX: 35.25 KG/M2 | DIASTOLIC BLOOD PRESSURE: 77 MMHG | SYSTOLIC BLOOD PRESSURE: 119 MMHG | RESPIRATION RATE: 20 BRPM | OXYGEN SATURATION: 98 % | WEIGHT: 238 LBS | HEIGHT: 69 IN | HEART RATE: 60 BPM

## 2018-08-02 DIAGNOSIS — S70.02XA HEMATOMA OF LEFT HIP, INITIAL ENCOUNTER: ICD-10-CM

## 2018-08-02 LAB
APTT PPP: 37 SEC (ref 22–37)
BASOPHILS # BLD AUTO: 0 10E9/L (ref 0–0.2)
BASOPHILS NFR BLD AUTO: 0.4 %
DIFFERENTIAL METHOD BLD: ABNORMAL
EOSINOPHIL NFR BLD AUTO: 2.6 %
ERYTHROCYTE [DISTWIDTH] IN BLOOD BY AUTOMATED COUNT: 14 % (ref 10–15)
HCT VFR BLD AUTO: 37.6 % (ref 40–53)
HGB BLD-MCNC: 12.2 G/DL (ref 13.3–17.7)
IMM GRANULOCYTES # BLD: 0.1 10E9/L (ref 0–0.4)
IMM GRANULOCYTES NFR BLD: 0.8 %
INR PPP: 1.24 (ref 0.86–1.14)
LYMPHOCYTES # BLD AUTO: 1.3 10E9/L (ref 0.8–5.3)
LYMPHOCYTES NFR BLD AUTO: 16.5 %
MCH RBC QN AUTO: 31.1 PG (ref 26.5–33)
MCHC RBC AUTO-ENTMCNC: 32.4 G/DL (ref 31.5–36.5)
MCV RBC AUTO: 96 FL (ref 78–100)
MONOCYTES # BLD AUTO: 0.8 10E9/L (ref 0–1.3)
MONOCYTES NFR BLD AUTO: 10.2 %
NEUTROPHILS # BLD AUTO: 5.6 10E9/L (ref 1.6–8.3)
NEUTROPHILS NFR BLD AUTO: 69.5 %
NRBC # BLD AUTO: 0 10*3/UL
NRBC BLD AUTO-RTO: 0 /100
PLATELET # BLD AUTO: 153 10E9/L (ref 150–450)
RBC # BLD AUTO: 3.92 10E12/L (ref 4.4–5.9)
WBC # BLD AUTO: 8 10E9/L (ref 4–11)

## 2018-08-02 PROCEDURE — 99285 EMERGENCY DEPT VISIT HI MDM: CPT | Performed by: FAMILY MEDICINE

## 2018-08-02 PROCEDURE — 73502 X-RAY EXAM HIP UNI 2-3 VIEWS: CPT | Mod: TC

## 2018-08-02 PROCEDURE — 85610 PROTHROMBIN TIME: CPT | Performed by: FAMILY MEDICINE

## 2018-08-02 PROCEDURE — 85025 COMPLETE CBC W/AUTO DIFF WBC: CPT | Performed by: FAMILY MEDICINE

## 2018-08-02 PROCEDURE — 99284 EMERGENCY DEPT VISIT MOD MDM: CPT | Mod: Z6 | Performed by: FAMILY MEDICINE

## 2018-08-02 PROCEDURE — 85730 THROMBOPLASTIN TIME PARTIAL: CPT | Performed by: FAMILY MEDICINE

## 2018-08-02 ASSESSMENT — ENCOUNTER SYMPTOMS
LIGHT-HEADEDNESS: 0
NUMBNESS: 0
DIZZINESS: 0

## 2018-08-02 NOTE — ED TRIAGE NOTES
Has issues with balance and fell in the bathroom Sunday or Monday.  Patient is on Eliquis and has bruising to left hip.  Landed on left side and hit head and left shoulder.

## 2018-08-02 NOTE — ED PROVIDER NOTES
History     Chief Complaint   Patient presents with     Fall     The history is provided by the patient and the spouse.     Endy Navarro is a 77 year old male who presents to the ED complaining of a fall. Patient reports he fell onto his left side on Sunday, injuring his left hip. Patient's wife reports that the bruising is now running farther down his left thigh and the swelling has increased since the fall. Patient's pain at his left hip, left leg, and lower back is getting worse with each subsequent fall. His wife claims he fell 6 times in a matter of 10 days recently. Patient has right shoulder pain from a fall 6 weeks ago. Patient denies any new numbness or tingling to his feet as well as any new back pain. He denies lightheadedness or dizziness while walking with his cane today. Endy explains that he is not able to keep his balance each time he falls. Patient continues to take Tylenol for his pain. Patient is taking Eliquis for his atrial fibrillation. Patient has been seeing a neurosurgeon concerning Endy' frequent falls. He has not received the results of his most recent EMG.     Problem List:    Patient Active Problem List    Diagnosis Date Noted     Pain of left sacroiliac joint 05/17/2018     Priority: Medium     Lumbar radiculopathy 05/17/2018     Priority: Medium     Essential hypertension with goal blood pressure less than 140/90 11/11/2016     Priority: Medium     Major depressive disorder, recurrent episode, mild (H) 11/11/2016     Priority: Medium     Chronic atrial fibrillation (H) 11/11/2016     Priority: Medium     Hypertension goal BP (blood pressure) < 140/90 12/11/2015     Priority: Medium     S/P AVR (aortic valve replacement) 10/05/2015     Priority: Medium     Transient hyperglycemia post procedure 10/05/2015     Priority: Medium     Anemia due to blood loss, acute 10/05/2015     Priority: Medium     Delirium 10/05/2015     Priority: Medium     Mild major depression (H)  10/15/2012     Priority: Medium     Advanced directives, counseling/discussion 05/15/2012     Priority: Medium     Patient states has Advance Directive and will bring in a copy to clinic. 5/15/2012 [facility]         HYPERLIPIDEMIA LDL GOAL <100 10/31/2010     Priority: Medium     Gout      Priority: Medium     Problem list name updated by automated process. Provider to review       Chronic ischemic heart disease 04/19/2005     Priority: Medium     Problem list name updated by automated process. Provider to review          Past Medical History:    Past Medical History:   Diagnosis Date     Anxiety state, unspecified      Atrial fibrillation (H)      Bell's palsy 12/12/1997     Bioprosthetic aortic valve replacement during current hospitalization      Coronary atherosclerosis of unspecified type of vessel, native or graft      Gout, unspecified      Hydrocephalus 2015     Old myocardial infarction 3/1998     Osteoarthrosis, unspecified whether generalized or localized, unspecified site      Other and unspecified hyperlipidemia      Paroxysmal supraventricular tachycardia (H)      Pure hypercholesterolemia      Stented coronary artery        Past Surgical History:    Past Surgical History:   Procedure Laterality Date     C EXPLORATORY OF ABDOMEN  1/25/2007    Exploratory laparotomy.  Lysis of adhesions with reduction of internal hernia.     C ROTATOR CUFF STRAP      s/p rotator cuff surgery     COLONOSCOPY N/A 12/5/2016    Procedure: COMBINED COLONOSCOPY, SINGLE OR MULTIPLE BIOPSY/POLYPECTOMY BY BIOPSY;  Surgeon: Benjamin Cavazos MD;  Location:  GI     HC COLONOSCOPY THRU STOMA, DIAGNOSTIC  8/23/2004     HC KNEE SCOPE,MED/LAT MENISCUS REPAIR       HC REMOVE TONSILS/ADENOIDS,<11 Y/O      unsure of age     Hydrocephalus  2015    shunt placed     INJECT EPIDURAL LUMBAR N/A 6/8/2017    Procedure: INJECT EPIDURAL LUMBAR;  lumbar epidural steroid injection Left Lumbar 5 to sacral 1;  Surgeon: Pawan Davenport MD;  Location:   "OR     REMOVAL OF SPERM DUCT(S)      Vasectomy     REPLACE VALVE AORTIC N/A 9/14/2015    Procedure: REPLACE VALVE AORTIC;  Surgeon: Sang Chan MD;  Location:  OR       Family History:    Family History   Problem Relation Age of Onset     Cerebrovascular Disease Mother      Cerebrovascular Disease Father      Hypertension Father      C.A.D. Sister      C.A.D. Brother      Diabetes Maternal Aunt      Prostate Cancer Brother      Cancer - colorectal No family hx of        Social History:  Marital Status:   [2]  Social History   Substance Use Topics     Smoking status: Former Smoker     Smokeless tobacco: Never Used      Comment: quit 25 yr ago     Alcohol use 1.8 - 2.4 oz/week     3 - 4 Standard drinks or equivalent per week      Comment: couple times a week        Medications:      acetaminophen 650 MG TABS   allopurinol (ZYLOPRIM) 100 MG tablet   apixaban ANTICOAGULANT (ELIQUIS) 5 MG tablet   aspirin EC 81 MG EC tablet   atorvastatin (LIPITOR) 40 MG tablet   diltiazem (DILACOR XR) 120 MG 24 hr capsule   finasteride (PROSCAR) 5 MG tablet   FLUoxetine (PROZAC) 20 MG capsule   furosemide (LASIX) 20 MG tablet   HYDROcodone-acetaminophen (NORCO) 5-325 MG per tablet   metoprolol (LOPRESSOR) 50 MG tablet   nitroglycerin (NITROSTAT) 0.4 MG SL tablet   polyethylene glycol (MIRALAX/GLYCOLAX) packet         Review of Systems   HENT: Ear pain: right. Facial swelling: right sided.    Musculoskeletal:        Left hip, left leg, and lower back pain.   Skin:        Bruising to left hip/thigh region.   Neurological: Negative for dizziness, light-headedness and numbness.   All other systems reviewed and are negative.      Physical Exam   BP: 119/84  Heart Rate: 88  Temp: 97.9  F (36.6  C)  Resp: 16  Height: 175.3 cm (5' 9\")  Weight: 108 kg (238 lb)  SpO2: 97 %      Physical Exam   Constitutional: No distress.   HENT:   Head: Atraumatic.   Mouth/Throat: Oropharynx is clear and moist. No oropharyngeal exudate. "   Eyes: Pupils are equal, round, and reactive to light. No scleral icterus.   Cardiovascular: Normal heart sounds and intact distal pulses.    Pulmonary/Chest: Breath sounds normal. No respiratory distress.   Abdominal: Soft. Bowel sounds are normal. There is no tenderness.   Musculoskeletal: He exhibits no edema or tenderness.   Normal ROM to left hip.   Skin: Skin is warm. No rash noted. He is not diaphoretic.   Large bruise on left side.   Nursing note and vitals reviewed.             ED Course     ED Course     Procedures           Results for orders placed or performed during the hospital encounter of 08/02/18   XR Pelvis w Hip Left 1 View    Narrative    PELVIS AND LEFT HIP ONE VIEW   8/2/2018 1:01 PM     HISTORY:  Fall, pain, increasing bruising.     COMPARISON: CT abdomen and pelvis dated 5/31/2018.    FINDINGS:  Mild sclerosis of the bilateral inferior sacroiliac joints  most consistent with mild bilateral sacroiliitis. Degenerative changes  of the lumbar spine are partially imaged. No acute fracture or  malalignment is identified. Hip joint spaces appear well maintained.  Slight increased density in the subcutaneous adipose tissues along the  left proximal thigh could represent hematoma in the soft tissues.  Recommend clinical correlation.      Impression    IMPRESSION:  1. No acute fracture or malalignment.  2. Mild bilateral chronic sacroiliitis.  3. No significant abnormality of the hips is seen.   CBC with platelets differential   Result Value Ref Range    WBC 8.0 4.0 - 11.0 10e9/L    RBC Count 3.92 (L) 4.4 - 5.9 10e12/L    Hemoglobin 12.2 (L) 13.3 - 17.7 g/dL    Hematocrit 37.6 (L) 40.0 - 53.0 %    MCV 96 78 - 100 fl    MCH 31.1 26.5 - 33.0 pg    MCHC 32.4 31.5 - 36.5 g/dL    RDW 14.0 10.0 - 15.0 %    Platelet Count 153 150 - 450 10e9/L    Diff Method Automated Method     % Neutrophils 69.5 %    % Lymphocytes 16.5 %    % Monocytes 10.2 %    % Eosinophils 2.6 %    % Basophils 0.4 %    % Immature  Granulocytes 0.8 %    Nucleated RBCs 0 0 /100    Absolute Neutrophil 5.6 1.6 - 8.3 10e9/L    Absolute Lymphocytes 1.3 0.8 - 5.3 10e9/L    Absolute Monocytes 0.8 0.0 - 1.3 10e9/L    Absolute Basophils 0.0 0.0 - 0.2 10e9/L    Abs Immature Granulocytes 0.1 0 - 0.4 10e9/L    Absolute Nucleated RBC 0.0    INR   Result Value Ref Range    INR 1.24 (H) 0.86 - 1.14   Partial thromboplastin time   Result Value Ref Range    PTT 37 22 - 37 sec     X-ray showed no fractures.  Patient did have a slight drop in his hemoglobin which is expected with the amount of bruising that the patient had.  I reassured the patient and his wife that this should slowly resolve over time.  I would recommend more aggressive icing of the area to help minimize any further bleeding.  Patient will follow-up with his doctor early next week if things are not improving.  Patient will continue to use Tylenol as needed for discomfort.    Assessments & Plan (with Medical Decision Making)  Left hip hematoma     I have reviewed the nursing notes.    I have reviewed the findings, diagnosis, plan and need for follow up with the patient.            This document serves as a record of services personally performed by Rogelio Ruiz MD. It was created on their behalf by Harman Lambert, a trained medical scribe. The creation of this record is based on the provider's personal observations and the statements of the patient. This document has been checked and approved by the attending provider.    Note: Chart documentation done in part with Dragon Voice Recognition software. Although reviewed after completion, some word and grammatical errors may remain.    8/2/2018   [facility] EMERGENCY DEPARTMENT     Rogelio Ruiz MD  08/02/18 [id number]

## 2018-08-02 NOTE — ED AVS SNAPSHOT
[facility] Emergency Department    [address] [place] [address]    [place] [address]    Phone:  [phone]    Fax:  [phone]                                       Endy Navarro   MRN: [id number]    Department:  [facility] Emergency Department   Date of Visit:  8/2/2018           Patient Information     Date Of Birth          1940        Your diagnoses for this visit were:     Hematoma of left hip, initial encounter        You were seen by Rogelio Ruiz MD.      Follow-up Information     Follow up with Richi Alvarez MD. Schedule an appointment as soon as possible for a visit in 4 days.    Specialty:  Internal Medicine    Why:  If not improving.    Contact information:    [address] [facility] [place] [id number] [phone]        Discharge References/Attachments     BRUISES (CONTUSIONS) (ENGLISH)    HIP CONTUSION (ENGLISH)      Your next 10 appointments already scheduled     Nov 15, 2018 10:30 AM CST   New Visit with Odette Pastor MD   [facility] ([facility]    [address] [place] [address] [phone]              24 Hour Appointment Hotline       To make an appointment at any [facility], call [phone]). If you don't have a family doctor or clinic, we will help you find one. [facility] are conveniently located to serve the needs of you and your family.             Review of your medicines      Our records show that you are taking the medicines listed below. If these are incorrect, please call your family doctor or clinic.        Dose / Directions Last dose taken    acetaminophen 650 MG 8 hour tablet   Dose:  650 mg   Quantity:  100 tablet        Take 650 mg by mouth every 6 hours   Refills:  0        allopurinol 100 MG tablet   Commonly known as:  ZYLOPRIM   Dose:  100 mg   Quantity:  180 tablet        Take 1 tablet (100 mg) by mouth 2 times daily   Refills:  3        apixaban ANTICOAGULANT 5  MG tablet   Commonly known as:  ELIQUIS   Dose:  5 mg   Quantity:  180 tablet        Take 1 tablet (5 mg) by mouth 2 times daily   Refills:  3        aspirin 81 MG EC tablet   Dose:  81 mg        Take 1 tablet (81 mg) by mouth daily   Refills:  0        atorvastatin 40 MG tablet   Commonly known as:  LIPITOR   Dose:  40 mg   Quantity:  90 tablet        Take 1 tablet (40 mg) by mouth daily   Refills:  3        diltiazem 120 MG 24 hr capsule   Commonly known as:  DILACOR XR   Dose:  120 mg   Quantity:  90 capsule        Take 1 capsule (120 mg) by mouth daily   Refills:  3        finasteride 5 MG tablet   Commonly known as:  PROSCAR   Dose:  5 mg   Quantity:  90 tablet        Take 1 tablet (5 mg) by mouth daily   Refills:  3        FLUoxetine 20 MG capsule   Commonly known as:  PROzac   Dose:  60 mg   Quantity:  270 capsule        Take 3 capsules (60 mg) by mouth daily   Refills:  3        furosemide 20 MG tablet   Commonly known as:  LASIX   Dose:  20 mg   Quantity:  90 tablet        Take 1 tablet (20 mg) by mouth daily   Refills:  1        HYDROcodone-acetaminophen 5-325 MG per tablet   Commonly known as:  NORCO   Dose:  1 tablet   Quantity:  12 tablet        Take 1 tablet by mouth every 4 hours as needed for pain maximum 10 tablet(s) per day   Refills:  0        metoprolol tartrate 50 MG tablet   Commonly known as:  LOPRESSOR   Dose:  50 mg   Quantity:  180 tablet        Take 1 tablet (50 mg) by mouth 2 times daily   Refills:  3        nitroGLYcerin 0.4 MG sublingual tablet   Commonly known as:  NITROSTAT   Dose:  0.4 mg   Quantity:  25 tablet        Place 1 tablet (0.4 mg) under the tongue every 5 minutes as needed for chest pain   Refills:  4        polyethylene glycol Packet   Commonly known as:  MIRALAX/GLYCOLAX   Dose:  17 g   Quantity:  7 packet        Take 17 g by mouth daily   Refills:  0                Procedures and tests performed during your visit     CBC with platelets differential    INR    Partial  thromboplastin time    XR Pelvis w Hip Left 1 View      Orders Needing Specimen Collection     None      Pending Results     Date and Time Order Name Status Description    8/2/2018 1228 XR Pelvis w Hip Left 1 View Preliminary             Pending Culture Results     No orders found from 7/31/2018 to 8/3/2018.            Pending Results Instructions     If you had any lab results that were not finalized at the time of your Discharge, you can call the ED Lab Result RN at [phone]. You will be contacted by this team for any positive Lab results or changes in treatment. The nurses are available 7 days a week from 10A to 6:30P.  You can leave a message 24 hours per day and they will return your call.        Thank you for choosing [facility]       Thank you for choosing [facility] for your care. Our goal is always to provide you with excellent care. Hearing back from our patients is one way we can continue to improve our services. Please take a few minutes to complete the written survey that you may receive in the mail after you visit with us. Thank you!        [employer] Information     [employer] gives you secure access to your electronic health record. If you see a primary care provider, you can also send messages to your care team and make appointments. If you have questions, please call your primary care clinic.  If you do not have a primary care provider, please call [phone] and they will assist you.        Care EveryWhere ID     This is your Care EveryWhere ID. This could be used by other organizations to access your [facility] medical records  [id number]        Equal Access to Services     SHAUNNA VALENZUELA : Hadii steph Carmona, waaxda lucarolineadaha, qaybta kaalmada hector, kavya rod. So [facility] [phone].    ATENCIÓN: Si habla español, tiene a leon disposición servicios gratuitos de asistencia lingüística. Llame al [phone].    We comply with applicable federal civil rights  laws and [place] laws. We do not discriminate on the basis of race, color, national origin, age, disability, sex, sexual orientation, or gender identity.            After Visit Summary       This is your record. Keep this with you and show to your community pharmacist(s) and doctor(s) at your next visit.

## 2018-08-02 NOTE — ED AVS SNAPSHOT
[facility] Emergency Department    [address] [place] [address]    Phone:  [phone]    Fax:  [phone]                                       Endy Navarro   MRN: [id number]    Department:  [facility] Emergency Department   Date of Visit:  8/2/2018           After Visit Summary Signature Page     I have received my discharge instructions, and my questions have been answered. I have discussed any challenges I see with this plan with the nurse or doctor.    ..........................................................................................................................................  Patient/Patient Representative Signature      ..........................................................................................................................................  Patient Representative Print Name and Relationship to Patient    ..................................................               ................................................  Date                                            Time    ..........................................................................................................................................  Reviewed by Signature/Title    ...................................................              ..............................................  Date                                                            Time

## 2018-09-12 ENCOUNTER — OFFICE VISIT (OUTPATIENT)
Dept: DERMATOLOGY | Facility: CLINIC | Age: 78
End: 2018-09-12
Attending: INTERNAL MEDICINE
Payer: MEDICARE

## 2018-09-12 DIAGNOSIS — D22.39 FIBROUS PAPULE OF NOSE: ICD-10-CM

## 2018-09-12 DIAGNOSIS — L57.0 AK (ACTINIC KERATOSIS): Primary | ICD-10-CM

## 2018-09-12 PROCEDURE — 99202 OFFICE O/P NEW SF 15 MIN: CPT | Performed by: DERMATOLOGY

## 2018-09-12 ASSESSMENT — PAIN SCALES - GENERAL: PAINLEVEL: NO PAIN (0)

## 2018-09-12 NOTE — NURSING NOTE
Endy Navarro's goals for this visit include:   Chief Complaint   Patient presents with     Spot check     1 spot on right cheek and 1 on nose. Also spot on back. No personal or family hx of skin cancer.       He requests these members of his care team be copied on today's visit information:     PCP: Richi Alvarez    Referring Provider:  Richi Alvarez MD  [address] [place] [address]    There were no vitals taken for this visit.    Do you need any medication refills at today's visit? No.    Ai Leonard LPN

## 2018-09-12 NOTE — LETTER
"    9/12/2018         RE: Endy Navarro  [address] [address]        Dear Colleague,    Thank you for referring your patient, Endy Navarro, to the [facility]. Please see a copy of my visit note below.    [facility] Dermatology Note      Dermatology Problem List:  1.AKs  -Cryo at next visit  -Skin check at next visit    Encounter Date: Sep 12, 2018    CC:  Chief Complaint   Patient presents with     Spot check     1 spot on right cheek and 1 on nose. Also spot on back. No personal or family hx of skin cancer.         History of Present Illness:  Mr. Schumacher \"Ho\" NELSON Navarro is a 77 year old male who presents for evaluation of of a lesion on his right cheek and a lesion on his nose.  The lesion that is on his right cheek has been there for a couple years. It seems to come and go. He notes that both spots feel rough to the touch. They are not tender to the touch.  They have never bled.  He denies any past skin cancers or precancerous lesions.  Ho  Notes that his high school reunion is tomorrow, and he would like to look his best for that. No other concerns today.    Past Medical History:   Patient Active Problem List   Diagnosis     Chronic ischemic heart disease     Gout     HYPERLIPIDEMIA LDL GOAL <100     Advanced directives, counseling/discussion     Mild major depression (H)     S/P AVR (aortic valve replacement)     Transient hyperglycemia post procedure     Anemia due to blood loss, acute     Delirium     Hypertension goal BP (blood pressure) < 140/90     Essential hypertension with goal blood pressure less than 140/90     Major depressive disorder, recurrent episode, mild (H)     Chronic atrial fibrillation (H)     Pain of left sacroiliac joint     Lumbar radiculopathy     Past Medical History:   Diagnosis Date     Anxiety state, unspecified      Atrial fibrillation (H)      Bell's palsy 12/12/1997     Bioprosthetic aortic valve replacement " during current hospitalization      Coronary atherosclerosis of unspecified type of vessel, native or graft     coronary artery disease with history of MI and stent placement      Gout, unspecified      Hydrocephalus 2015     Old myocardial infarction 3/1998    Hx of MI     Osteoarthrosis, unspecified whether generalized or localized, unspecified site     Diffuse migratory arthritis     Other and unspecified hyperlipidemia      Paroxysmal supraventricular tachycardia (H)     Hx of PAT     Pure hypercholesterolemia      Stented coronary artery      Past Surgical History:   Procedure Laterality Date     C EXPLORATORY OF ABDOMEN  1/25/2007    Exploratory laparotomy.  Lysis of adhesions with reduction of internal hernia.     C ROTATOR CUFF STRAP      s/p rotator cuff surgery     COLONOSCOPY N/A 12/5/2016    Procedure: COMBINED COLONOSCOPY, SINGLE OR MULTIPLE BIOPSY/POLYPECTOMY BY BIOPSY;  Surgeon: Benjamin Cavazos MD;  Location:  GI     HC COLONOSCOPY THRU STOMA, DIAGNOSTIC  8/23/2004     HC KNEE SCOPE,MED/LAT MENISCUS REPAIR       HC REMOVE TONSILS/ADENOIDS,<11 Y/O      unsure of age     Hydrocephalus  2015    shunt placed     INJECT EPIDURAL LUMBAR N/A 6/8/2017    Procedure: INJECT EPIDURAL LUMBAR;  lumbar epidural steroid injection Left Lumbar 5 to sacral 1;  Surgeon: Pawan Davenport MD;  Location: [facility] OR     REMOVAL OF SPERM DUCT(S)      Vasectomy     REPLACE VALVE AORTIC N/A 9/14/2015    Procedure: REPLACE VALVE AORTIC;  Surgeon: Sang Chan MD;  Location:  OR       Social History:   reports that he has quit smoking. He has never used smokeless tobacco. He reports that he drinks about 1.8 - 2.4 oz of alcohol per week  He reports that he does not use illicit drugs. High school reunion scheduled from 9/13/18.    Family History:  Family History   Problem Relation Age of Onset     Cerebrovascular Disease Mother      Cerebrovascular Disease Father      Hypertension Father      C.A.D. Sister      C.A.D.  Brother      Diabetes Maternal Aunt      Prostate Cancer Brother      Cancer - colorectal No family hx of        Medications:  Current Outpatient Prescriptions   Medication Sig Dispense Refill     acetaminophen 650 MG TABS Take 650 mg by mouth every 6 hours 100 tablet      allopurinol (ZYLOPRIM) 100 MG tablet Take 1 tablet (100 mg) by mouth 2 times daily 180 tablet 3     apixaban ANTICOAGULANT (ELIQUIS) 5 MG tablet Take 1 tablet (5 mg) by mouth 2 times daily 180 tablet 3     aspirin EC 81 MG EC tablet Take 1 tablet (81 mg) by mouth daily       atorvastatin (LIPITOR) 40 MG tablet Take 1 tablet (40 mg) by mouth daily 90 tablet 3     diltiazem (DILACOR XR) 120 MG 24 hr capsule Take 1 capsule (120 mg) by mouth daily 90 capsule 3     finasteride (PROSCAR) 5 MG tablet Take 1 tablet (5 mg) by mouth daily 90 tablet 3     FLUoxetine (PROZAC) 20 MG capsule Take 3 capsules (60 mg) by mouth daily 270 capsule 3     furosemide (LASIX) 20 MG tablet Take 1 tablet (20 mg) by mouth daily 90 tablet 1     HYDROcodone-acetaminophen (NORCO) 5-325 MG per tablet Take 1 tablet by mouth every 4 hours as needed for pain maximum 10 tablet(s) per day (Patient taking differently: Take 1 tablet by mouth as needed for pain maximum 10 tablet(s) per day) 12 tablet 0     metoprolol (LOPRESSOR) 50 MG tablet Take 1 tablet (50 mg) by mouth 2 times daily 180 tablet 3     nitroglycerin (NITROSTAT) 0.4 MG SL tablet Place 1 tablet (0.4 mg) under the tongue every 5 minutes as needed for chest pain 25 tablet 4     polyethylene glycol (MIRALAX/GLYCOLAX) packet Take 17 g by mouth daily 7 packet        Allergies   Allergen Reactions     Seroquel [Quetiapine] Other (See Comments)     Felt funny       Review of Systems:  -Constitutional:  Feeling well, in usual state of health.  -Skin:  As per HPI, no additional concerns.    Physical exam:  Vitals: There were no vitals taken for this visit.  GEN: This is a well developed, well-nourished male in no acute distress,  in a pleasant mood.    SKIN: Focused examination of the scalp, face, neck, chest, and forearms/hands was performed.  -There are erythematous macules with overyling adherent scale on the right cheek, nasal tip, along temporal hairline bilaterally, and at the central chest.  - Left Nasal Alar Crease - skin colored papule consistent with fibrous papule  -No other lesions of concern on areas examined.       Impression/Plan:  1. Actinic keratosis:  Discussed precancerous nature.  Gave AAD handout for review.  Patient has important event (high school reunion) tomorrow.  Gave options of cryotherapy today versus rescheduling for the future as this is not an urgent concern.  Patient wished to reschedule to avoid having any sores on the face for the reunion.  Will add to next available.      2. Fibrous Papule - Left Nasal Alar Crease: Reassured of benign nature.    No further intervention required. Patient to report changes.     CC Dr. Richi Alvarez on close of this encounter.  Follow-up in next available, will do at least UBSE at that visit.      Staff Involved:  Scribe/Staff      Scribe Disclosure:   I, Lesley Tim, am serving as a scribe to document services personally performed by Dr. Jana Trinidad, based on data collection and the provider's statements to me.     Provider Disclosure:   The documentation recorded by the scribe accurately reflects the services I personally performed and the decisions made by me.    Jnaa Trinidad MD    Department of Dermatology  [facility]: Phone: [phone], Fax:[phone]  [facility]: Phone: [phone], Fax: [phone]              Again, thank you for allowing me to participate in the care of your patient.        Sincerely,        Jana Trinidad MD

## 2018-09-12 NOTE — MR AVS SNAPSHOT
After Visit Summary   9/12/2018    Endy Navarro    MRN: [id number]           Patient Information     Date Of Birth          1940        Visit Information        Provider Department      9/12/2018 1:00 PM Jana Trinidad MD [facility]         Follow-ups after your visit        Your next 10 appointments already scheduled     Sep 27, 2018  1:00 PM CDT   Return Visit with Jana Trinidad MD   [facility] ([facility])    [address] [place] [address] [phone]              Who to contact     If you have questions or need follow up information about today's clinic visit or your schedule please contact [facility] directly at [phone].  Normal or non-critical lab and imaging results will be communicated to you by MyChart, letter or phone within 4 business days after the clinic has received the results. If you do not hear from us within 7 days, please contact the clinic through [employer] or phone. If you have a critical or abnormal lab result, we will notify you by phone as soon as possible.  Submit refill requests through [employer] or call your pharmacy and they will forward the refill request to us. Please allow 3 business days for your refill to be completed.          Additional Information About Your Visit        [employer] Information     [employer] gives you secure access to your electronic health record. If you see a primary care provider, you can also send messages to your care team and make appointments. If you have questions, please call your primary care clinic.  If you do not have a primary care provider, please call [phone] and they will assist you.      [employer] is an electronic gateway that provides easy, online access to your medical records. With [employer], you can request a clinic appointment, read your test results, renew a prescription or communicate with your care team.     To access your  existing account, please contact your [facility] or call [phone] for assistance.        Care EveryWhere ID     This is your Care EveryWhere ID. This could be used by other organizations to access your [facility] medical records  [id number]         Blood Pressure from Last 3 Encounters:   08/02/18 119/77   07/18/18 154/86   06/20/18 136/84    Weight from Last 3 Encounters:   08/02/18 108 kg (238 lb)   07/18/18 109.3 kg (240 lb 15.4 oz)   06/20/18 109.3 kg (241 lb)              Today, you had the following     No orders found for display         Today's Medication Changes          These changes are accurate as of 9/12/18  1:42 PM.  If you have any questions, ask your nurse or doctor.               These medicines have changed or have updated prescriptions.        Dose/Directions    HYDROcodone-acetaminophen 5-325 MG per tablet   Commonly known as:  NORCO   This may have changed:    - when to take this  - additional instructions   Used for:  Left shoulder strain, initial encounter, Contusion of left hip, initial encounter        Dose:  1 tablet   Take 1 tablet by mouth every 4 hours as needed for pain maximum 10 tablet(s) per day   Quantity:  12 tablet   Refills:  0                Primary Care Provider Office Phone # Fax #    Richi Alvarez [phone]       [address] [facility] [address]        Equal Access to Services     SHAUNNA VALENZUELA : Hadii steph urrutia hadasho Soomaali, waaxda luqadaha, qaybta kaalmada hector, kavya rod. So [facility] [phone].    ATENCIÓN: Si habla español, tiene a leon disposición servicios gratuitos de asistencia lingüística. Satnam al [phone].    We comply with applicable federal civil rights laws and [place] laws. We do not discriminate on the basis of race, color, national origin, age, disability, sex, sexual orientation, or gender identity.            Thank you!     Thank you for choosing [facility]  [facility]  for your care. Our goal is always to provide you with excellent care. Hearing back from our patients is one way we can continue to improve our services. Please take a few minutes to complete the written survey that you may receive in the mail after your visit with us. Thank you!             Your Updated Medication List - Protect others around you: Learn how to safely use, store and throw away your medicines at www.disposemymeds.org.          This list is accurate as of 9/12/18  1:42 PM.  Always use your most recent med list.                   Brand Name Dispense Instructions for use Diagnosis    acetaminophen 650 MG 8 hour tablet     100 tablet    Take 650 mg by mouth every 6 hours    S/P AVR       allopurinol 100 MG tablet    ZYLOPRIM    180 tablet    Take 1 tablet (100 mg) by mouth 2 times daily    Acute gouty arthritis       apixaban ANTICOAGULANT 5 MG tablet    ELIQUIS    180 tablet    Take 1 tablet (5 mg) by mouth 2 times daily    S/P AVR       aspirin 81 MG EC tablet      Take 1 tablet (81 mg) by mouth daily    S/P AVR       atorvastatin 40 MG tablet    LIPITOR    90 tablet    Take 1 tablet (40 mg) by mouth daily    Hyperlipidemia LDL goal <100       diltiazem 120 MG 24 hr capsule    DILACOR XR    90 capsule    Take 1 capsule (120 mg) by mouth daily    S/P AVR       finasteride 5 MG tablet    PROSCAR    90 tablet    Take 1 tablet (5 mg) by mouth daily        FLUoxetine 20 MG capsule    PROzac    270 capsule    Take 3 capsules (60 mg) by mouth daily    S/P AVR       furosemide 20 MG tablet    LASIX    90 tablet    Take 1 tablet (20 mg) by mouth daily    Essential hypertension with goal blood pressure less than 140/90       HYDROcodone-acetaminophen 5-325 MG per tablet    NORCO    12 tablet    Take 1 tablet by mouth every 4 hours as needed for pain maximum 10 tablet(s) per day    Left shoulder strain, initial encounter, Contusion of left hip, initial encounter       metoprolol tartrate 50 MG  tablet    LOPRESSOR    180 tablet    Take 1 tablet (50 mg) by mouth 2 times daily    S/P AVR       nitroGLYcerin 0.4 MG sublingual tablet    NITROSTAT    25 tablet    Place 1 tablet (0.4 mg) under the tongue every 5 minutes as needed for chest pain    Coronary artery disease involving native coronary artery of native heart without angina pectoris       polyethylene glycol Packet    MIRALAX/GLYCOLAX    7 packet    Take 17 g by mouth daily    S/P AVR

## 2018-09-12 NOTE — PROGRESS NOTES
"Formerly [facility] Dermatology Note      Dermatology Problem List:  1.AKs  -Cryo at next visit  -Skin check at next visit    Encounter Date: Sep 12, 2018    CC:  Chief Complaint   Patient presents with     Spot check     1 spot on right cheek and 1 on nose. Also spot on back. No personal or family hx of skin cancer.         History of Present Illness:  Mr. Schumacher \"Ho\" NELSON Navarro is a 77 year old male who presents for evaluation of of a lesion on his right cheek and a lesion on his nose.  The lesion that is on his right cheek has been there for a couple years. It seems to come and go. He notes that both spots feel rough to the touch. They are not tender to the touch.  They have never bled.  He denies any past skin cancers or precancerous lesions.  Ho  Notes that his high school reunion is tomorrow, and he would like to look his best for that. No other concerns today.    Past Medical History:   Patient Active Problem List   Diagnosis     Chronic ischemic heart disease     Gout     HYPERLIPIDEMIA LDL GOAL <100     Advanced directives, counseling/discussion     Mild major depression (H)     S/P AVR (aortic valve replacement)     Transient hyperglycemia post procedure     Anemia due to blood loss, acute     Delirium     Hypertension goal BP (blood pressure) < 140/90     Essential hypertension with goal blood pressure less than 140/90     Major depressive disorder, recurrent episode, mild (H)     Chronic atrial fibrillation (H)     Pain of left sacroiliac joint     Lumbar radiculopathy     Past Medical History:   Diagnosis Date     Anxiety state, unspecified      Atrial fibrillation (H)      Bell's palsy 12/12/1997     Bioprosthetic aortic valve replacement during current hospitalization      Coronary atherosclerosis of unspecified type of vessel, native or graft     coronary artery disease with history of MI and stent placement      Gout, unspecified      Hydrocephalus 2015     Old myocardial infarction " 3/1998    Hx of MI     Osteoarthrosis, unspecified whether generalized or localized, unspecified site     Diffuse migratory arthritis     Other and unspecified hyperlipidemia      Paroxysmal supraventricular tachycardia (H)     Hx of PAT     Pure hypercholesterolemia      Stented coronary artery      Past Surgical History:   Procedure Laterality Date     C EXPLORATORY OF ABDOMEN  1/25/2007    Exploratory laparotomy.  Lysis of adhesions with reduction of internal hernia.     C ROTATOR CUFF STRAP      s/p rotator cuff surgery     COLONOSCOPY N/A 12/5/2016    Procedure: COMBINED COLONOSCOPY, SINGLE OR MULTIPLE BIOPSY/POLYPECTOMY BY BIOPSY;  Surgeon: Benjamin Cavazos MD;  Location:  GI     HC COLONOSCOPY THRU STOMA, DIAGNOSTIC  8/23/2004     HC KNEE SCOPE,MED/LAT MENISCUS REPAIR       HC REMOVE TONSILS/ADENOIDS,<13 Y/O      unsure of age     Hydrocephalus  2015    shunt placed     INJECT EPIDURAL LUMBAR N/A 6/8/2017    Procedure: INJECT EPIDURAL LUMBAR;  lumbar epidural steroid injection Left Lumbar 5 to sacral 1;  Surgeon: Pawan Davenport MD;  Location: [facility] OR     REMOVAL OF SPERM DUCT(S)      Vasectomy     REPLACE VALVE AORTIC N/A 9/14/2015    Procedure: REPLACE VALVE AORTIC;  Surgeon: Sang Chan MD;  Location:  OR       Social History:   reports that he has quit smoking. He has never used smokeless tobacco. He reports that he drinks about 1.8 - 2.4 oz of alcohol per week  He reports that he does not use illicit drugs. High school reunion scheduled from 9/13/18.    Family History:  Family History   Problem Relation Age of Onset     Cerebrovascular Disease Mother      Cerebrovascular Disease Father      Hypertension Father      C.A.D. Sister      C.A.D. Brother      Diabetes Maternal Aunt      Prostate Cancer Brother      Cancer - colorectal No family hx of        Medications:  Current Outpatient Prescriptions   Medication Sig Dispense Refill     acetaminophen 650 MG TABS Take 650 mg by mouth every 6  hours 100 tablet      allopurinol (ZYLOPRIM) 100 MG tablet Take 1 tablet (100 mg) by mouth 2 times daily 180 tablet 3     apixaban ANTICOAGULANT (ELIQUIS) 5 MG tablet Take 1 tablet (5 mg) by mouth 2 times daily 180 tablet 3     aspirin EC 81 MG EC tablet Take 1 tablet (81 mg) by mouth daily       atorvastatin (LIPITOR) 40 MG tablet Take 1 tablet (40 mg) by mouth daily 90 tablet 3     diltiazem (DILACOR XR) 120 MG 24 hr capsule Take 1 capsule (120 mg) by mouth daily 90 capsule 3     finasteride (PROSCAR) 5 MG tablet Take 1 tablet (5 mg) by mouth daily 90 tablet 3     FLUoxetine (PROZAC) 20 MG capsule Take 3 capsules (60 mg) by mouth daily 270 capsule 3     furosemide (LASIX) 20 MG tablet Take 1 tablet (20 mg) by mouth daily 90 tablet 1     HYDROcodone-acetaminophen (NORCO) 5-325 MG per tablet Take 1 tablet by mouth every 4 hours as needed for pain maximum 10 tablet(s) per day (Patient taking differently: Take 1 tablet by mouth as needed for pain maximum 10 tablet(s) per day) 12 tablet 0     metoprolol (LOPRESSOR) 50 MG tablet Take 1 tablet (50 mg) by mouth 2 times daily 180 tablet 3     nitroglycerin (NITROSTAT) 0.4 MG SL tablet Place 1 tablet (0.4 mg) under the tongue every 5 minutes as needed for chest pain 25 tablet 4     polyethylene glycol (MIRALAX/GLYCOLAX) packet Take 17 g by mouth daily 7 packet        Allergies   Allergen Reactions     Seroquel [Quetiapine] Other (See Comments)     Felt funny       Review of Systems:  -Constitutional:  Feeling well, in usual state of health.  -Skin:  As per HPI, no additional concerns.    Physical exam:  Vitals: There were no vitals taken for this visit.  GEN: This is a well developed, well-nourished male in no acute distress, in a pleasant mood.    SKIN: Focused examination of the scalp, face, neck, chest, and forearms/hands was performed.  -There are erythematous macules with overyling adherent scale on the right cheek, nasal tip, along temporal hairline bilaterally, and  at the central chest.  - Left Nasal Alar Crease - skin colored papule consistent with fibrous papule  -No other lesions of concern on areas examined.       Impression/Plan:  1. Actinic keratosis:  Discussed precancerous nature.  Gave AAD handout for review.  Patient has important event (high school reunion) tomorrow.  Gave options of cryotherapy today versus rescheduling for the future as this is not an urgent concern.  Patient wished to reschedule to avoid having any sores on the face for the reunion.  Will add to next available.      2. Fibrous Papule - Left Nasal Alar Crease: Reassured of benign nature.    No further intervention required. Patient to report changes.     CC Dr. Richi Alvarez on close of this encounter.  Follow-up in next available, will do at least UBSE at that visit.      Staff Involved:  Scribe/Staff      Scribe Disclosure:   I, Lesley Tim, am serving as a scribe to document services personally performed by Dr. Jana Trinidad, based on data collection and the provider's statements to me.     Provider Disclosure:   The documentation recorded by the scribe accurately reflects the services I personally performed and the decisions made by me.    Jana Trinidad MD    Department of Dermatology  [facility]: Phone: [phone], Fax:[phone]  [facility]: Phone: [phone], Fax: [phone]

## 2018-09-27 ENCOUNTER — OFFICE VISIT (OUTPATIENT)
Dept: DERMATOLOGY | Facility: CLINIC | Age: 78
End: 2018-09-27
Payer: MEDICARE

## 2018-09-27 DIAGNOSIS — L57.0 AK (ACTINIC KERATOSIS): ICD-10-CM

## 2018-09-27 DIAGNOSIS — D18.01 CHERRY ANGIOMA: ICD-10-CM

## 2018-09-27 DIAGNOSIS — L57.8 SUN-DAMAGED SKIN: Primary | ICD-10-CM

## 2018-09-27 DIAGNOSIS — L82.1 SEBORRHEIC KERATOSIS: ICD-10-CM

## 2018-09-27 DIAGNOSIS — L81.4 SOLAR LENTIGO: ICD-10-CM

## 2018-09-27 DIAGNOSIS — D36.10 NEUROFIBROMA: ICD-10-CM

## 2018-09-27 PROCEDURE — 99213 OFFICE O/P EST LOW 20 MIN: CPT | Mod: 25 | Performed by: DERMATOLOGY

## 2018-09-27 PROCEDURE — 17000 DESTRUCT PREMALG LESION: CPT | Performed by: DERMATOLOGY

## 2018-09-27 PROCEDURE — 17003 DESTRUCT PREMALG LES 2-14: CPT | Performed by: DERMATOLOGY

## 2018-09-27 ASSESSMENT — PAIN SCALES - GENERAL: PAINLEVEL: NO PAIN (0)

## 2018-09-27 NOTE — MR AVS SNAPSHOT
After Visit Summary   9/27/2018    Endy Navarro    MRN: [id number]           Patient Information     Date Of Birth          1940        Visit Information        Provider Department      9/27/2018 1:00 PM Jana Trinidad MD [facility]        Care Instructions    Cryotherapy    What is it?    Use of a very cold liquid, such as liquid nitrogen, to freeze and destroy abnormal skin cells that need to be removed    What should I expect?    Tenderness and redness    A small blister that might grow and fill with dark purple blood. There may be crusting.    More than one treatment may be needed if the lesions do not go away.    How do I care for the treated area?    Gently wash the area with your hands when bathing.    Use a thin layer of Vaseline to help with healing. You may use a Band-Aid.     The area should heal within 7-10 days and may leave behind a pink or lighter color.     Do not use an antibiotic or Neosporin ointment.     You may take acetaminophen (Tylenol) for pain.     Call your Doctor if you have:    Severe pain    Signs of infection (warmth, redness, cloudy yellow drainage, and or a bad smell)    Questions or concerns    Who should I call with questions?       [facility]: [phone]       [facility]: [phone]       For urgent needs outside of business hours call the [facility] at [phone]        and ask for the dermatology resident on call            Follow-ups after your visit        Follow-up notes from your care team     Return in about 6 months (around 3/27/2019) for Skin check - history [facility]MSC.      Who to contact     If you have questions or need follow up information about today's clinic visit or your schedule please contact [facility] directly at [phone].  Normal or non-critical lab and imaging results will be communicated to you by MyChart, letter or phone  within 4 business days after the clinic has received the results. If you do not hear from us within 7 days, please contact the clinic through [employer] or phone. If you have a critical or abnormal lab result, we will notify you by phone as soon as possible.  Submit refill requests through [employer] or call your pharmacy and they will forward the refill request to us. Please allow 3 business days for your refill to be completed.          Additional Information About Your Visit        [employer] Information     [employer] gives you secure access to your electronic health record. If you see a primary care provider, you can also send messages to your care team and make appointments. If you have questions, please call your primary care clinic.  If you do not have a primary care provider, please call [phone] and they will assist you.      [employer] is an electronic gateway that provides easy, online access to your medical records. With [employer], you can request a clinic appointment, read your test results, renew a prescription or communicate with your care team.     To access your existing account, please contact your [facility] or call [phone] for assistance.        Care EveryWhere ID     This is your Care EveryWhere ID. This could be used by other organizations to access your [facility] medical records  [id number]         Blood Pressure from Last 3 Encounters:   08/02/18 119/77   07/18/18 154/86   06/20/18 136/84    Weight from Last 3 Encounters:   08/02/18 108 kg (238 lb)   07/18/18 109.3 kg (240 lb 15.4 oz)   06/20/18 109.3 kg (241 lb)              Today, you had the following     No orders found for display         Today's Medication Changes          These changes are accurate as of 9/27/18  1:18 PM.  If you have any questions, ask your nurse or doctor.               These medicines have changed or have updated prescriptions.        Dose/Directions    HYDROcodone-acetaminophen 5-325 MG per  tablet   Commonly known as:  NORCO   This may have changed:    - when to take this  - additional instructions   Used for:  Left shoulder strain, initial encounter, Contusion of left hip, initial encounter        Dose:  1 tablet   Take 1 tablet by mouth every 4 hours as needed for pain maximum 10 tablet(s) per day   Quantity:  12 tablet   Refills:  0                Primary Care Provider Office Phone # Fax #    Richi Alvarez [phone]        [facility] [address]        Equal Access to Services     SHAUNNA VALENZUELA : Hadii aad ku hadasho Soomaali, waaxda luqadaha, qaybta kaalmada adeegyada, waxay idiin hayaan adeeg kharash laana . [facility] [phone].    ATENCIÓN: Si habla español, tiene a leon disposición servicios gratuitos de asistencia lingüística. [facility] [phone].    We comply with applicable federal civil rights laws and [place] laws. We do not discriminate on the basis of race, color, national origin, age, disability, sex, sexual orientation, or gender identity.            Thank you!     Thank you for choosing [facility]  for your care. Our goal is always to provide you with excellent care. Hearing back from our patients is one way we can continue to improve our services. Please take a few minutes to complete the written survey that you may receive in the mail after your visit with us. Thank you!             Your Updated Medication List - Protect others around you: Learn how to safely use, store and throw away your medicines at www.disposemymeds.org.          This list is accurate as of 9/27/18  1:18 PM.  Always use your most recent med list.                   Brand Name Dispense Instructions for use Diagnosis    acetaminophen 650 MG 8 hour tablet     100 tablet    Take 650 mg by mouth every 6 hours    S/P AVR       allopurinol 100 MG tablet    ZYLOPRIM    180 tablet    Take 1 tablet (100 mg) by mouth 2 times daily    Acute gouty arthritis        apixaban ANTICOAGULANT 5 MG tablet    ELIQUIS    180 tablet    Take 1 tablet (5 mg) by mouth 2 times daily    S/P AVR       aspirin 81 MG EC tablet      Take 1 tablet (81 mg) by mouth daily    S/P AVR       atorvastatin 40 MG tablet    LIPITOR    90 tablet    Take 1 tablet (40 mg) by mouth daily    Hyperlipidemia LDL goal <100       diltiazem 120 MG 24 hr capsule    DILACOR XR    90 capsule    Take 1 capsule (120 mg) by mouth daily    S/P AVR       finasteride 5 MG tablet    PROSCAR    90 tablet    Take 1 tablet (5 mg) by mouth daily        FLUoxetine 20 MG capsule    PROzac    270 capsule    Take 3 capsules (60 mg) by mouth daily    S/P AVR       furosemide 20 MG tablet    LASIX    90 tablet    Take 1 tablet (20 mg) by mouth daily    Essential hypertension with goal blood pressure less than 140/90       HYDROcodone-acetaminophen 5-325 MG per tablet    NORCO    12 tablet    Take 1 tablet by mouth every 4 hours as needed for pain maximum 10 tablet(s) per day    Left shoulder strain, initial encounter, Contusion of left hip, initial encounter       metoprolol tartrate 50 MG tablet    LOPRESSOR    180 tablet    Take 1 tablet (50 mg) by mouth 2 times daily    S/P AVR       nitroGLYcerin 0.4 MG sublingual tablet    NITROSTAT    25 tablet    Place 1 tablet (0.4 mg) under the tongue every 5 minutes as needed for chest pain    Coronary artery disease involving native coronary artery of native heart without angina pectoris       polyethylene glycol Packet    MIRALAX/GLYCOLAX    7 packet    Take 17 g by mouth daily    S/P AVR

## 2018-09-27 NOTE — LETTER
"    9/27/2018         RE: Endy Navarro  [address] [address]        Dear Colleague,    Thank you for referring your patient, Endy Navarro, to the [facility]. Please see a copy of my visit note below.    [facility] Dermatology Note      Dermatology Problem List:  1.AKs  -s/p cryotherapy 9/27/18  [facility]: [place], [place]    Encounter Date: Sep 27, 2018    CC:  Chief Complaint   Patient presents with     Cryotherapy     Lesions on face and back. No personal or family hx of SC.         History of Present Illness:  Mr. Schumacher \"Ho\" NELSON Navarro is a 78 year old male who presents for a follow up from last appointment for cryotherapy on an actinic keratoses. Cryotherapy was delayed due to upcoming class reunion.  Patient would also like his back examined today. He notes a few rough spots that have developed over recent years. They are not itchy or bothersome to him otherwise. No other concerns.     Past Medical History:   Patient Active Problem List   Diagnosis     Chronic ischemic heart disease     Gout     HYPERLIPIDEMIA LDL GOAL <100     Advanced directives, counseling/discussion     Mild major depression (H)     S/P AVR (aortic valve replacement)     Transient hyperglycemia post procedure     Anemia due to blood loss, acute     Delirium     Hypertension goal BP (blood pressure) < 140/90     Essential hypertension with goal blood pressure less than 140/90     Major depressive disorder, recurrent episode, mild (H)     Chronic atrial fibrillation (H)     Pain of left sacroiliac joint     Lumbar radiculopathy     Past Medical History:   Diagnosis Date     Anxiety state, unspecified      Atrial fibrillation (H)      Bell's palsy 12/12/1997     Bioprosthetic aortic valve replacement during current hospitalization      Coronary atherosclerosis of unspecified type of vessel, native or graft     coronary artery disease with history of MI and stent placement  "     Gout, unspecified      Hydrocephalus 2015     Old myocardial infarction 3/1998    Hx of MI     Osteoarthrosis, unspecified whether generalized or localized, unspecified site     Diffuse migratory arthritis     Other and unspecified hyperlipidemia      Paroxysmal supraventricular tachycardia (H)     Hx of PAT     Pure hypercholesterolemia      Stented coronary artery      Past Surgical History:   Procedure Laterality Date     C EXPLORATORY OF ABDOMEN  1/25/2007    Exploratory laparotomy.  Lysis of adhesions with reduction of internal hernia.     C ROTATOR CUFF STRAP      s/p rotator cuff surgery     COLONOSCOPY N/A 12/5/2016    Procedure: COMBINED COLONOSCOPY, SINGLE OR MULTIPLE BIOPSY/POLYPECTOMY BY BIOPSY;  Surgeon: Benjamin Cavazos MD;  Location:  GI     HC COLONOSCOPY THRU STOMA, DIAGNOSTIC  8/23/2004     HC KNEE SCOPE,MED/LAT MENISCUS REPAIR       HC REMOVE TONSILS/ADENOIDS,<11 Y/O      unsure of age     Hydrocephalus  2015    shunt placed     INJECT EPIDURAL LUMBAR N/A 6/8/2017    Procedure: INJECT EPIDURAL LUMBAR;  lumbar epidural steroid injection Left Lumbar 5 to sacral 1;  Surgeon: Pawan Davenport MD;  Location: [facility] OR     REMOVAL OF SPERM DUCT(S)      Vasectomy     REPLACE VALVE AORTIC N/A 9/14/2015    Procedure: REPLACE VALVE AORTIC;  Surgeon: Sang Chan MD;  Location:  OR       Social History:   reports that he has quit smoking. He has never used smokeless tobacco. He reports that he drinks about 1.8 - 2.4 oz of alcohol per week  He reports that he does not use illicit drugs. Spends allen in [place], [place].  with children, grandchildren, and great grandchildren.    Family History:  Family History   Problem Relation Age of Onset     Cerebrovascular Disease Mother      Cerebrovascular Disease Father      Hypertension Father      C.A.D. Sister      C.A.D. Brother      Diabetes Maternal Aunt      Prostate Cancer Brother      Cancer - colorectal No family hx of         Medications:  Current Outpatient Prescriptions   Medication Sig Dispense Refill     acetaminophen 650 MG TABS Take 650 mg by mouth every 6 hours 100 tablet      allopurinol (ZYLOPRIM) 100 MG tablet Take 1 tablet (100 mg) by mouth 2 times daily 180 tablet 3     apixaban ANTICOAGULANT (ELIQUIS) 5 MG tablet Take 1 tablet (5 mg) by mouth 2 times daily 180 tablet 3     aspirin EC 81 MG EC tablet Take 1 tablet (81 mg) by mouth daily       atorvastatin (LIPITOR) 40 MG tablet Take 1 tablet (40 mg) by mouth daily 90 tablet 3     diltiazem (DILACOR XR) 120 MG 24 hr capsule Take 1 capsule (120 mg) by mouth daily 90 capsule 3     finasteride (PROSCAR) 5 MG tablet Take 1 tablet (5 mg) by mouth daily 90 tablet 3     FLUoxetine (PROZAC) 20 MG capsule Take 3 capsules (60 mg) by mouth daily 270 capsule 3     furosemide (LASIX) 20 MG tablet Take 1 tablet (20 mg) by mouth daily 90 tablet 1     HYDROcodone-acetaminophen (NORCO) 5-325 MG per tablet Take 1 tablet by mouth every 4 hours as needed for pain maximum 10 tablet(s) per day (Patient taking differently: Take 1 tablet by mouth as needed for pain maximum 10 tablet(s) per day) 12 tablet 0     metoprolol (LOPRESSOR) 50 MG tablet Take 1 tablet (50 mg) by mouth 2 times daily 180 tablet 3     nitroglycerin (NITROSTAT) 0.4 MG SL tablet Place 1 tablet (0.4 mg) under the tongue every 5 minutes as needed for chest pain 25 tablet 4     polyethylene glycol (MIRALAX/GLYCOLAX) packet Take 17 g by mouth daily 7 packet        Allergies   Allergen Reactions     Seroquel [Quetiapine] Other (See Comments)     Felt funny       Review of Systems:  -Constitutional: Patient is otherwise feeling well, in usual state of health.   -Skin: As above in HPI. No additional skin concerns.      Physical exam:  Vitals: There were no vitals taken for this visit.  GEN: This is a well developed, well-nourished male in no acute distress, in a pleasant mood.    SKIN: Waist-up skin, which includes the head/face,  neck, both arms, chest, back, abdomen, digits and/or nails was examined.  -Barnett type II skin  -Moderate dermatoheliosis.  -There are erythematous macules with overyling adherent scale on the right preauricular cheek x 3, right medial cheek, nasal bridge, left temple x 2, left lateral brow, left cheek x 2, and left preauricular cheek x1.  -Pink firm papules on the right side of chest and right upper arm   -There are waxy stuck on tan to brown papules on the back.  -There are dome shaped bright red papules on the back.   -Scattered brown macules on sun exposed areas.  -No other areas of concern on examination.     Impression/Plan:  1. Sun damaged skin with solar lentigines - moderate to severe sundamage on examination today.    Sun precaution was advised including the use of sun screens of SPF 30 or higher, sun protective clothing, and avoidance of tanning beds.    2. 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     Patient reassured of the benign nature of these lesions.    3. Seborrheic keratosis, cherry angiomas - back    No further intervention required.     Patient reassured of the benign nature of these lesions.    4. Actinic keratosis - right preauricular cheek x 3,  right medial cheek, nasal bridge, left temple x 2, left lateral brow, left cheek x 2, and left preauricular cheek  Cryotherapy procedure note: After verbal consent and discussion of risks and benefits including but no limited to dyspigmentation/scar, blister, and pain, 11 was(were) treated with 1-2mm freeze border for 2 cycles with liquid nitrogen. Post cryotherapy instructions were provided.   Will have patient return for recheck when he returns from winter in [place].  Plan to discuss field treatment at next visit.      CC Dr. Richi Alvarez on close of this encounter.  Follow-up 6 months (April).       Staff Involved:  Scribe/Staff      Scribe Disclosure  I, Elaine Wu, am serving as a  scribe to document services personally performed by Dr. Jana Trinidad MD, based on data collection and the provider's statements to me.     Provider Disclosure:   The documentation recorded by the scribe accurately reflects the services I personally performed and the decisions made by me.    Jana Trinidad MD    Department of Dermatology  [facility]: Phone: [phone], Fax:[phone]  [facility]: Phone: [phone], Fax: [phone]                    Again, thank you for allowing me to participate in the care of your patient.        Sincerely,        Jana Trinidad MD

## 2018-09-27 NOTE — NURSING NOTE
Endy Navarro's goals for this visit include:   Chief Complaint   Patient presents with     Cryotherapy     Lesions on face and back. No personal or family hx of SC.       He requests these members of his care team be copied on today's visit information:     PCP: Richi Alvarez    Referring Provider:  No referring provider defined for this encounter.    There were no vitals taken for this visit.    Do you need any medication refills at today's visit? No.    Ai Leonard LPN

## 2018-09-27 NOTE — PROGRESS NOTES
"[facility] Dermatology Note      Dermatology Problem List:  1.AKs  -s/p cryotherapy 9/27/18  [place]: [place], [place]    Encounter Date: Sep 27, 2018    CC:  Chief Complaint   Patient presents with     Cryotherapy     Lesions on face and back. No personal or family hx of SC.         History of Present Illness:  Mr. Schumacher \"Ho\" NELSON Navarro is a 78 year old male who presents for a follow up from last appointment for cryotherapy on an actinic keratoses. Cryotherapy was delayed due to upcoming class reunion.  Patient would also like his back examined today. He notes a few rough spots that have developed over recent years. They are not itchy or bothersome to him otherwise. No other concerns.     Past Medical History:   Patient Active Problem List   Diagnosis     Chronic ischemic heart disease     Gout     HYPERLIPIDEMIA LDL GOAL <100     Advanced directives, counseling/discussion     Mild major depression (H)     S/P AVR (aortic valve replacement)     Transient hyperglycemia post procedure     Anemia due to blood loss, acute     Delirium     Hypertension goal BP (blood pressure) < 140/90     Essential hypertension with goal blood pressure less than 140/90     Major depressive disorder, recurrent episode, mild (H)     Chronic atrial fibrillation (H)     Pain of left sacroiliac joint     Lumbar radiculopathy     Past Medical History:   Diagnosis Date     Anxiety state, unspecified      Atrial fibrillation (H)      Bell's palsy 12/12/1997     Bioprosthetic aortic valve replacement during current hospitalization      Coronary atherosclerosis of unspecified type of vessel, native or graft     coronary artery disease with history of MI and stent placement      Gout, unspecified      Hydrocephalus 2015     Old myocardial infarction 3/1998    Hx of MI     Osteoarthrosis, unspecified whether generalized or localized, unspecified site     Diffuse migratory arthritis     Other and unspecified hyperlipidemia  "     Paroxysmal supraventricular tachycardia (H)     Hx of PAT     Pure hypercholesterolemia      Stented coronary artery      Past Surgical History:   Procedure Laterality Date     C EXPLORATORY OF ABDOMEN  1/25/2007    Exploratory laparotomy.  Lysis of adhesions with reduction of internal hernia.     C ROTATOR CUFF STRAP      s/p rotator cuff surgery     COLONOSCOPY N/A 12/5/2016    Procedure: COMBINED COLONOSCOPY, SINGLE OR MULTIPLE BIOPSY/POLYPECTOMY BY BIOPSY;  Surgeon: Benjamin Cavazos MD;  Location: [facility]     HC COLONOSCOPY THRU STOMA, DIAGNOSTIC  8/23/2004     HC KNEE SCOPE,MED/LAT MENISCUS REPAIR       HC REMOVE TONSILS/ADENOIDS,<11 Y/O      unsure of age     Hydrocephalus  2015    shunt placed     INJECT EPIDURAL LUMBAR N/A 6/8/2017    Procedure: INJECT EPIDURAL LUMBAR;  lumbar epidural steroid injection Left Lumbar 5 to sacral 1;  Surgeon: Pawan Davenport MD;  Location: [facility] OR     REMOVAL OF SPERM DUCT(S)      Vasectomy     REPLACE VALVE AORTIC N/A 9/14/2015    Procedure: REPLACE VALVE AORTIC;  Surgeon: Sang Chan MD;  Location:  OR       Social History:   reports that he has quit smoking. He has never used smokeless tobacco. He reports that he drinks about 1.8 - 2.4 oz of alcohol per week  He reports that he does not use illicit drugs. Spends allen in [place], [place].  with children, grandchildren, and great grandchildren.    Family History:  Family History   Problem Relation Age of Onset     Cerebrovascular Disease Mother      Cerebrovascular Disease Father      Hypertension Father      C.A.D. Sister      C.A.D. Brother      Diabetes Maternal Aunt      Prostate Cancer Brother      Cancer - colorectal No family hx of        Medications:  Current Outpatient Prescriptions   Medication Sig Dispense Refill     acetaminophen 650 MG TABS Take 650 mg by mouth every 6 hours 100 tablet      allopurinol (ZYLOPRIM) 100 MG tablet Take 1 tablet (100 mg) by mouth 2 times daily 180 tablet 3      apixaban ANTICOAGULANT (ELIQUIS) 5 MG tablet Take 1 tablet (5 mg) by mouth 2 times daily 180 tablet 3     aspirin EC 81 MG EC tablet Take 1 tablet (81 mg) by mouth daily       atorvastatin (LIPITOR) 40 MG tablet Take 1 tablet (40 mg) by mouth daily 90 tablet 3     diltiazem (DILACOR XR) 120 MG 24 hr capsule Take 1 capsule (120 mg) by mouth daily 90 capsule 3     finasteride (PROSCAR) 5 MG tablet Take 1 tablet (5 mg) by mouth daily 90 tablet 3     FLUoxetine (PROZAC) 20 MG capsule Take 3 capsules (60 mg) by mouth daily 270 capsule 3     furosemide (LASIX) 20 MG tablet Take 1 tablet (20 mg) by mouth daily 90 tablet 1     HYDROcodone-acetaminophen (NORCO) 5-325 MG per tablet Take 1 tablet by mouth every 4 hours as needed for pain maximum 10 tablet(s) per day (Patient taking differently: Take 1 tablet by mouth as needed for pain maximum 10 tablet(s) per day) 12 tablet 0     metoprolol (LOPRESSOR) 50 MG tablet Take 1 tablet (50 mg) by mouth 2 times daily 180 tablet 3     nitroglycerin (NITROSTAT) 0.4 MG SL tablet Place 1 tablet (0.4 mg) under the tongue every 5 minutes as needed for chest pain 25 tablet 4     polyethylene glycol (MIRALAX/GLYCOLAX) packet Take 17 g by mouth daily 7 packet        Allergies   Allergen Reactions     Seroquel [Quetiapine] Other (See Comments)     Felt funny       Review of Systems:  -Constitutional: Patient is otherwise feeling well, in usual state of health.   -Skin: As above in HPI. No additional skin concerns.      Physical exam:  Vitals: There were no vitals taken for this visit.  GEN: This is a well developed, well-nourished male in no acute distress, in a pleasant mood.    SKIN: Waist-up skin, which includes the head/face, neck, both arms, chest, back, abdomen, digits and/or nails was examined.  -Barnett type II skin  -Moderate dermatoheliosis.  -There are erythematous macules with overyling adherent scale on the right preauricular cheek x 3, right medial cheek, nasal bridge,  left temple x 2, left lateral brow, left cheek x 2, and left preauricular cheek x1.  -Pink firm papules on the right side of chest and right upper arm   -There are waxy stuck on tan to brown papules on the back.  -There are dome shaped bright red papules on the back.   -Scattered brown macules on sun exposed areas.  -No other areas of concern on examination.     Impression/Plan:  1. Sun damaged skin with solar lentigines - moderate to severe sundamage on examination today.    Sun precaution was advised including the use of sun screens of SPF 30 or higher, sun protective clothing, and avoidance of tanning beds.    2. 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     Patient reassured of the benign nature of these lesions.    3. Seborrheic keratosis, cherry angiomas - back    No further intervention required.     Patient reassured of the benign nature of these lesions.    4. Actinic keratosis - right preauricular cheek x 3,  right medial cheek, nasal bridge, left temple x 2, left lateral brow, left cheek x 2, and left preauricular cheek  Cryotherapy procedure note: After verbal consent and discussion of risks and benefits including but no limited to dyspigmentation/scar, blister, and pain, 11 was(were) treated with 1-2mm freeze border for 2 cycles with liquid nitrogen. Post cryotherapy instructions were provided.   Will have patient return for recheck when he returns from winter in [place].  Plan to discuss field treatment at next visit.      CC Dr. Richi Alvarez on close of this encounter.  Follow-up 6 months (April).       Staff Involved:  Scribe/Staff      Scribe Disclosure  I, Elaine Wu, am serving as a scribe to document services personally performed by Dr. Jana Trinidad MD, based on data collection and the provider's statements to me.     Provider Disclosure:   The documentation recorded by the scribe accurately reflects the services I personally performed  and the decisions made by me.    Jana Trinidad MD    Department of Dermatology  [facility] [place]: Phone: [phone], Fax:[phone]  [facility]: Phone: [phone], Fax: [phone]

## 2018-09-27 NOTE — PATIENT INSTRUCTIONS
Cryotherapy    What is it?    Use of a very cold liquid, such as liquid nitrogen, to freeze and destroy abnormal skin cells that need to be removed    What should I expect?    Tenderness and redness    A small blister that might grow and fill with dark purple blood. There may be crusting.    More than one treatment may be needed if the lesions do not go away.    How do I care for the treated area?    Gently wash the area with your hands when bathing.    Use a thin layer of Vaseline to help with healing. You may use a Band-Aid.     The area should heal within 7-10 days and may leave behind a pink or lighter color.     Do not use an antibiotic or Neosporin ointment.     You may take acetaminophen (Tylenol) for pain.     Call your Doctor if you have:    Severe pain    Signs of infection (warmth, redness, cloudy yellow drainage, and or a bad smell)    Questions or concerns    Who should I call with questions?       [facility]: [phone]       [facility]: [phone]       For urgent needs outside of business hours call the [facility] at [phone]        and ask for the dermatology resident on call

## 2018-10-22 DIAGNOSIS — E78.5 HYPERLIPIDEMIA LDL GOAL <100: ICD-10-CM

## 2018-10-22 RX ORDER — ATORVASTATIN CALCIUM 40 MG/1
40 TABLET, FILM COATED ORAL DAILY
Qty: 90 TABLET | Refills: 0 | Status: SHIPPED | OUTPATIENT
Start: 2018-10-22 | End: 2018-10-25

## 2018-10-25 DIAGNOSIS — E78.5 HYPERLIPIDEMIA LDL GOAL <100: ICD-10-CM

## 2018-10-25 RX ORDER — ATORVASTATIN CALCIUM 40 MG/1
40 TABLET, FILM COATED ORAL DAILY
Qty: 90 TABLET | Refills: 3 | Status: SHIPPED | OUTPATIENT
Start: 2018-10-25 | End: 2018-11-16

## 2018-11-16 ENCOUNTER — OFFICE VISIT (OUTPATIENT)
Dept: INTERNAL MEDICINE | Facility: CLINIC | Age: 78
End: 2018-11-16
Payer: MEDICARE

## 2018-11-16 VITALS
BODY MASS INDEX: 34.8 KG/M2 | DIASTOLIC BLOOD PRESSURE: 80 MMHG | RESPIRATION RATE: 24 BRPM | SYSTOLIC BLOOD PRESSURE: 136 MMHG | OXYGEN SATURATION: 97 % | WEIGHT: 235 LBS | TEMPERATURE: 96.6 F | HEART RATE: 74 BPM | HEIGHT: 69 IN

## 2018-11-16 DIAGNOSIS — Z23 NEED FOR PROPHYLACTIC VACCINATION AND INOCULATION AGAINST INFLUENZA: ICD-10-CM

## 2018-11-16 DIAGNOSIS — Z00.00 MEDICARE ANNUAL WELLNESS VISIT, SUBSEQUENT: Primary | ICD-10-CM

## 2018-11-16 DIAGNOSIS — N40.1 BENIGN PROSTATIC HYPERPLASIA WITH LOWER URINARY TRACT SYMPTOMS, SYMPTOM DETAILS UNSPECIFIED: ICD-10-CM

## 2018-11-16 DIAGNOSIS — Z23 NEED FOR VACCINATION: ICD-10-CM

## 2018-11-16 DIAGNOSIS — M10.9 ACUTE GOUTY ARTHRITIS: ICD-10-CM

## 2018-11-16 DIAGNOSIS — E78.5 HYPERLIPIDEMIA LDL GOAL <100: ICD-10-CM

## 2018-11-16 DIAGNOSIS — Z95.2 S/P AVR: ICD-10-CM

## 2018-11-16 DIAGNOSIS — I10 ESSENTIAL HYPERTENSION WITH GOAL BLOOD PRESSURE LESS THAN 140/90: ICD-10-CM

## 2018-11-16 LAB
ALBUMIN SERPL-MCNC: 3.6 G/DL (ref 3.4–5)
ALP SERPL-CCNC: 97 U/L (ref 40–150)
ALT SERPL W P-5'-P-CCNC: 34 U/L (ref 0–70)
ANION GAP SERPL CALCULATED.3IONS-SCNC: 7 MMOL/L (ref 3–14)
AST SERPL W P-5'-P-CCNC: 19 U/L (ref 0–45)
BILIRUB SERPL-MCNC: 0.5 MG/DL (ref 0.2–1.3)
BUN SERPL-MCNC: 24 MG/DL (ref 7–30)
CALCIUM SERPL-MCNC: 8.9 MG/DL (ref 8.5–10.1)
CHLORIDE SERPL-SCNC: 107 MMOL/L (ref 94–109)
CHOLEST SERPL-MCNC: 139 MG/DL
CO2 SERPL-SCNC: 28 MMOL/L (ref 20–32)
CREAT SERPL-MCNC: 1.35 MG/DL (ref 0.66–1.25)
ERYTHROCYTE [DISTWIDTH] IN BLOOD BY AUTOMATED COUNT: 13.7 % (ref 10–15)
GFR SERPL CREATININE-BSD FRML MDRD: 51 ML/MIN/1.7M2
GLUCOSE SERPL-MCNC: 103 MG/DL (ref 70–99)
HCT VFR BLD AUTO: 45.9 % (ref 40–53)
HDLC SERPL-MCNC: 48 MG/DL
HGB BLD-MCNC: 14.7 G/DL (ref 13.3–17.7)
LDLC SERPL CALC-MCNC: 70 MG/DL
MCH RBC QN AUTO: 31 PG (ref 26.5–33)
MCHC RBC AUTO-ENTMCNC: 32 G/DL (ref 31.5–36.5)
MCV RBC AUTO: 97 FL (ref 78–100)
NONHDLC SERPL-MCNC: 91 MG/DL
PLATELET # BLD AUTO: 172 10E9/L (ref 150–450)
POTASSIUM SERPL-SCNC: 4.5 MMOL/L (ref 3.4–5.3)
PROT SERPL-MCNC: 7.8 G/DL (ref 6.8–8.8)
RBC # BLD AUTO: 4.74 10E12/L (ref 4.4–5.9)
SODIUM SERPL-SCNC: 142 MMOL/L (ref 133–144)
TRIGL SERPL-MCNC: 105 MG/DL
WBC # BLD AUTO: 8.2 10E9/L (ref 4–11)

## 2018-11-16 PROCEDURE — 90662 IIV NO PRSV INCREASED AG IM: CPT | Performed by: INTERNAL MEDICINE

## 2018-11-16 PROCEDURE — 36415 COLL VENOUS BLD VENIPUNCTURE: CPT | Performed by: INTERNAL MEDICINE

## 2018-11-16 PROCEDURE — G0439 PPPS, SUBSEQ VISIT: HCPCS | Performed by: INTERNAL MEDICINE

## 2018-11-16 PROCEDURE — 80061 LIPID PANEL: CPT | Performed by: INTERNAL MEDICINE

## 2018-11-16 PROCEDURE — 90472 IMMUNIZATION ADMIN EACH ADD: CPT | Performed by: INTERNAL MEDICINE

## 2018-11-16 PROCEDURE — 80053 COMPREHEN METABOLIC PANEL: CPT | Performed by: INTERNAL MEDICINE

## 2018-11-16 PROCEDURE — 85027 COMPLETE CBC AUTOMATED: CPT | Performed by: INTERNAL MEDICINE

## 2018-11-16 PROCEDURE — G0008 ADMIN INFLUENZA VIRUS VAC: HCPCS | Mod: 59 | Performed by: INTERNAL MEDICINE

## 2018-11-16 PROCEDURE — 90715 TDAP VACCINE 7 YRS/> IM: CPT | Performed by: INTERNAL MEDICINE

## 2018-11-16 RX ORDER — ATORVASTATIN CALCIUM 40 MG/1
40 TABLET, FILM COATED ORAL DAILY
Qty: 90 TABLET | Refills: 3 | Status: SHIPPED | OUTPATIENT
Start: 2018-11-16 | End: 2019-11-18

## 2018-11-16 RX ORDER — DILTIAZEM HYDROCHLORIDE 120 MG/1
120 CAPSULE, EXTENDED RELEASE ORAL DAILY
Qty: 90 CAPSULE | Refills: 3 | Status: SHIPPED | OUTPATIENT
Start: 2018-11-16 | End: 2019-06-19

## 2018-11-16 RX ORDER — ALLOPURINOL 100 MG/1
100 TABLET ORAL 2 TIMES DAILY
Qty: 180 TABLET | Refills: 3 | Status: SHIPPED | OUTPATIENT
Start: 2018-11-16 | End: 2019-11-18

## 2018-11-16 RX ORDER — FINASTERIDE 5 MG/1
5 TABLET, FILM COATED ORAL DAILY
Qty: 90 TABLET | Refills: 3 | Status: SHIPPED | OUTPATIENT
Start: 2018-11-16 | End: 2019-11-14

## 2018-11-16 RX ORDER — FUROSEMIDE 20 MG
20 TABLET ORAL DAILY
Qty: 90 TABLET | Refills: 3 | Status: SHIPPED | OUTPATIENT
Start: 2018-11-16 | End: 2019-11-18

## 2018-11-16 RX ORDER — METOPROLOL TARTRATE 50 MG
50 TABLET ORAL 2 TIMES DAILY
Qty: 180 TABLET | Refills: 3 | Status: SHIPPED | OUTPATIENT
Start: 2018-11-16 | End: 2019-07-17 | Stop reason: ALTCHOICE

## 2018-11-16 ASSESSMENT — ENCOUNTER SYMPTOMS
HEARTBURN: 0
DIARRHEA: 1
HEMATURIA: 0
FEVER: 0
SORE THROAT: 0
WEAKNESS: 1
NERVOUS/ANXIOUS: 0
ABDOMINAL PAIN: 0
MYALGIAS: 1
ARTHRALGIAS: 1
SHORTNESS OF BREATH: 1
JOINT SWELLING: 0
NAUSEA: 0
PARESTHESIAS: 0
CONSTIPATION: 1
EYE PAIN: 0
DIZZINESS: 1
PALPITATIONS: 0
HEMATOCHEZIA: 0
HEADACHES: 1
COUGH: 1
CHILLS: 0
DYSURIA: 0

## 2018-11-16 ASSESSMENT — PATIENT HEALTH QUESTIONNAIRE - PHQ9
10. IF YOU CHECKED OFF ANY PROBLEMS, HOW DIFFICULT HAVE THESE PROBLEMS MADE IT FOR YOU TO DO YOUR WORK, TAKE CARE OF THINGS AT HOME, OR GET ALONG WITH OTHER PEOPLE: SOMEWHAT DIFFICULT
SUM OF ALL RESPONSES TO PHQ QUESTIONS 1-9: 7
SUM OF ALL RESPONSES TO PHQ QUESTIONS 1-9: 7

## 2018-11-16 ASSESSMENT — ACTIVITIES OF DAILY LIVING (ADL): CURRENT_FUNCTION: NO ASSISTANCE NEEDED

## 2018-11-16 ASSESSMENT — PAIN SCALES - GENERAL: PAINLEVEL: MODERATE PAIN (5)

## 2018-11-16 NOTE — NURSING NOTE
Screening Questionnaire for Adult Immunization    Are you sick today?   Yes   Do you have allergies to medications, food, a vaccine component or latex?   No   Have you ever had a serious reaction after receiving a vaccination?   No   Do you have a long-term health problem with heart disease, lung disease, asthma, kidney disease, metabolic disease (e.g. diabetes), anemia, or other blood disorder?   Yes   Do you have cancer, leukemia, HIV/AIDS, or any other immune system problem?   No   In the past 3 months, have you taken medications that affect  your immune system, such as prednisone, other steroids, or anticancer drugs; drugs for the treatment of rheumatoid arthritis, Crohn s disease, or psoriasis; or have you had radiation treatments?   No   Have you had a seizure, or a brain or other nervous system problem?   No   During the past year, have you received a transfusion of blood or blood     products, or been given immune (gamma) globulin or antiviral drug?   No   For women: Are you pregnant or is there a chance you could become        pregnant during the next month?   No   Have you received any vaccinations in the past 4 weeks?   No     Immunization questionnaire was positive for at least one answer.   gave order to give vaccine.       Patient instructed to remain in clinic for 15 minutes afterwards, and to report any adverse reaction to me immediately.       Screening performed by Helene Sorto on 11/16/2018 at 9:14 AM.

## 2018-11-16 NOTE — PATIENT INSTRUCTIONS
Preventive Health Recommendations:     See your health care provider every year to    Review health changes.     Discuss preventive care.      Review your medicines if your doctor has prescribed any.    Talk with your health care provider about whether you should have a test to screen for prostate cancer (PSA).    Every 3 years, have a diabetes test (fasting glucose). If you are at risk for diabetes, you should have this test more often.    Every 5 years, have a cholesterol test. Have this test more often if you are at risk for high cholesterol or heart disease.     Every 10 years, have a colonoscopy. Or, have a yearly FIT test (stool test). These exams will check for colon cancer.    Talk to with your health care provider about screening for Abdominal Aortic Aneurysm if you have a family history of AAA or have a history of smoking.  Shots:     Get a flu shot each year.     Get a tetanus shot every 10 years.     Talk to your doctor about your pneumonia vaccines. There are now two you should receive - Pneumovax (PPSV 23) and Prevnar (PCV 13).    Talk to your pharmacist about a shingles vaccine.     Talk to your doctor about the hepatitis B vaccine.  Nutrition:     Eat at least 5 servings of fruits and vegetables each day.     Eat whole-grain bread, whole-wheat pasta and brown rice instead of white grains and rice.     Get adequate Calcium and Vitamin D.   Lifestyle    Exercise for at least 150 minutes a week (30 minutes a day, 5 days a week). This will help you control your weight and prevent disease.     Limit alcohol to one drink per day.     No smoking.     Wear sunscreen to prevent skin cancer.     See your dentist every six months for an exam and cleaning.     See your eye doctor every 1 to 2 years to screen for conditions such as glaucoma, macular degeneration and cataracts.    Personalized Prevention Plan  You are due for the preventive services outlined below.  Your care team is available to assist you in  scheduling these services.  If you have already completed any of these items, please share that information with your care team to update in your medical record.    Health Maintenance Due   Topic Date Due     INR CLINIC REFERRAL - yearly  08/19/2015     Tetanus Vaccine - every 10 years  04/18/2017     Flu Vaccine (1) 09/01/2018     Cholesterol Lab - yearly  11/14/2018     Medicare Annual Wellness Visit  11/14/2018     FALL RISK ASSESSMENT  11/14/2018     Depression Assessment - every 6 months  11/16/2018

## 2018-11-16 NOTE — NURSING NOTE
Prior to injection verified patient identity using patient's name and date of birth.   Patient instructed to remain in clinic for 20 minutes afterwards, and to report any adverse reaction to me immediately.  Helene Sorto MA

## 2018-11-16 NOTE — PROGRESS NOTES
"SUBJECTIVE:   Endy Navarro is a 78 year old male who presents for Preventive Visit    Are you in the first 12 months of your Medicare coverage?  No    Annual Wellness Visit     In general, how would you rate your overall health?  Fair    Frequency of exercise:  1 day/week    Duration of exercise:  Less than 15 minutes    Do you usually eat at least 4 servings of fruit and vegetables a day, include whole grains    & fiber and avoid regularly eating high fat or \"junk\" foods?  No    Taking medications regularly:  Yes    Medication side effects:  None    Ability to successfully perform activities of daily living:  No assistance needed    Home Safety:  No safety concerns identified    Hearing Impairment:  Difficulty following a conversation in a noisy restaurant or crowded room, need to ask people to speak up or repeat themselves and difficulty understanding soft or whispered speech    In the past 6 months, have you been bothered by leaking of urine?  No    In general, how would you rate your overall mental or emotional health?  Fair    PHQ-2 Total Score: 3    Additional concerns today:  Yes    List of questions today.  Some pain over the  shunt worse if he pushes on it.  Was evaluated by neurosurgery and a head ct in the summer.  Left shoulder feels weak, back hurts, legs can hurt.    Needs his cane and hard to walk straight.      Fall risk:      COGNITIVE SCREEN  1) Repeat 3 items (Leader, Season, Table)      2) Clock draw:   ABNORMAL  3) 3 item recall: Recalls 1 object   Results: ABNORMAL clock, 1-2 items recalled: PROBABLE COGNITIVE IMPAIRMENT, **INFORM PROVIDER**    Mini-CogTM Copyright ANTIONETTE Albright. Licensed by the author for use in [facility]; reprinted with permission (benjamin@.[facility]). All rights reserved.        Reviewed and updated as needed this visit by clinical staff         Reviewed and updated as needed this visit by Provider        Social History   Substance Use Topics     Smoking status: " Former Smoker     Smokeless tobacco: Never Used      Comment: quit 25 yr ago     Alcohol use 1.8 - 2.4 oz/week     3 - 4 Standard drinks or equivalent per week      Comment: couple times a week       Alcohol Use 11/16/2018   If you drink alcohol do you typically have greater than 3 drinks per day OR greater than 7 drinks per week? No         Do you feel safe in your environment - Yes    Do you have a Health Care Directive?: Yes: Advance Directive has been received and scanned.    Current providers sharing in care for this patient include:   Patient Care Team:  Richi Alvarez MD as PCP - General (Internal Medicine)    The following health maintenance items are reviewed in Epic and correct as of today:  Health Maintenance   Topic Date Due     OP ANNUAL INR REFERRAL  08/19/2015     TETANUS IMMUNIZATION (SYSTEM ASSIGNED)  04/18/2017     INFLUENZA VACCINE (1) 09/01/2018     LIPID MONITORING Q1 YEAR  11/14/2018     MEDICARE ANNUAL WELLNESS VISIT  11/14/2018     FALL RISK ASSESSMENT  11/14/2018     PHQ-9 Q6 MONTHS  11/16/2018     DEPRESSION ACTION PLAN Q1 YR  05/16/2019     ADVANCE DIRECTIVE PLANNING Q5 YRS  06/06/2022     PNEUMOCOCCAL  Completed         Pneumonia Vaccine:already done    Review of Systems   Constitutional: Negative for chills and fever.   HENT: Positive for congestion and hearing loss. Negative for ear pain and sore throat.    Eyes: Negative for pain and visual disturbance.   Respiratory: Positive for cough and shortness of breath.    Cardiovascular: Negative for chest pain, palpitations and peripheral edema.   Gastrointestinal: Positive for constipation and diarrhea. Negative for abdominal pain, heartburn, hematochezia and nausea.   Genitourinary: Positive for impotence and urgency. Negative for discharge, dysuria, genital sores and hematuria.   Musculoskeletal: Positive for arthralgias and myalgias. Negative for joint swelling.   Skin: Negative for rash.   Neurological: Positive for dizziness, weakness  "and headaches. Negative for paresthesias.   Psychiatric/Behavioral: Positive for mood changes. The patient is not nervous/anxious.          OBJECTIVE:   /80  Pulse 74  Temp 96.6  F (35.9  C) (Temporal)  Resp 24  Ht 5' 9\" (1.753 m)  Wt 235 lb (106.6 kg)  SpO2 97%  BMI 34.7 kg/m2 Estimated body mass index is 35.15 kg/(m^2) as calculated from the following:    Height as of 8/2/18: 5' 9\" (1.753 m).    Weight as of 8/2/18: 238 lb (108 kg).  Physical Exam  GENERAL: healthy, alert and no distress  EYES: Eyes grossly normal to inspection, PERRL and conjunctivae and sclerae normal  HENT: ear canals and TM's normal, nose and mouth without ulcers or lesions  NECK: no adenopathy, no asymmetry, masses, or scars and thyroid normal to palpation  RESP: lungs clear to auscultation - no rales, rhonchi or wheezes  CV: irregularly irregular rhythm, normal S1 S2, no S3 or S4, grade 2/6 systolic murmur heard best over the base, peripheral pulses strong and no peripheral edema  ABDOMEN: soft, nontender, no hepatosplenomegaly, no masses and bowel sounds normal  MS: no gross musculoskeletal defects noted, no edema  SKIN: no suspicious lesions or rashes  NEURO: Normal strength and tone, mentation intact and speech normal  PSYCH: mentation appears normal, affect normal/bright      ASSESSMENT / PLAN:       ICD-10-CM    1. Benign prostatic hyperplasia with lower urinary tract symptoms, symptom details unspecified N40.1 finasteride (PROSCAR) 5 MG tablet   2. Acute gouty arthritis M10.9 allopurinol (ZYLOPRIM) 100 MG tablet   3. S/P AVR Z95.2 apixaban ANTICOAGULANT (ELIQUIS) 5 MG tablet     diltiazem (DILACOR XR) 120 MG 24 hr capsule     FLUoxetine (PROZAC) 20 MG capsule     metoprolol tartrate (LOPRESSOR) 50 MG tablet     CBC with platelets   4. Hyperlipidemia LDL goal <100 E78.5 atorvastatin (LIPITOR) 40 MG tablet     Comprehensive metabolic panel     Lipid Profile   5. Essential hypertension with goal blood pressure less than " "140/90 I10 furosemide (LASIX) 20 MG tablet     Comprehensive metabolic panel     Lipid Profile   6. Medicare annual wellness visit, subsequent Z00.00      Going to [place] for the winter, will do labs.  Can try to reduce statin in half for muscle pain, chronic joint pains.     End of Life Planning:  Patient currently has an advanced directive: Yes.  Practitioner is supportive of decision.    COUNSELING:  Reviewed preventive health counseling, as reflected in patient instructions       Regular exercise       Healthy diet/nutrition       Immunizations    Vaccinated for: Influenza and TDAP          BP Readings from Last 1 Encounters:   08/02/18 119/77     Estimated body mass index is 35.15 kg/(m^2) as calculated from the following:    Height as of 8/2/18: 5' 9\" (1.753 m).    Weight as of 8/2/18: 238 lb (108 kg).      Weight management plan: Discussed healthy diet and exercise guidelines and patient will follow up in 12 months in clinic to re-evaluate.     reports that he has quit smoking. He has never used smokeless tobacco.      Appropriate preventive services were discussed with this patient, including applicable screening as appropriate for cardiovascular disease, diabetes, osteopenia/osteoporosis, and glaucoma.  As appropriate for age/gender, discussed screening for colorectal cancer, prostate cancer, breast cancer, and cervical cancer. Checklist reviewing preventive services available has been given to the patient.    Reviewed patients plan of care and provided an AVS. The Basic Care Plan (routine screening as documented in Health Maintenance) for Endy meets the Care Plan requirement. This Care Plan has been established and reviewed with the Patient and spouse.    Counseling Resources:  ATP IV Guidelines  Pooled Cohorts Equation Calculator  Breast Cancer Risk Calculator  FRAX Risk Assessment  ICSI Preventive Guidelines  Dietary Guidelines for Americans, 2010  USDA's MyPlate  ASA Prophylaxis  Lung CA " Screening    Richi Alvarez MD  [facility]  Answers for HPI/ROS submitted by the patient on 11/16/2018   If you checked off any problems, how difficult have these problems made it for you to do your work, take care of things at home, or get along with other people?: Somewhat difficult  PHQ9 TOTAL SCORE: 7

## 2018-11-16 NOTE — MR AVS SNAPSHOT
After Visit Summary   11/16/2018    Endy Navarro    MRN: [id number]           Patient Information     Date Of Birth          1940        Visit Information        Provider Department      11/16/2018 7:00 AM Richi Alvarez MD [facility]        Today's Diagnoses     Medicare annual wellness visit, subsequent    -  1    Acute gouty arthritis        S/P AVR        Hyperlipidemia LDL goal <100        Essential hypertension with goal blood pressure less than 140/90        Benign prostatic hyperplasia with lower urinary tract symptoms, symptom details unspecified          Care Instructions      Preventive Health Recommendations:     See your health care provider every year to    Review health changes.     Discuss preventive care.      Review your medicines if your doctor has prescribed any.    Talk with your health care provider about whether you should have a test to screen for prostate cancer (PSA).    Every 3 years, have a diabetes test (fasting glucose). If you are at risk for diabetes, you should have this test more often.    Every 5 years, have a cholesterol test. Have this test more often if you are at risk for high cholesterol or heart disease.     Every 10 years, have a colonoscopy. Or, have a yearly FIT test (stool test). These exams will check for colon cancer.    Talk to with your health care provider about screening for Abdominal Aortic Aneurysm if you have a family history of AAA or have a history of smoking.  Shots:     Get a flu shot each year.     Get a tetanus shot every 10 years.     Talk to your doctor about your pneumonia vaccines. There are now two you should receive - Pneumovax (PPSV 23) and Prevnar (PCV 13).    Talk to your pharmacist about a shingles vaccine.     Talk to your doctor about the hepatitis B vaccine.  Nutrition:     Eat at least 5 servings of fruits and vegetables each day.     Eat whole-grain bread, whole-wheat pasta and brown rice instead of  white grains and rice.     Get adequate Calcium and Vitamin D.   Lifestyle    Exercise for at least 150 minutes a week (30 minutes a day, 5 days a week). This will help you control your weight and prevent disease.     Limit alcohol to one drink per day.     No smoking.     Wear sunscreen to prevent skin cancer.     See your dentist every six months for an exam and cleaning.     See your eye doctor every 1 to 2 years to screen for conditions such as glaucoma, macular degeneration and cataracts.    Personalized Prevention Plan  You are due for the preventive services outlined below.  Your care team is available to assist you in scheduling these services.  If you have already completed any of these items, please share that information with your care team to update in your medical record.    Health Maintenance Due   Topic Date Due     INR CLINIC REFERRAL - yearly  08/19/2015     Tetanus Vaccine - every 10 years  04/18/2017     Flu Vaccine (1) 09/01/2018     Cholesterol Lab - yearly  11/14/2018     Medicare Annual Wellness Visit  11/14/2018     FALL RISK ASSESSMENT  11/14/2018     Depression Assessment - every 6 months  11/16/2018             Follow-ups after your visit        Follow-up notes from your care team     Return in about 1 year (around 11/16/2019) for Physical Exam.      Your next 10 appointments already scheduled     May 07, 2019  1:00 PM CDT   Return Visit with Jana Trinidad MD   [facility]    [address] [place] [id number] [phone]              Who to contact     If you have questions or need follow up information about today's clinic visit or your schedule please contact [facility] directly at [phone].  Normal or non-critical lab and imaging results will be communicated to you by MyChart, letter or phone within 4 business days after the clinic has received the results. If you do not hear from us within 7 days,  "please contact the clinic through [employer] or phone. If you have a critical or abnormal lab result, we will notify you by phone as soon as possible.  Submit refill requests through [employer] or call your pharmacy and they will forward the refill request to us. Please allow 3 business days for your refill to be completed.          Additional Information About Your Visit        [employer] Information     [employer] gives you secure access to your electronic health record. If you see a primary care provider, you can also send messages to your care team and make appointments. If you have questions, please call your primary care clinic.  If you do not have a primary care provider, please call [phone] and they will assist you.        Care EveryWhere ID     This is your Care EveryWhere ID. This could be used by other organizations to access your [facility] medical records  [id number]        Your Vitals Were     Pulse Temperature Respirations Height Pulse Oximetry BMI (Body Mass Index)    74 96.6  F (35.9  C) (Temporal) 24 5' 9\" (1.753 m) 97% 34.7 kg/m2       Blood Pressure from Last 3 Encounters:   11/16/18 136/80   08/02/18 119/77   07/18/18 154/86    Weight from Last 3 Encounters:   11/16/18 235 lb (106.6 kg)   08/02/18 238 lb (108 kg)   07/18/18 240 lb 15.4 oz (109.3 kg)              We Performed the Following     CBC with platelets     Comprehensive metabolic panel     Lipid Profile          Where to get your medicines      These medications were sent to [address] - [address]  [address], [facility] [address]     Phone:  [phone]     allopurinol 100 MG tablet    apixaban ANTICOAGULANT 5 MG tablet    atorvastatin 40 MG tablet    diltiazem 120 MG 24 hr capsule    finasteride 5 MG tablet    FLUoxetine 20 MG capsule    furosemide 20 MG tablet    metoprolol tartrate 50 MG tablet          Primary Care Provider Office Phone # Fax #    Richi Alvarez [phone]       [address]  [facility] [address]        Equal Access to Services     SHAUNNA VALENZUELA : Hadii aad ku hadgroversamir Sourbanoali, waaxda luqadaha, qaybta kaalmakavya aviles. So [facility] [phone].    ATENCIÓN: Si habla español, tiene a leon disposición servicios gratuitos de asistencia lingüística. Satnam al [phone].    We comply with applicable federal civil rights laws and [place] laws. We do not discriminate on the basis of race, color, national origin, age, disability, sex, sexual orientation, or gender identity.            Thank you!     Thank you for choosing [facility]  for your care. Our goal is always to provide you with excellent care. Hearing back from our patients is one way we can continue to improve our services. Please take a few minutes to complete the written survey that you may receive in the mail after your visit with us. Thank you!             Your Updated Medication List - Protect others around you: Learn how to safely use, store and throw away your medicines at www.disposemymeds.org.          This list is accurate as of 11/16/18  7:57 AM.  Always use your most recent med list.                   Brand Name Dispense Instructions for use Diagnosis    acetaminophen 650 MG 8 hour tablet     100 tablet    Take 650 mg by mouth every 6 hours    S/P AVR       allopurinol 100 MG tablet    ZYLOPRIM    180 tablet    Take 1 tablet (100 mg) by mouth 2 times daily    Acute gouty arthritis       apixaban ANTICOAGULANT 5 MG tablet    ELIQUIS    180 tablet    Take 1 tablet (5 mg) by mouth 2 times daily    S/P AVR       aspirin 81 MG EC tablet      Take 1 tablet (81 mg) by mouth daily    S/P AVR       atorvastatin 40 MG tablet    LIPITOR    90 tablet    Take 1 tablet (40 mg) by mouth daily    Hyperlipidemia LDL goal <100       diltiazem 120 MG 24 hr capsule    DILACOR XR    90 capsule    Take 1 capsule (120 mg) by mouth daily    S/P AVR       finasteride 5 MG tablet     PROSCAR    90 tablet    Take 1 tablet (5 mg) by mouth daily    Benign prostatic hyperplasia with lower urinary tract symptoms, symptom details unspecified       FLUoxetine 20 MG capsule    PROzac    270 capsule    Take 3 capsules (60 mg) by mouth daily    S/P AVR       furosemide 20 MG tablet    LASIX    90 tablet    Take 1 tablet (20 mg) by mouth daily    Essential hypertension with goal blood pressure less than 140/90       metoprolol tartrate 50 MG tablet    LOPRESSOR    180 tablet    Take 1 tablet (50 mg) by mouth 2 times daily    S/P AVR       nitroGLYcerin 0.4 MG sublingual tablet    NITROSTAT    25 tablet    Place 1 tablet (0.4 mg) under the tongue every 5 minutes as needed for chest pain    Coronary artery disease involving native coronary artery of native heart without angina pectoris       polyethylene glycol Packet    MIRALAX/GLYCOLAX    7 packet    Take 17 g by mouth daily    S/P AVR

## 2018-11-17 ASSESSMENT — PATIENT HEALTH QUESTIONNAIRE - PHQ9: SUM OF ALL RESPONSES TO PHQ QUESTIONS 1-9: 7

## 2019-02-20 NOTE — PROGRESS NOTES
Outpatient Physical Therapy Discharge Note     Patient: Endy Navarro  : 1940    Beginning/End Dates of Reporting Period:  2018 to 2019    Referring Provider: MYKEL Alonso CNP    Therapy Diagnosis: Impaired balance and gait, low back and L shoulder pain, radiating leg symptoms, all affecting ability to mobilize and safety with ambulation.     Client Self Report: Pt states he is doing better with back and hip pain.  L shoulder still sore with repetitive activities.    Objective Measurements:  Objective Measure: FGA  Details: As of 18: 18/30 - initial was 6/30  Objective Measure: Shoulder AROM  Details: Pain at 90 degrees flexion, however able to get to 115 degrees AROM.  Objective Measure: LE mobility/strength  Details: 5/5 MMT of the B LE, except L gluteal strength 4+/5  Objective Measure: TUG  Details: As of 18: 17.74 seconds, initial was 24.42 seconds    Goals:  Goal Identifier #1   Goal Description Pt will be able to walk for 1/2 mile without increased back or leg pain using a walker/cane for safety.   Target Date 18   Date Met  18   Progress:     Goal Identifier #2   Goal Description Pt will demonstrate 4+/5 B LE motions in order to have the strength to negotiate steps and curbs.   Target Date 18   Date Met  18   Progress:     Goal Identifier #3   Goal Description Pt will demonstrate improved FGA score to >15/30 in order to demonstrate improved balance with ambulation and help with safety during weight bearing activities.   Target Date 18   Date Met  18   Progress:     Goal Identifier #4   Goal Description Pt will demonstrate <18 seconds on the TUG in order to demonstrate safety with walking on even surfaces.   Target Date 18   Date Met  18   Progress:     Progress Toward Goals:   Progress this reporting period: Pt has met all therapy goals at this time.  He has demonstrated improvement with walking, stability, and strength.   Pt is able to walk with less risk of falling, stand with more stability, and compliant with HEP allowing him to get stronger.  PT contacted pt 2 weeks after last visit and pt states he continues to notice improvements and will continue with HEP independently.    Plan:  Discharge from therapy.    Discharge:    Reason for Discharge: Patient has met all goals.    Equipment Issued: None    Discharge Plan: Patient to continue home program.    Thank you for your referral.    Matilda Cesar, PT, DPT  [facility]  [phone]

## 2019-02-20 NOTE — ADDENDUM NOTE
Encounter addended by: Matilda Cesar, PT on: 2/20/2019 1:16 PM   Actions taken: Episode resolved, Sign clinical note

## 2019-05-07 ENCOUNTER — OFFICE VISIT (OUTPATIENT)
Dept: DERMATOLOGY | Facility: CLINIC | Age: 79
End: 2019-05-07
Payer: MEDICARE

## 2019-05-07 DIAGNOSIS — D22.9 MULTIPLE BENIGN NEVI: ICD-10-CM

## 2019-05-07 DIAGNOSIS — L57.0 ACTINIC KERATOSIS: ICD-10-CM

## 2019-05-07 DIAGNOSIS — D36.10 NEUROFIBROMA: ICD-10-CM

## 2019-05-07 DIAGNOSIS — L57.8 SUN-DAMAGED SKIN: ICD-10-CM

## 2019-05-07 DIAGNOSIS — L82.1 SEBORRHEIC KERATOSIS: ICD-10-CM

## 2019-05-07 DIAGNOSIS — D18.01 CHERRY ANGIOMA: ICD-10-CM

## 2019-05-07 DIAGNOSIS — L81.4 SOLAR LENTIGO: Primary | ICD-10-CM

## 2019-05-07 PROCEDURE — 99214 OFFICE O/P EST MOD 30 MIN: CPT | Mod: 25 | Performed by: DERMATOLOGY

## 2019-05-07 PROCEDURE — 17003 DESTRUCT PREMALG LES 2-14: CPT | Performed by: DERMATOLOGY

## 2019-05-07 PROCEDURE — 17000 DESTRUCT PREMALG LESION: CPT | Performed by: DERMATOLOGY

## 2019-05-07 ASSESSMENT — PAIN SCALES - GENERAL: PAINLEVEL: MODERATE PAIN (5)

## 2019-05-07 NOTE — LETTER
"    5/7/2019         RE: Endy Navarro  [address] [facility] [address]        Dear Colleague,    Thank you for referring your patient, Endy Navarro, to the [facility]. Please see a copy of my visit note below.    [facility] Dermatology Note      Dermatology Problem List:  1.AKs, s/p cryotherapy  [place]: [place], [place], December - April.     Encounter Date: May 7, 2019    CC:  Chief Complaint   Patient presents with     Skin Check     Area of concern on right cheek. No personal hx of SC.          History of Present Illness:  Mr. Schumacher \"Ho\" NELSON Navarro is a 78 year old male who presents for a follow up for a skin check. Today, he has a lesion of concern on his right cheek. He notes this is rough in texture. It does not hurt or bleed. Patient presents with his wife. Patient recently returned from wintering in [place]. He wore sunscreen sometimes while there this year, but does still enjoy laying out in the backyard to get sun while there. No personal history of skin cancer. No other concerns addressed today.      Past Medical History:   Patient Active Problem List   Diagnosis     Chronic ischemic heart disease     Gout     HYPERLIPIDEMIA LDL GOAL <100     Advanced directives, counseling/discussion     Mild major depression (H)     S/P AVR (aortic valve replacement)     Transient hyperglycemia post procedure     Anemia due to blood loss, acute     Delirium     Hypertension goal BP (blood pressure) < 140/90     Essential hypertension with goal blood pressure less than 140/90     Major depressive disorder, recurrent episode, mild (H)     Chronic atrial fibrillation (H)     Pain of left sacroiliac joint     Lumbar radiculopathy     Past Medical History:   Diagnosis Date     Anxiety state, unspecified      Atrial fibrillation (H)      Bell's palsy 12/12/1997     Bioprosthetic aortic valve replacement during current hospitalization      Coronary atherosclerosis " of unspecified type of vessel, native or graft     coronary artery disease with history of MI and stent placement      Gout, unspecified      Hydrocephalus 2015     Old myocardial infarction 3/1998    Hx of MI     Osteoarthrosis, unspecified whether generalized or localized, unspecified site     Diffuse migratory arthritis     Other and unspecified hyperlipidemia      Paroxysmal supraventricular tachycardia (H)     Hx of PAT     Pure hypercholesterolemia      Stented coronary artery      Past Surgical History:   Procedure Laterality Date     C EXPLORATORY OF ABDOMEN  1/25/2007    Exploratory laparotomy.  Lysis of adhesions with reduction of internal hernia.     C ROTATOR CUFF STRAP      s/p rotator cuff surgery     COLONOSCOPY N/A 12/5/2016    Procedure: COMBINED COLONOSCOPY, SINGLE OR MULTIPLE BIOPSY/POLYPECTOMY BY BIOPSY;  Surgeon: Benjamin Cavazos MD;  Location: [facility]     HC COLONOSCOPY THRU STOMA, DIAGNOSTIC  8/23/2004     HC KNEE SCOPE,MED/LAT MENISCUS REPAIR       HC REMOVE TONSILS/ADENOIDS,<13 Y/O      unsure of age     Hydrocephalus  2015    shunt placed     INJECT EPIDURAL LUMBAR N/A 6/8/2017    Procedure: INJECT EPIDURAL LUMBAR;  lumbar epidural steroid injection Left Lumbar 5 to sacral 1;  Surgeon: Pawan Davenport MD;  Location: [facility] OR     REMOVAL OF SPERM DUCT(S)      Vasectomy     REPLACE VALVE AORTIC N/A 9/14/2015    Procedure: REPLACE VALVE AORTIC;  Surgeon: Sang Chan MD;  Location:  OR       Social History:   reports that he has quit smoking. He has never used smokeless tobacco. He reports that he drinks about 1.8 - 2.4 oz of alcohol per week. He reports that he does not use drugs. Spends allen in [place], [place].  with children, grandchildren, and great grandchildren.    Family History:  Not assessed today.    Medications:  Current Outpatient Medications   Medication Sig Dispense Refill     acetaminophen 650 MG TABS Take 650 mg by mouth every 6 hours 100 tablet       allopurinol (ZYLOPRIM) 100 MG tablet Take 1 tablet (100 mg) by mouth 2 times daily 180 tablet 3     apixaban ANTICOAGULANT (ELIQUIS) 5 MG tablet Take 1 tablet (5 mg) by mouth 2 times daily 180 tablet 3     aspirin EC 81 MG EC tablet Take 1 tablet (81 mg) by mouth daily       atorvastatin (LIPITOR) 40 MG tablet Take 1 tablet (40 mg) by mouth daily 90 tablet 3     diltiazem (DILACOR XR) 120 MG 24 hr capsule Take 1 capsule (120 mg) by mouth daily 90 capsule 3     finasteride (PROSCAR) 5 MG tablet Take 1 tablet (5 mg) by mouth daily 90 tablet 3     FLUoxetine (PROZAC) 20 MG capsule Take 3 capsules (60 mg) by mouth daily 270 capsule 3     furosemide (LASIX) 20 MG tablet Take 1 tablet (20 mg) by mouth daily 90 tablet 3     metoprolol tartrate (LOPRESSOR) 50 MG tablet Take 1 tablet (50 mg) by mouth 2 times daily 180 tablet 3     nitroglycerin (NITROSTAT) 0.4 MG SL tablet Place 1 tablet (0.4 mg) under the tongue every 5 minutes as needed for chest pain 25 tablet 4     polyethylene glycol (MIRALAX/GLYCOLAX) packet Take 17 g by mouth daily 7 packet        Allergies   Allergen Reactions     Seroquel [Quetiapine] Other (See Comments)     Felt funny       Review of Systems:  -Constitutional: Patient is otherwise feeling well, in usual state of health.   -Skin: As above in HPI. No additional skin concerns.      Physical exam:  Vitals: There were no vitals taken for this visit.  GEN: This is a well developed, well-nourished male in no acute distress, in a pleasant mood.    SKIN: Total skin excluding the undergarment areas was performed. The exam included the head/face, neck, both arms, chest, back, abdomen, both legs, digits and/or nails and buttocks. Did not remove knee brace to examine left knee, but exposed skin had no concerning lesions on it.   - Barnett type II skin  - Scattered brown macules on sun exposed areas.  - Moderate dermatoheliosis.  - Gritty pink papule on the right cheek, right temple x 3, nose, left temple x  4, left cheek x 2  - There are dome shaped bright red papules on the trunk.   - Neurofibromas, right upper arm, right chest  - Multiple regular brown pigmented macules and papules are identified on the trunk.   - There are waxy stuck on tan to brown papules on the trunk.  - No other areas of concern on examination.     Impression/Plan:  1. Sun damaged skin with solar lentigines.     Sun precaution was advised including the use of sun screens of SPF 30 or higher, sun protective clothing, and avoidance of tanning beds.    2. Benign findings including: seborrheic keratoses, cherry angioma, neurofibromas    Patient reassured of the benign nature of these lesions.    No further intervention required. Patient to report changes.     3. Multiple benign nevi    No further intervention required. Patient to report changes.     Patient reassured of the benign nature of these lesions.    4. Actinic keratosis. Discussed precancerous nature of these lesions. Recommended treatment to prevent progression to a squamous cell carcinoma.   Cryotherapy procedure note: After verbal consent and discussion of risks and benefits including but no limited to dyspigmentation/scar, blister, and pain, 11 was(were) treated with 1-2mm freeze border for 2 cycles with liquid nitrogen. Post cryotherapy instructions were provided.     Follow-up 6 months for skin exam, sooner for lesions of concern.        Staff Involved:  Scribe/Staff      Scribe Disclosure  I, Elaine Wu, am serving as a scribe to document services personally performed by Dr. Jana Trinidad MD, based on data collection and the provider's statements to me.     Provider Disclosure:   The documentation recorded by the scribe accurately reflects the services I personally performed and the decisions made by me.    Jana Trinidad MD    Department of Dermatology  [facility]: Phone: [phone],  Fax:[phone]  [facility]: Phone: [phone], Fax: [phone]                          Again, thank you for allowing me to participate in the care of your patient.        Sincerely,        Jnaa Trinidad MD

## 2019-05-07 NOTE — PATIENT INSTRUCTIONS
Cryotherapy    What is it?    Use of a very cold liquid, such as liquid nitrogen, to freeze and destroy abnormal skin cells that need to be removed    What should I expect?    Tenderness and redness    A small blister that might grow and fill with dark purple blood. There may be crusting.    More than one treatment may be needed if the lesions do not go away.    How do I care for the treated area?    Gently wash the area with your hands when bathing.    Use a thin layer of Vaseline to help with healing. You may use a Band-Aid.     The area should heal within 7-10 days and may leave behind a pink or lighter color.     Do not use an antibiotic or Neosporin ointment.     You may take acetaminophen (Tylenol) for pain.     Call your Doctor if you have:    Severe pain    Signs of infection (warmth, redness, cloudy yellow drainage, and or a bad smell)    Questions or concerns    Who should I call with questions?       [facility]: [phone]       [facility]: [phone]       For urgent needs outside of business hours call the [facility] at [phone]        and ask for the dermatology resident on call

## 2019-05-07 NOTE — PROGRESS NOTES
"[facility] Dermatology Note      Dermatology Problem List:  1.AKs, s/p cryotherapy  [place]: [place], [place], December - April.     Encounter Date: May 7, 2019    CC:  Chief Complaint   Patient presents with     Skin Check     Area of concern on right cheek. No personal hx of SC.          History of Present Illness:  Mr. Schumacher \"Ho\" NELSON Navarro is a 78 year old male who presents for a follow up for a skin check. Today, he has a lesion of concern on his right cheek. He notes this is rough in texture. It does not hurt or bleed. Patient presents with his wife. Patient recently returned from wintering in [place]. He wore sunscreen sometimes while there this year, but does still enjoy laying out in the backyard to get sun while there. No personal history of skin cancer. No other concerns addressed today.      Past Medical History:   Patient Active Problem List   Diagnosis     Chronic ischemic heart disease     Gout     HYPERLIPIDEMIA LDL GOAL <100     Advanced directives, counseling/discussion     Mild major depression (H)     S/P AVR (aortic valve replacement)     Transient hyperglycemia post procedure     Anemia due to blood loss, acute     Delirium     Hypertension goal BP (blood pressure) < 140/90     Essential hypertension with goal blood pressure less than 140/90     Major depressive disorder, recurrent episode, mild (H)     Chronic atrial fibrillation (H)     Pain of left sacroiliac joint     Lumbar radiculopathy     Past Medical History:   Diagnosis Date     Anxiety state, unspecified      Atrial fibrillation (H)      Bell's palsy 12/12/1997     Bioprosthetic aortic valve replacement during current hospitalization      Coronary atherosclerosis of unspecified type of vessel, native or graft     coronary artery disease with history of MI and stent placement      Gout, unspecified      Hydrocephalus 2015     Old myocardial infarction 3/1998    Hx of MI     Osteoarthrosis, unspecified whether " generalized or localized, unspecified site     Diffuse migratory arthritis     Other and unspecified hyperlipidemia      Paroxysmal supraventricular tachycardia (H)     Hx of PAT     Pure hypercholesterolemia      Stented coronary artery      Past Surgical History:   Procedure Laterality Date     C EXPLORATORY OF ABDOMEN  1/25/2007    Exploratory laparotomy.  Lysis of adhesions with reduction of internal hernia.     C ROTATOR CUFF STRAP      s/p rotator cuff surgery     COLONOSCOPY N/A 12/5/2016    Procedure: COMBINED COLONOSCOPY, SINGLE OR MULTIPLE BIOPSY/POLYPECTOMY BY BIOPSY;  Surgeon: Benjamin Cavazos MD;  Location:  GI     HC COLONOSCOPY THRU STOMA, DIAGNOSTIC  8/23/2004     HC KNEE SCOPE,MED/LAT MENISCUS REPAIR       HC REMOVE TONSILS/ADENOIDS,<11 Y/O      unsure of age     Hydrocephalus  2015    shunt placed     INJECT EPIDURAL LUMBAR N/A 6/8/2017    Procedure: INJECT EPIDURAL LUMBAR;  lumbar epidural steroid injection Left Lumbar 5 to sacral 1;  Surgeon: Pawan Davenport MD;  Location: [facility] OR     REMOVAL OF SPERM DUCT(S)      Vasectomy     REPLACE VALVE AORTIC N/A 9/14/2015    Procedure: REPLACE VALVE AORTIC;  Surgeon: Sang Chan MD;  Location:  OR       Social History:   reports that he has quit smoking. He has never used smokeless tobacco. He reports that he drinks about 1.8 - 2.4 oz of alcohol per week. He reports that he does not use drugs. Spends allen in [place], [place].  with children, grandchildren, and great grandchildren.    Family History:  Not assessed today.    Medications:  Current Outpatient Medications   Medication Sig Dispense Refill     acetaminophen 650 MG TABS Take 650 mg by mouth every 6 hours 100 tablet      allopurinol (ZYLOPRIM) 100 MG tablet Take 1 tablet (100 mg) by mouth 2 times daily 180 tablet 3     apixaban ANTICOAGULANT (ELIQUIS) 5 MG tablet Take 1 tablet (5 mg) by mouth 2 times daily 180 tablet 3     aspirin EC 81 MG EC tablet Take 1 tablet (81 mg)  by mouth daily       atorvastatin (LIPITOR) 40 MG tablet Take 1 tablet (40 mg) by mouth daily 90 tablet 3     diltiazem (DILACOR XR) 120 MG 24 hr capsule Take 1 capsule (120 mg) by mouth daily 90 capsule 3     finasteride (PROSCAR) 5 MG tablet Take 1 tablet (5 mg) by mouth daily 90 tablet 3     FLUoxetine (PROZAC) 20 MG capsule Take 3 capsules (60 mg) by mouth daily 270 capsule 3     furosemide (LASIX) 20 MG tablet Take 1 tablet (20 mg) by mouth daily 90 tablet 3     metoprolol tartrate (LOPRESSOR) 50 MG tablet Take 1 tablet (50 mg) by mouth 2 times daily 180 tablet 3     nitroglycerin (NITROSTAT) 0.4 MG SL tablet Place 1 tablet (0.4 mg) under the tongue every 5 minutes as needed for chest pain 25 tablet 4     polyethylene glycol (MIRALAX/GLYCOLAX) packet Take 17 g by mouth daily 7 packet        Allergies   Allergen Reactions     Seroquel [Quetiapine] Other (See Comments)     Felt funny       Review of Systems:  -Constitutional: Patient is otherwise feeling well, in usual state of health.   -Skin: As above in HPI. No additional skin concerns.      Physical exam:  Vitals: There were no vitals taken for this visit.  GEN: This is a well developed, well-nourished male in no acute distress, in a pleasant mood.    SKIN: Total skin excluding the undergarment areas was performed. The exam included the head/face, neck, both arms, chest, back, abdomen, both legs, digits and/or nails and buttocks. Did not remove knee brace to examine left knee, but exposed skin had no concerning lesions on it.   - Barnett type II skin  - Scattered brown macules on sun exposed areas.  - Moderate dermatoheliosis.  - Gritty pink papule on the right cheek, right temple x 3, nose, left temple x 4, left cheek x 2  - There are dome shaped bright red papules on the trunk.   - Neurofibromas, right upper arm, right chest  - Multiple regular brown pigmented macules and papules are identified on the trunk.   - There are waxy stuck on tan to brown  papules on the trunk.  - No other areas of concern on examination.     Impression/Plan:  1. Sun damaged skin with solar lentigines.     Sun precaution was advised including the use of sun screens of SPF 30 or higher, sun protective clothing, and avoidance of tanning beds.    2. Benign findings including: seborrheic keratoses, cherry angioma, neurofibromas    Patient reassured of the benign nature of these lesions.    No further intervention required. Patient to report changes.     3. Multiple benign nevi    No further intervention required. Patient to report changes.     Patient reassured of the benign nature of these lesions.    4. Actinic keratosis. Discussed precancerous nature of these lesions. Recommended treatment to prevent progression to a squamous cell carcinoma.   Cryotherapy procedure note: After verbal consent and discussion of risks and benefits including but no limited to dyspigmentation/scar, blister, and pain, 11 was(were) treated with 1-2mm freeze border for 2 cycles with liquid nitrogen. Post cryotherapy instructions were provided.     Follow-up 6 months for skin exam, sooner for lesions of concern.        Staff Involved:  Scribe/Staff      Scribe Disclosure  I, Elaine Wu, am serving as a scribe to document services personally performed by Dr. Jana Trinidad MD, based on data collection and the provider's statements to me.     Provider Disclosure:   The documentation recorded by the scribe accurately reflects the services I personally performed and the decisions made by me.    Jana Trinidad MD    Department of Dermatology  [facility]: Phone: [phone], Fax:[phone]  [facility]: Phone: [phone], Fax: [phone]

## 2019-05-07 NOTE — NURSING NOTE
Endy Navarro's goals for this visit include:   Chief Complaint   Patient presents with     Skin Check     Area of concern on right cheek. No personal hx of SC.      He requests these members of his care team be copied on today's visit information:     PCP: Richi Alvarez    Referring Provider:  No referring provider defined for this encounter.    There were no vitals taken for this visit.    Do you need any medication refills at today's visit? No    Ai Leonard LPN

## 2019-05-29 ENCOUNTER — HOSPITAL ENCOUNTER (OUTPATIENT)
Dept: GENERAL RADIOLOGY | Facility: CLINIC | Age: 79
Discharge: HOME | End: 2019-05-29
Attending: INTERNAL MEDICINE | Admitting: INTERNAL MEDICINE
Payer: MEDICARE

## 2019-05-29 ENCOUNTER — OFFICE VISIT (OUTPATIENT)
Dept: INTERNAL MEDICINE | Facility: CLINIC | Age: 79
End: 2019-05-29
Payer: MEDICARE

## 2019-05-29 VITALS
SYSTOLIC BLOOD PRESSURE: 148 MMHG | BODY MASS INDEX: 35.92 KG/M2 | OXYGEN SATURATION: 96 % | WEIGHT: 237 LBS | TEMPERATURE: 96.8 F | DIASTOLIC BLOOD PRESSURE: 84 MMHG | HEART RATE: 70 BPM | RESPIRATION RATE: 20 BRPM | HEIGHT: 68 IN

## 2019-05-29 DIAGNOSIS — Z95.2 S/P AVR: Primary | ICD-10-CM

## 2019-05-29 DIAGNOSIS — G89.29 CHRONIC MIDLINE LOW BACK PAIN WITHOUT SCIATICA: ICD-10-CM

## 2019-05-29 DIAGNOSIS — Z98.2 S/P VP SHUNT: ICD-10-CM

## 2019-05-29 DIAGNOSIS — M25.562 LEFT KNEE PAIN: ICD-10-CM

## 2019-05-29 DIAGNOSIS — M25.562 CHRONIC PAIN OF LEFT KNEE: ICD-10-CM

## 2019-05-29 DIAGNOSIS — E66.01 MORBID OBESITY (H): ICD-10-CM

## 2019-05-29 DIAGNOSIS — G89.29 CHRONIC PAIN OF LEFT KNEE: ICD-10-CM

## 2019-05-29 DIAGNOSIS — R29.6 FALLING: ICD-10-CM

## 2019-05-29 DIAGNOSIS — M54.50 CHRONIC MIDLINE LOW BACK PAIN WITHOUT SCIATICA: ICD-10-CM

## 2019-05-29 PROCEDURE — 73562 X-RAY EXAM OF KNEE 3: CPT | Mod: TC,LT

## 2019-05-29 PROCEDURE — 99214 OFFICE O/P EST MOD 30 MIN: CPT | Performed by: INTERNAL MEDICINE

## 2019-05-29 ASSESSMENT — MIFFLIN-ST. JEOR: SCORE: 1764.52

## 2019-05-29 ASSESSMENT — PAIN SCALES - GENERAL: PAINLEVEL: NO PAIN (0)

## 2019-05-29 NOTE — PROGRESS NOTES
Subjective     Endy Navarro is a 78 year old male who presents to clinic today for the following health issues:    HPI   Chief Complaint   Patient presents with     Knee Pain     left knee pain from a series of falls     Falling down more lately, just happens. Uses a cane to try and help him stabilize.  Can't walk as much since his aortic valve replacement.      Joints bother him as well.        Last year head ct was ok for pressure and hydrocephalus.    Left knee bothers him, better with wrapping       Past Medical History:   Diagnosis Date     Anxiety state, unspecified      Atrial fibrillation (H)      Bell's palsy 12/12/1997     Bioprosthetic aortic valve replacement during current hospitalization      Coronary atherosclerosis of unspecified type of vessel, native or graft     coronary artery disease with history of MI and stent placement      Gout, unspecified      Hydrocephalus 2015     Old myocardial infarction 3/1998    Hx of MI     Osteoarthrosis, unspecified whether generalized or localized, unspecified site     Diffuse migratory arthritis     Other and unspecified hyperlipidemia      Paroxysmal supraventricular tachycardia (H)     Hx of PAT     Pure hypercholesterolemia      Stented coronary artery        Current Outpatient Medications   Medication     acetaminophen 650 MG TABS     allopurinol (ZYLOPRIM) 100 MG tablet     apixaban ANTICOAGULANT (ELIQUIS) 5 MG tablet     aspirin EC 81 MG EC tablet     atorvastatin (LIPITOR) 40 MG tablet     diltiazem (DILACOR XR) 120 MG 24 hr capsule     finasteride (PROSCAR) 5 MG tablet     FLUoxetine (PROZAC) 20 MG capsule     furosemide (LASIX) 20 MG tablet     metoprolol tartrate (LOPRESSOR) 50 MG tablet     order for DME     nitroglycerin (NITROSTAT) 0.4 MG SL tablet     polyethylene glycol (MIRALAX/GLYCOLAX) packet     No current facility-administered medications for this visit.        Social History     Tobacco Use     Smoking status: Former Smoker     Smokeless  "tobacco: Never Used     Tobacco comment: quit 25 yr ago   Substance Use Topics     Alcohol use: Yes     Alcohol/week: 1.8 - 2.4 oz     Types: 3 - 4 Standard drinks or equivalent per week     Comment: couple times a week     Drug use: No       Review of Systems  Constitutional-No fevers, chills, or weight changes..  ENT-No earpain, sore throat, voice changes or rhinitis.  Cardiac-No chest pain or palpitations.  Respiratory-No cough, sob, or hemoptysis.  GI-No nausea, vomitting, diarrhea, constipation, or blood in the stool.    Physical Exam  Vitals: /84   Pulse 70   Temp 96.8  F (36  C) (Temporal)   Resp 20   Ht 1.727 m (5' 8\")   Wt 107.5 kg (237 lb)   SpO2 96%   BMI 36.04 kg/m    BMI= Body mass index is 36.04 kg/m .    General Appearance-healthy, alert, no distress  Cardiac-regular rate and rhythm  with normal S1, S2 ; no murmur, rub or gallops  Lungs-clear to auscultation  Musculoskeletal-left knee mild grinding, some skin irritation.      ASSESSMENT:  This is a 78-year-old gentleman has a hip valve replacement, atrial fibrillation, history of hydrocephalus and has a  shunt.  He has had more falling episodes over the last year, he has some left knee pain from falling on it.  He has multiple reasons for his fall including his heart, possible shunt not working or hydrocephalus.  He has some peripheral neuropathy.  He seen neurology in the past.  [place] disrupt[place] his work-up.  We are getting him back with a new cardiologist, refer him to neurosurgery here for evaluation.  I have ordered physical therapy to work with him on his walking and to try to get him sized for a walker which I have given a DME prescription for today.  I will see him back in 4 weeks.  This patient is at high risk with his Eliquis and falling.  We discussed that if his falling is too often will need to cut back on his anticoagulation but we need cardiology to give us their opinion.  I do recommend he " use a four-wheel walker all the time now.    Electronically signed by Richi Alvarez MD

## 2019-06-04 ENCOUNTER — HOSPITAL ENCOUNTER (OUTPATIENT)
Dept: PHYSICAL THERAPY | Facility: CLINIC | Age: 79
Setting detail: THERAPIES SERIES
End: 2019-06-04
Attending: INTERNAL MEDICINE
Payer: MEDICARE

## 2019-06-04 DIAGNOSIS — R29.6 FALLING: ICD-10-CM

## 2019-06-04 PROCEDURE — 97161 PT EVAL LOW COMPLEX 20 MIN: CPT | Mod: GP | Performed by: PHYSICAL THERAPIST

## 2019-06-04 PROCEDURE — 97112 NEUROMUSCULAR REEDUCATION: CPT | Mod: GP | Performed by: PHYSICAL THERAPIST

## 2019-06-04 NOTE — PROGRESS NOTES
"   06/04/19 1045   Quick Adds   Quick Adds Certification   Type of Visit Initial OP PT Evaluation   General Information   Start of Care Date 06/04/19   Referring Physician Dr Richi Alvarez   Orders Evaluate and Treat as Indicated   Additional Orders walker, help [facility] 4 wheeled walker and balance   Order Date 05/29/19   Medical Diagnosis falling   Onset of illness/injury or Date of Surgery 05/29/19   Precautions/Limitations fall precautions   Special Instructions pt will ask for help if he is feeling unsteady but is independent in general   Surgical/Medical history reviewed Yes   Pertinent history of current problem (include personal factors and/or comorbidities that impact the POC) Pt reports he has been more unsteady with walking since heart surgery a couple years ago.  He has great difficulty with stairs because he gets worn out and then balance and abiltiy goes down.  He has a cane but forgot to use it coming here today.  He lives in twin home with no steps to enter, only steps to basement if he wants to go there.  Heart attack 15 years ago and a couple stents after many years and then 2 years ago put a pig's valve in heart.  He can still feel hard to breathe and heart type symptoms, but \"other than that, doing pretty good.\"  Pt has left knee wrapped toay--results of x-ray below.  Back problems were helped with PT last time, which was for the hip pain coming from SI areas.  Goes to [place] for allen, stays active.     Pertinent Visual History  has bifocals   Prior level of function comment independent, uses cane mostly   Diagnostic Tests X-ray   X-ray Results Results   X-ray results Left knee IMPRESSION: Moderate to severe medial compartment and moderate patellofemoral compartment degenerative joint disease of the left knee. These findings are new since the prior study from 2005.   Previous/Current Treatment Physical Therapy   Improvement after PT Moderate   Patient role/Employment history Retired   Living " environment House/[facility]   Home/Community Accessibility Comments lives with wife   Current Assistive Devices Standard Cane;Front Wheeled Walker   Patient/Family Goals Statement Getting a 4-wheeled walker.  Feel steady on feet and get back to big 3-4 mile walks.  Get rid of the knee pain, so it doesn't come back if I bump it.  My back the same thing--so I don't twist it.     Fall Risk Screen   Fall screen completed by PT   Have you fallen 2 or more times in the past year? Yes   Have you fallen and had an injury in the past year? Yes   Timed Up and Go score (seconds) 15.25   Is patient a fall risk? Yes;Department fall risk interventions implemented   Pain   Pain comments anterior-medial left knee pain (same area as where x-ray indicates a problem)   Cognitive Status Examination   Orientation orientation to person, place and time   Level of Consciousness alert   Follows Commands and Answers Questions 100% of the time   Personal Safety and Judgment intact   Memory intact   Observation   Observation comes in without and AD today, usually uses cane at home, but forgot it today   Posture   Posture Kyphosis;Forward head position   Strength   Strength Comments bilat 5/5 LE hip, knee, DF and 5/5 right but slight give left shoulder in flex and abd--pt reports scarring from heart surgeries have affected shoulders   Transfer Skills   Transfer Comments uses hands to control all transfers and takes moderately increased time on transfers as compared to his other abilities.   Gait   Gait Comments uses slightly wider YURIY for walking   Balance Special Tests   Balance Special Tests Single leg stance right;Single leg stance left;Timed up and go;Modified CTSIB Conditions;Mora balance   Balance Special Tests Mora Balance   Score out of 56 34   Comments pt needing to sit between each test today   Balance Special Tests Single Leg Stance Right,   Right, seconds 1 Seconds   Comments LOB before 2 seconds   Balance Special Tests Single Leg  Stance Left   Left, seconds 1 Seconds   Comments LOB before 2 seconds   Balance Special Tests Modified CTSIB Conditions   Condition 1, seconds 30 Seconds   Condition 2, seconds 4 Seconds   Condition 4, seconds 15 Seconds  (about 15 first trial then 24 with second trial worked for NE)   Condition 5, seconds 1 Seconds   Modified CTSIB Comments 50/4=12.5   Planned Therapy Interventions   Planned Therapy Interventions balance training;gait training;neuromuscular re-education   Clinical Impression   Criteria for Skilled Therapeutic Interventions Met yes, treatment indicated   PT Diagnosis imbalanced, decreased gait stability, needs training on new AD   Influenced by the following impairments general cardiac weakness, impaired balance   Functional limitations due to impairments walking community distances, balance safety for ADLs   Clinical Presentation Stable/Uncomplicated   Clinical Decision Making (Complexity) Low complexity   Therapy Frequency 1 time/week   Predicted Duration of Therapy Intervention (days/wks) 6 weeks   Risk & Benefits of therapy have been explained Yes   Patient, Family & other staff in agreement with plan of care Yes   Clinical Impression Comments pt will need to get new walker and then train on it, will do balance training until walker received to work with and then add gait training and adjustment on his own walker (use ours until then.)    Education Assessment   Preferred Learning Style Listening;Demonstration   Barriers to Learning No barriers   GOALS   PT Eval Goals 1;2;3   Goal 1   Goal Identifier walker   Goal Description Pt will have acquired his new 4-wheeled walker with assistance of PT to get order and coordinate where/how/what to get in order to be able to have it properly fit by PT and train with it.   Target Date 06/28/19   Goal 2   Goal Identifier safe gait   Goal Description Ho will be able to safely negotiate in community and around home with his new 4-wheeled walker, which will  allow for proper sitting rests due to caridac condition and safety with mobility to reduce risk of falls.   Target Date 08/16/19   Goal 3   Goal Identifier balance   Goal Description Ho will be completing a daily balance home program and be able to report no falls x 6 weeks.    Target Date 08/16/19   Total Evaluation Time   PT Eval, Low Complexity Minutes (48466) 40   Therapy Certification   Certification date from 06/04/19   Certification date to 08/16/19   Medical Diagnosis falling   Certification I certify the need for these services furnished under this plan of treatment and while under my care.  (Physician co-signature of this document indicates review and certification of the therapy plan).

## 2019-06-04 NOTE — PROGRESS NOTES
[facility]        OUTPATIENT PHYSICAL THERAPY FUNCTIONAL EVALUATION  PLAN OF TREATMENT FOR OUTPATIENT REHABILITATION  (COMPLETE FOR INITIAL CLAIMS ONLY)  Patient's Last Name, First Name, M.I.  YOB: 1940  Endy Navarro     Provider's Name   [facility]   Medical Record No.  [id number]     Start of Care Date:  06/04/19   Onset Date:  05/29/19   Type:     _X__PT   ____OT  ____SLP Medical Diagnosis:  falling     PT Diagnosis:  imbalanced, decreased gait stability, needs training on new AD Visits from SOC:  1                              __________________________________________________________________________________  Plan of Treatment/Functional Goals:  balance training, gait training, neuromuscular re-education           GOALS  walker  Pt will have acquired his new 4-wheeled walker with assistance of PT to get order and coordinate where/how/what to get in order to be able to have it properly fit by PT and train with it.  06/28/19    safe gait  Ho will be able to safely negotiate in community and around home with his new 4-wheeled walker, which will allow for proper sitting rests due to caridac condition and safety with mobility to reduce risk of falls.  08/16/19    balance  Ho will be completing a daily balance home program and be able to report no falls x 6 weeks.   08/16/19           Therapy Frequency:  1 time/week   Predicted Duration of Therapy Intervention:  6 weeks    Janis Donnelly PT                                    I CERTIFY THE NEED FOR THESE SERVICES FURNISHED UNDER        THIS PLAN OF TREATMENT AND WHILE UNDER MY CARE     (Physician co-signature of this document indicates review and certification of the therapy plan).                Certification Date From:  06/04/19   Certification Date To:  08/16/19    Referring Provider:  Dr Richi LUNDBERG  Antonio    Initial Assessment  See Epic Evaluation- Start of Care Date: 06/04/19

## 2019-06-10 ENCOUNTER — HOSPITAL ENCOUNTER (OUTPATIENT)
Dept: PHYSICAL THERAPY | Facility: CLINIC | Age: 79
Setting detail: THERAPIES SERIES
End: 2019-06-10
Attending: INTERNAL MEDICINE
Payer: MEDICARE

## 2019-06-10 PROCEDURE — 97112 NEUROMUSCULAR REEDUCATION: CPT | Mod: GP | Performed by: PHYSICAL THERAPIST

## 2019-06-10 PROCEDURE — 97116 GAIT TRAINING THERAPY: CPT | Mod: GP | Performed by: PHYSICAL THERAPIST

## 2019-06-18 ENCOUNTER — HOSPITAL ENCOUNTER (OUTPATIENT)
Dept: PHYSICAL THERAPY | Facility: CLINIC | Age: 79
Setting detail: THERAPIES SERIES
End: 2019-06-18
Attending: INTERNAL MEDICINE
Payer: MEDICARE

## 2019-06-18 PROCEDURE — 97112 NEUROMUSCULAR REEDUCATION: CPT | Mod: GP | Performed by: PHYSICAL THERAPIST

## 2019-06-18 PROCEDURE — 97116 GAIT TRAINING THERAPY: CPT | Mod: GP | Performed by: PHYSICAL THERAPIST

## 2019-06-19 ENCOUNTER — OFFICE VISIT (OUTPATIENT)
Dept: CARDIOLOGY | Facility: CLINIC | Age: 79
End: 2019-06-19
Payer: MEDICARE

## 2019-06-19 VITALS
SYSTOLIC BLOOD PRESSURE: 156 MMHG | HEIGHT: 68 IN | DIASTOLIC BLOOD PRESSURE: 78 MMHG | OXYGEN SATURATION: 99 % | BODY MASS INDEX: 35.57 KG/M2 | HEART RATE: 68 BPM | WEIGHT: 234.7 LBS

## 2019-06-19 DIAGNOSIS — Z95.2 S/P AVR (AORTIC VALVE REPLACEMENT): ICD-10-CM

## 2019-06-19 DIAGNOSIS — I48.20 CHRONIC ATRIAL FIBRILLATION (H): ICD-10-CM

## 2019-06-19 DIAGNOSIS — I10 HYPERTENSION GOAL BP (BLOOD PRESSURE) < 140/90: ICD-10-CM

## 2019-06-19 DIAGNOSIS — E78.5 HYPERLIPIDEMIA LDL GOAL <100: ICD-10-CM

## 2019-06-19 PROCEDURE — 99214 OFFICE O/P EST MOD 30 MIN: CPT | Performed by: INTERNAL MEDICINE

## 2019-06-19 RX ORDER — LISINOPRIL 10 MG/1
10 TABLET ORAL DAILY
Qty: 30 TABLET | Refills: 11 | Status: SHIPPED | OUTPATIENT
Start: 2019-06-19 | End: 2019-08-26

## 2019-06-19 ASSESSMENT — MIFFLIN-ST. JEOR: SCORE: 1754.09

## 2019-06-19 NOTE — PROGRESS NOTES
Service Date: 06/19/2019      HISTORY OF PRESENT ILLNESS:  Endy Navarro, a 78-year-old man with coronary artery disease, aortic stenosis (status post bioprosthetic aortic valve replacement), hypertension, dyslipidemia, paroxysmal atrial fibrillation, normal pressure hydrocephalus (status post  shunt), osteoarthritis, and depression, was seen today at your request for followup.      Since seen by Dr. Morrell in 2017, Mr. Navarro remains free of chest pain, syncope, focal neurologic deficit or sustained palpitation.  The patient uses a walker because of imbalance as a consequence of  normal pressure hydrocephalus, previously treated with a  shunt.  He tries to exercise and follow a prudent diet.  He and his wife measure his blood pressure at home and they tell me that usually his systolic pressures are in the 120  MmHg range, but all systolic pressures documented in our chart clinic visits are above 130 mmHg.      The patient has a solitary kidney, which he states is a consequence of agenesis..  His baseline GFR is in the low 50s.      PAST MEDICAL HISTORY:   1.  Aortic stenosis - status post bioprosthetic aortic valve replacement with Trifecta bioprosthesis 2015.   2.  Coronary artery disease - old history of non-ST segment elevation MI with bare metal stent implantation.  Preoperative coronary angiogram showing 100% occlusion of marginal branch, but no other significant coronary narrowings.  No bypass surgery was performed at the time of valve replacement.   3.  Normal pressure hydrocephalus - status post  shunt.  Residual gait difficulties.  Ambulates with a walker.   4.  Paroxysmal atrial fibrillation - on apixaban and beta blocker therapy.   5.  Dyslipidemia.   6.  Obesity.   7.  Chronic kidney disease - history of congenital agenesis of 1 kidney.  Baseline GFR of 51 with a creatinine of 1.35.      PHYSICAL EXAMINATION:   GENERAL:  Exam today demonstrates a very pleasant soft spoken 78-year-old man  accompanied by his wife.   VITAL SIGNS:  His height is 1.73 meters, his weight is 106.5 kg, his BMI is 35.7.   LUNGS:  Clear to percussion and auscultation.   CARDIOVASCULAR:  Shows a normal S1 with a normal S2 and a crisp prosthetic second heart sound.  There is a soft 1/6 systolic murmur.  There is no diastolic murmur.  His pulses are full and symmetrical.      LABORATORY STUDIES:  His most recent LDL cholesterol was 70 in 2018.  His most recent creatinine was 1.35 with a GFR of 51.      ASSESSMENT:  Mr. Navarro is asymptomatic on guideline-directed medical therapy.  His systolic blood pressure is well above the target of 130 mmHg systolic advised by current   American College of Cardiology guidelines.  I believe that ACE inhibitors will likely provide better protection for his kidney long-term, and I would recommend that we switch the patient from diltiazem to lisinopril and return to have his blood pressure rechecked and compared with his home machine.  If we cannot achieve 130 mm Hg with lisinopril up to 20 mg daily, the next recommendation would be to switch from metoprolol to carvedilol, a more potent second generation beta blocker.       I stressed the benefits of a Mediterranean style weight loss diet and a regular exercise program as nonpharmacologic means  improving blood pressure control.      RECOMMENDATIONS:   1.  Discontinue diltiazem.   2.  Begin lisinopril 10 mg daily.  Would target systolic pressure less than 130 mmHg.  If we cannot achieve the target with lisinopril up to 20 mg daily, would then switch the patient from metoprolol XL to equivalent dose of carvedilol.   3.  The patient will return for followup to the clinic in about 2 weeks with his blood pressure cuff to make certain he is recording pressures accurately at home and to recheck his BMP after the medication change. If you prefer to manage his blood pressure, feel free to make any changes and follow up you deem appropriate.   4.  Follow  up with me in about 1 year.      We appreciate the opportunity to be involved in the care of your patient, Carina Navarro.      cc:   [place] [address]         KALYN DELAROSA MD             D: 2019   T: 2019   MT: PRADEEP      Name:     CARINA NAVARRO   MRN:      [id number]        Account:      [id number]   :      1940           Service Date: 2019      Document: [id number]

## 2019-06-19 NOTE — PROGRESS NOTES
HISTORY OF PRESENT ILLNESS:  Difficulty with balance  Several mechanical falls.. No passing out. 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 BP better controlled at home 120s, but repeatedly above 140 here.     Orders this Visit:  No orders of the defined types were placed in this encounter.    No orders of the defined types were placed in this encounter.    There are no discontinued medications.    Encounter Diagnoses   Name Primary?     S/P AVR (aortic valve replacement)      Hypertension goal BP (blood pressure) < 140/90      Hyperlipidemia LDL goal <100      Chronic atrial fibrillation (H)        CURRENT MEDICATIONS:  Current Outpatient Medications   Medication Sig Dispense Refill     acetaminophen 650 MG TABS Take 650 mg by mouth every 6 hours 100 tablet      allopurinol (ZYLOPRIM) 100 MG tablet Take 1 tablet (100 mg) by mouth 2 times daily 180 tablet 3     apixaban ANTICOAGULANT (ELIQUIS) 5 MG tablet Take 1 tablet (5 mg) by mouth 2 times daily 180 tablet 3     aspirin EC 81 MG EC tablet Take 1 tablet (81 mg) by mouth daily       atorvastatin (LIPITOR) 40 MG tablet Take 1 tablet (40 mg) by mouth daily 90 tablet 3     diltiazem (DILACOR XR) 120 MG 24 hr capsule Take 1 capsule (120 mg) by mouth daily 90 capsule 3     finasteride (PROSCAR) 5 MG tablet Take 1 tablet (5 mg) by mouth daily 90 tablet 3     FLUoxetine (PROZAC) 20 MG capsule Take 3 capsules (60 mg) by mouth daily 270 capsule 3     furosemide (LASIX) 20 MG tablet Take 1 tablet (20 mg) by mouth daily 90 tablet 3     metoprolol tartrate (LOPRESSOR) 50 MG tablet Take 1 tablet (50 mg) by mouth 2 times daily 180 tablet 3     order for DME Equipment being ordered: 4 wheel walker with seat and brakes 1 Device 0     polyethylene glycol (MIRALAX/GLYCOLAX) packet Take 17 g by mouth daily (Patient taking differently: Take 17 g by mouth as needed ) 7 packet      nitroglycerin (NITROSTAT) 0.4 MG SL tablet Place 1 tablet (0.4 mg) under the tongue every 5 minutes  "as needed for chest pain (Patient not taking: Reported on 6/19/2019) 25 tablet 4       ALLERGIES     Allergies   Allergen Reactions     Seroquel [Quetiapine] Other (See Comments)     [place] funny       PAST MEDICAL, SURGICAL, FAMILY, SOCIAL HISTORY:  History was reviewed and updated as needed, see medical record.    Review of Systems:  A 12-point review of systems was completed, see medical record for detailed review of systems information.    Physical Exam:  Vitals: /78 (BP Location: Left arm, Patient Position: Fowlers, Cuff Size: Adult Large)   Pulse 68   Ht 1.727 m (5' 8\")   Wt 106.5 kg (234 lb 11.2 oz)   SpO2 99%   BMI 35.69 kg/m      Constitutional:           Skin:           Head:           Eyes:           ENT:           Neck:           Chest:           Cardiac:                    Abdomen:           Vascular:                                        Extremities and Back:           Neurological:           ASSESSMENT: Needs better bp control. With abnormal renal function, need better control. Would change to ACE and adjust target .        RECOMMENDATIONS:   Weight loss Mediterranean diet  Exercise   Stop diltiazem  Begin lisinopril  Bring blood pressure machine in next visit   Advanced level practitioner to recheck bp , bmp, titrate to     [place]        Recent Lab Results:  LIPID RESULTS:  Lab Results   Component Value Date    CHOL 139 11/16/2018    HDL 48 11/16/2018    LDL 70 11/16/2018    TRIG 105 11/16/2018    CHOLHDLRATIO 4.0 10/17/2013       LIVER ENZYME RESULTS:  Lab Results   Component Value Date    AST 19 11/16/2018    ALT 34 11/16/2018       CBC RESULTS:  Lab Results   Component Value Date    WBC 8.2 11/16/2018    RBC 4.74 11/16/2018    HGB 14.7 11/16/2018    HCT 45.9 11/16/2018    MCV 97 11/16/2018    MCH 31.0 11/16/2018    MCHC 32.0 11/16/2018    RDW 13.7 11/16/2018     11/16/2018       BMP RESULTS:  Lab Results   Component Value Date     11/16/2018    POTASSIUM 4.5 " 11/16/2018    CHLORIDE 107 11/16/2018    CO2 28 11/16/2018    ANIONGAP 7 11/16/2018     (H) 11/16/2018    BUN 24 11/16/2018    CR 1.35 (H) 11/16/2018    GFRESTIMATED 51 (L) 11/16/2018    GFRESTBLACK 62 11/16/2018    HARLEEN 8.9 11/16/2018        A1C RESULTS:  Lab Results   Component Value Date    A1C 5.5 09/15/2015       INR RESULTS:  Lab Results   Component Value Date    INR 1.24 (H) 08/02/2018    INR 1.65 (H) 09/14/2015       We greatly appreciate the opportunity to be involved in the care of your patient, Endy Navarro.    Sincerely,  Varinder Cintron MD        Richi Alvarez MD  [address] [place] [id number]

## 2019-06-19 NOTE — LETTER
6/19/2019    Richi Alvarez MD  [address] [facility] [address]    RE: Endy Navarro       Dear Colleague,    I had the pleasure of seeing Endy Navarro in the [facility].    HISTORY OF PRESENT ILLNESS:  Difficulty with balance  Several mechanical falls.. No passing out. 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 BP better controlled at home 120s, but repeatedly above 140 here.     Orders this Visit:  No orders of the defined types were placed in this encounter.    No orders of the defined types were placed in this encounter.    There are no discontinued medications.    Encounter Diagnoses   Name Primary?     S/P AVR (aortic valve replacement)      Hypertension goal BP (blood pressure) < 140/90      Hyperlipidemia LDL goal <100      Chronic atrial fibrillation (H)        CURRENT MEDICATIONS:  Current Outpatient Medications   Medication Sig Dispense Refill     acetaminophen 650 MG TABS Take 650 mg by mouth every 6 hours 100 tablet      allopurinol (ZYLOPRIM) 100 MG tablet Take 1 tablet (100 mg) by mouth 2 times daily 180 tablet 3     apixaban ANTICOAGULANT (ELIQUIS) 5 MG tablet Take 1 tablet (5 mg) by mouth 2 times daily 180 tablet 3     aspirin EC 81 MG EC tablet Take 1 tablet (81 mg) by mouth daily       atorvastatin (LIPITOR) 40 MG tablet Take 1 tablet (40 mg) by mouth daily 90 tablet 3     diltiazem (DILACOR XR) 120 MG 24 hr capsule Take 1 capsule (120 mg) by mouth daily 90 capsule 3     finasteride (PROSCAR) 5 MG tablet Take 1 tablet (5 mg) by mouth daily 90 tablet 3     FLUoxetine (PROZAC) 20 MG capsule Take 3 capsules (60 mg) by mouth daily 270 capsule 3     furosemide (LASIX) 20 MG tablet Take 1 tablet (20 mg) by mouth daily 90 tablet 3     metoprolol tartrate (LOPRESSOR) 50 MG tablet Take 1 tablet (50 mg) by mouth 2 times daily 180 tablet 3     order for DME Equipment being ordered: 4 wheel walker with seat and brakes 1 Device 0     polyethylene glycol  "(MIRALAX/GLYCOLAX) packet Take 17 g by mouth daily (Patient taking differently: Take 17 g by mouth as needed ) 7 packet      nitroglycerin (NITROSTAT) 0.4 MG SL tablet Place 1 tablet (0.4 mg) under the tongue every 5 minutes as needed for chest pain (Patient not taking: Reported on 6/19/2019) 25 tablet 4       ALLERGIES     Allergies   Allergen Reactions     Seroquel [Quetiapine] Other (See Comments)     [place] funny       PAST MEDICAL, SURGICAL, FAMILY, SOCIAL HISTORY:  History was reviewed and updated as needed, see medical record.    Review of Systems:  A 12-point review of systems was completed, see medical record for detailed review of systems information.    Physical Exam:  Vitals: /78 (BP Location: Left arm, Patient Position: Fowlers, Cuff Size: Adult Large)   Pulse 68   Ht 1.727 m (5' 8\")   Wt 106.5 kg (234 lb 11.2 oz)   SpO2 99%   BMI 35.69 kg/m       Constitutional:           Skin:           Head:           Eyes:           ENT:           Neck:           Chest:           Cardiac:                    Abdomen:           Vascular:                                        Extremities and Back:           Neurological:           ASSESSMENT: Needs better bp control. With abnormal renal function, need better control. Would change to ACE and adjust target .        RECOMMENDATIONS:   Weight loss Mediterranean diet  Exercise   Stop diltiazem  Begin lisinopril  Bring blood pressure machine in next visit   Advanced level practitioner to recheck bp , bmp, titrate to     [place]        Recent Lab Results:  LIPID RESULTS:  Lab Results   Component Value Date    CHOL 139 11/16/2018    HDL 48 11/16/2018    LDL 70 11/16/2018    TRIG 105 11/16/2018    CHOLHDLRATIO 4.0 10/17/2013       LIVER ENZYME RESULTS:  Lab Results   Component Value Date    AST 19 11/16/2018    ALT 34 11/16/2018       CBC RESULTS:  Lab Results   Component Value Date    WBC 8.2 11/16/2018    RBC 4.74 11/16/2018    HGB 14.7 11/16/2018    " HCT 45.9 11/16/2018    MCV 97 11/16/2018    MCH 31.0 11/16/2018    MCHC 32.0 11/16/2018    RDW 13.7 11/16/2018     11/16/2018       BMP RESULTS:  Lab Results   Component Value Date     11/16/2018    POTASSIUM 4.5 11/16/2018    CHLORIDE 107 11/16/2018    CO2 28 11/16/2018    ANIONGAP 7 11/16/2018     (H) 11/16/2018    BUN 24 11/16/2018    CR 1.35 (H) 11/16/2018    GFRESTIMATED 51 (L) 11/16/2018    GFRESTBLACK 62 11/16/2018    HARLEEN 8.9 11/16/2018        A1C RESULTS:  Lab Results   Component Value Date    A1C 5.5 09/15/2015       INR RESULTS:  Lab Results   Component Value Date    INR 1.24 (H) 08/02/2018    INR 1.65 (H) 09/14/2015       We greatly appreciate the opportunity to be involved in the care of your patient, Endy Navarro.    Sincerely,  Varinder Cintron MD      CC  Richi Alvarez MD  [address] [place] [address]                                                                       Thank you for allowing me to participate in the care of your patient.      Sincerely,     Varinder Cintron MD     [facility]    cc:   Richi Alvarez MD  [address] [place], [place] [id number]

## 2019-06-19 NOTE — LETTER
6/19/2019      Richi Alvarez MD  [address] [facility] [address]      RE: Endy Navarro       Dear Colleague,    I had the pleasure of seeing Endy Navarro in the [facility].    Service Date: 06/19/2019      HISTORY OF PRESENT ILLNESS:  Endy Navarro, a 78-year-old man with coronary artery disease, aortic stenosis (status post bioprosthetic aortic valve replacement), hypertension, dyslipidemia, paroxysmal atrial fibrillation, normal pressure hydrocephalus (status post  shunt), osteoarthritis, and depression, was seen today at your request for followup.      Since seen by Dr. Morrell in 2017, Mr. Navarro remains free of chest pain, syncope, focal neurologic deficit or sustained palpitation.  The patient uses a walker because of imbalance as a consequence of  normal pressure hydrocephalus, previously treated with a  shunt.  He tries to exercise and follow a prudent diet.  He and his wife measure his blood pressure at home and they tell me that usually his systolic pressures are in the 120  MmHg range, but all systolic pressures documented in our chart clinic visits are above 130 mmHg.      The patient has a solitary kidney, which he states is a consequence of agenesis..  His baseline GFR is in the low 50s.      PAST MEDICAL HISTORY:   1.  Aortic stenosis - status post bioprosthetic aortic valve replacement with Trifecta bioprosthesis 2015.   2.  Coronary artery disease - old history of non-ST segment elevation MI with bare metal stent implantation.  Preoperative coronary angiogram showing 100% occlusion of marginal branch, but no other significant coronary narrowings.  No bypass surgery was performed at the time of valve replacement.   3.  Normal pressure hydrocephalus - status post  shunt.  Residual gait difficulties.  Ambulates with a walker.   4.  Paroxysmal atrial fibrillation - on apixaban and beta blocker therapy.   5.  Dyslipidemia.   6.  Obesity.   7.  Chronic  kidney disease - history of congenital agenesis of 1 kidney.  Baseline GFR of 51 with a creatinine of 1.35.      PHYSICAL EXAMINATION:   GENERAL:  Exam today demonstrates a very pleasant soft spoken 78-year-old man accompanied by his wife.   VITAL SIGNS:  His height is 1.73 meters, his weight is 106.5 kg, his BMI is 35.7.   LUNGS:  Clear to percussion and auscultation.   CARDIOVASCULAR:  Shows a normal S1 with a normal S2 and a crisp prosthetic second heart sound.  There is a soft 1/6 systolic murmur.  There is no diastolic murmur.  His pulses are full and symmetrical.      LABORATORY STUDIES:  His most recent LDL cholesterol was 70 in 2018.  His most recent creatinine was 1.35 with a GFR of 51.      ASSESSMENT:  Mr. Navarro is asymptomatic on guideline-directed medical therapy.  His systolic blood pressure is well above the target of 130 mmHg systolic advised by current   American College of Cardiology guidelines.  I believe that ACE inhibitors will likely provide better protection for his kidney long-term, and I would recommend that we switch the patient from diltiazem to lisinopril and return to have his blood pressure rechecked and compared with his home machine.  If we cannot achieve 130 mm Hg with lisinopril up to 20 mg daily, the next recommendation would be to switch from metoprolol to carvedilol, a more potent second generation beta blocker.       I stressed the benefits of a Mediterranean style weight loss diet and a regular exercise program as nonpharmacologic means  improving blood pressure control.      RECOMMENDATIONS:   1.  Discontinue diltiazem.   2.  Begin lisinopril 10 mg daily.  Would target systolic pressure less than 130 mmHg.  If we cannot achieve the target with lisinopril up to 20 mg daily, would then switch the patient from metoprolol XL to equivalent dose of carvedilol.   3.  The patient will return for followup to the clinic in about 2 weeks with his blood pressure cuff to make certain he is  recording pressures accurately at home and to recheck his BMP after the medication change. If you prefer to manage his blood pressure, feel free to make any changes and follow up you deem appropriate.   4.  Follow up with me in about 1 year.      We appreciate the opportunity to be involved in the care of your patient, Carina Navarro.      cc:   [place] [address]         KALYN DELAROSA MD             D: 2019   T: 2019   MT: PRADEEP      Name:     CARINA NAVARRO   MRN:      [id number]        Account:      [id number]   :      1940           Service Date: 2019      Document: [id number]           Outpatient Encounter Medications as of 2019   Medication Sig Dispense Refill     acetaminophen 650 MG TABS Take 650 mg by mouth every 6 hours 100 tablet      allopurinol (ZYLOPRIM) 100 MG tablet Take 1 tablet (100 mg) by mouth 2 times daily 180 tablet 3     apixaban ANTICOAGULANT (ELIQUIS) 5 MG tablet Take 1 tablet (5 mg) by mouth 2 times daily 180 tablet 3     aspirin EC 81 MG EC tablet Take 1 tablet (81 mg) by mouth daily       atorvastatin (LIPITOR) 40 MG tablet Take 1 tablet (40 mg) by mouth daily 90 tablet 3     finasteride (PROSCAR) 5 MG tablet Take 1 tablet (5 mg) by mouth daily 90 tablet 3     FLUoxetine (PROZAC) 20 MG capsule Take 3 capsules (60 mg) by mouth daily 270 capsule 3     furosemide (LASIX) 20 MG tablet Take 1 tablet (20 mg) by mouth daily 90 tablet 3     lisinopril (PRINIVIL/ZESTRIL) 10 MG tablet Take 1 tablet (10 mg) by mouth daily 30 tablet 11     metoprolol tartrate (LOPRESSOR) 50 MG tablet Take 1 tablet (50 mg) by mouth 2 times daily 180 tablet 3     order for DME Equipment being ordered: 4 wheel walker with seat and brakes 1 Device 0     polyethylene glycol (MIRALAX/GLYCOLAX) packet Take 17 g by mouth daily (Patient taking differently: Take 17 g by mouth as needed ) 7 packet      nitroglycerin  (NITROSTAT) 0.4 MG SL tablet Place 1 tablet (0.4 mg) under the tongue every 5 minutes as needed for chest pain (Patient not taking: Reported on 6/19/2019) 25 tablet 4     [DISCONTINUED] diltiazem (DILACOR XR) 120 MG 24 hr capsule Take 1 capsule (120 mg) by mouth daily 90 capsule 3     No facility-administered encounter medications on file as of 6/19/2019.        Again, thank you for allowing me to participate in the care of your patient.      Sincerely,    Varinder Cintron MD     [facility]

## 2019-06-24 ENCOUNTER — HOSPITAL ENCOUNTER (OUTPATIENT)
Dept: PHYSICAL THERAPY | Facility: CLINIC | Age: 79
Setting detail: THERAPIES SERIES
End: 2019-06-24
Attending: INTERNAL MEDICINE
Payer: MEDICARE

## 2019-06-24 PROCEDURE — 97116 GAIT TRAINING THERAPY: CPT | Mod: GP | Performed by: PHYSICAL THERAPIST

## 2019-06-24 PROCEDURE — 97112 NEUROMUSCULAR REEDUCATION: CPT | Mod: GP | Performed by: PHYSICAL THERAPIST

## 2019-06-26 ENCOUNTER — TELEPHONE (OUTPATIENT)
Dept: INTERNAL MEDICINE | Facility: CLINIC | Age: 79
End: 2019-06-26

## 2019-06-26 DIAGNOSIS — R29.6 FALLING: Primary | ICD-10-CM

## 2019-06-26 DIAGNOSIS — Z98.2 S/P VP SHUNT: ICD-10-CM

## 2019-06-26 NOTE — TELEPHONE ENCOUNTER
"----- Message from Richi Alvarez MD sent at 6/25/2019  5:17 PM CDT -----  Regarding: FW: 4-wheeled walker      ----- Message -----  From: Janis Donnelly PT  Sent: 6/24/2019   1:16 PM  To: Richi Alvarez MD  Subject: 4-wheeled walker                                 I have been working with Ho and he is agreeable to go forward with getting a 4-wheeled walker.  He is also showing good learning on it to be safe in using it.   Since he has Medicare, he will have to obtain this himself (not through /[employer] [facility].)  He and family are working on contacting their insurance company to see which Medical Store they need to use.  He will be needing a specific prescription for it.  Could you please put in an order for a \"4-wheeled walker with brakes and a seat\" under DME order for him?  I will have it printed and give it to him.    Thank you,  Janis Donnelly PT  "

## 2019-07-01 ENCOUNTER — OFFICE VISIT (OUTPATIENT)
Dept: INTERNAL MEDICINE | Facility: CLINIC | Age: 79
End: 2019-07-01
Payer: MEDICARE

## 2019-07-01 VITALS
HEART RATE: 86 BPM | BODY MASS INDEX: 35.73 KG/M2 | TEMPERATURE: 96.5 F | OXYGEN SATURATION: 97 % | SYSTOLIC BLOOD PRESSURE: 132 MMHG | WEIGHT: 235 LBS | RESPIRATION RATE: 16 BRPM | DIASTOLIC BLOOD PRESSURE: 84 MMHG

## 2019-07-01 DIAGNOSIS — Z95.2 S/P AVR (AORTIC VALVE REPLACEMENT): ICD-10-CM

## 2019-07-01 DIAGNOSIS — E78.5 HYPERLIPIDEMIA LDL GOAL <100: ICD-10-CM

## 2019-07-01 DIAGNOSIS — Z98.2 S/P VP SHUNT: ICD-10-CM

## 2019-07-01 DIAGNOSIS — I10 HYPERTENSION GOAL BP (BLOOD PRESSURE) < 140/90: Primary | ICD-10-CM

## 2019-07-01 DIAGNOSIS — R29.6 FALLING: ICD-10-CM

## 2019-07-01 DIAGNOSIS — I48.20 CHRONIC ATRIAL FIBRILLATION (H): ICD-10-CM

## 2019-07-01 LAB
ANION GAP SERPL CALCULATED.3IONS-SCNC: 4 MMOL/L (ref 3–14)
BUN SERPL-MCNC: 34 MG/DL (ref 7–30)
CALCIUM SERPL-MCNC: 8.9 MG/DL (ref 8.5–10.1)
CHLORIDE SERPL-SCNC: 109 MMOL/L (ref 94–109)
CO2 SERPL-SCNC: 29 MMOL/L (ref 20–32)
CREAT SERPL-MCNC: 1.55 MG/DL (ref 0.66–1.25)
GFR SERPL CREATININE-BSD FRML MDRD: 42 ML/MIN/{1.73_M2}
GLUCOSE SERPL-MCNC: 94 MG/DL (ref 70–99)
POTASSIUM SERPL-SCNC: 4.7 MMOL/L (ref 3.4–5.3)
SODIUM SERPL-SCNC: 142 MMOL/L (ref 133–144)

## 2019-07-01 PROCEDURE — 36415 COLL VENOUS BLD VENIPUNCTURE: CPT | Performed by: INTERNAL MEDICINE

## 2019-07-01 PROCEDURE — 80048 BASIC METABOLIC PNL TOTAL CA: CPT | Performed by: INTERNAL MEDICINE

## 2019-07-01 PROCEDURE — 99213 OFFICE O/P EST LOW 20 MIN: CPT | Performed by: INTERNAL MEDICINE

## 2019-07-01 ASSESSMENT — PAIN SCALES - GENERAL: PAINLEVEL: SEVERE PAIN (7)

## 2019-07-01 ASSESSMENT — PATIENT HEALTH QUESTIONNAIRE - PHQ9: SUM OF ALL RESPONSES TO PHQ QUESTIONS 1-9: 8

## 2019-07-01 NOTE — PROGRESS NOTES
Subjective     Endy Navarro is a 78 year old male who presents to clinic today for the following health issues:    HPI   Chief Complaint   Patient presents with     RECHECK     follow up from seeing cardiology     Saw cardiology and stopped his diltiazem and started lisinopril at 10mg a day.  Doing ok, little cough.  Needs to do lab testing today, then has cardiology check in two days.      Hasn't seen neurosurgery yet, appt on the 26th.      Doing PT, using his cane.  Making some progress. Hasn't fallen recently.    Past Medical History:   Diagnosis Date     Anxiety state, unspecified      Atrial fibrillation (H)      Bell's palsy 12/12/1997     Bioprosthetic aortic valve replacement during current hospitalization      Coronary atherosclerosis of unspecified type of vessel, native or graft     coronary artery disease with history of MI and stent placement      Gout, unspecified      Hydrocephalus 2015     Old myocardial infarction 3/1998    Hx of MI     Osteoarthrosis, unspecified whether generalized or localized, unspecified site     Diffuse migratory arthritis     Other and unspecified hyperlipidemia      Paroxysmal supraventricular tachycardia (H)     Hx of PAT     Pure hypercholesterolemia      Stented coronary artery      Current Outpatient Medications   Medication     acetaminophen 650 MG TABS     allopurinol (ZYLOPRIM) 100 MG tablet     apixaban ANTICOAGULANT (ELIQUIS) 5 MG tablet     aspirin EC 81 MG EC tablet     atorvastatin (LIPITOR) 40 MG tablet     finasteride (PROSCAR) 5 MG tablet     FLUoxetine (PROZAC) 20 MG capsule     furosemide (LASIX) 20 MG tablet     lisinopril (PRINIVIL/ZESTRIL) 10 MG tablet     metoprolol tartrate (LOPRESSOR) 50 MG tablet     order for DME     polyethylene glycol (MIRALAX/GLYCOLAX) packet     nitroglycerin (NITROSTAT) 0.4 MG SL tablet     No current facility-administered medications for this visit.      Social History     Tobacco Use     Smoking status: Former Smoker      Smokeless tobacco: Never Used     Tobacco comment: quit 25 yr ago   Substance Use Topics     Alcohol use: Yes     Alcohol/week: 1.8 - 2.4 oz     Types: 3 - 4 Standard drinks or equivalent per week     Comment: couple times a week     Drug use: No     Physical Exam  /84   Pulse 86   Temp 96.5  F (35.8  C) (Temporal)   Resp 16   Wt 106.6 kg (235 lb)   SpO2 97%   BMI 35.73 kg/m    General Appearance-healthy, alert, no distress  Cardiac-regular rate and rhythm  with normal S1, S2 ; no murmur, rub or gallops  Lungs-clear to auscultation  Extremities-no peripheral edema, peripheral pulses normal  Neurological-gait is slow and wide with a cane.      ASSESSMENT:  78-year-old gentleman is here for recheck.  He saw cardiology on the 19th [facility] and is doing well he can be seen yearly.  His blood pressures been up a little bit so he stopped his diltiazem and put him on lisinopril 10 mg a day instead.  He is got a little bit of a cough that we will monitor.  His blood pressure is better at 132/84.  We will get a recheck of his basic metabolic panel today.  He will see cardiology later this week may have to increase to 20 mg.    Recent falls and instability, he has a  shunt, he will be seen neurosurgery on July 26 I will contact neurosurgery to see if they want a head CT before seeing him.  Hopefully if that is adjusted better he will do better in the long-term with his falls.    Electronically signed by Richi Alvarez MD

## 2019-07-02 ENCOUNTER — HOSPITAL ENCOUNTER (OUTPATIENT)
Dept: PHYSICAL THERAPY | Facility: CLINIC | Age: 79
Setting detail: THERAPIES SERIES
End: 2019-07-02
Attending: INTERNAL MEDICINE
Payer: MEDICARE

## 2019-07-02 PROCEDURE — 97112 NEUROMUSCULAR REEDUCATION: CPT | Mod: GP | Performed by: PHYSICAL THERAPIST

## 2019-07-02 PROCEDURE — 97116 GAIT TRAINING THERAPY: CPT | Mod: GP | Performed by: PHYSICAL THERAPIST

## 2019-07-03 ENCOUNTER — OFFICE VISIT (OUTPATIENT)
Dept: CARDIOLOGY | Facility: CLINIC | Age: 79
End: 2019-07-03
Payer: MEDICARE

## 2019-07-03 VITALS
BODY MASS INDEX: 35.95 KG/M2 | HEIGHT: 68 IN | WEIGHT: 237.2 LBS | SYSTOLIC BLOOD PRESSURE: 122 MMHG | OXYGEN SATURATION: 96 % | HEART RATE: 96 BPM | DIASTOLIC BLOOD PRESSURE: 64 MMHG

## 2019-07-03 DIAGNOSIS — I10 HYPERTENSION GOAL BP (BLOOD PRESSURE) < 140/90: ICD-10-CM

## 2019-07-03 DIAGNOSIS — I48.20 CHRONIC ATRIAL FIBRILLATION (H): ICD-10-CM

## 2019-07-03 DIAGNOSIS — Z95.2 S/P AVR (AORTIC VALVE REPLACEMENT): ICD-10-CM

## 2019-07-03 DIAGNOSIS — E78.5 HYPERLIPIDEMIA LDL GOAL <100: ICD-10-CM

## 2019-07-03 LAB
ANION GAP SERPL CALCULATED.3IONS-SCNC: 4 MMOL/L (ref 3–14)
BUN SERPL-MCNC: 28 MG/DL (ref 7–30)
CALCIUM SERPL-MCNC: 8.7 MG/DL (ref 8.5–10.1)
CHLORIDE SERPL-SCNC: 109 MMOL/L (ref 94–109)
CO2 SERPL-SCNC: 29 MMOL/L (ref 20–32)
CREAT SERPL-MCNC: 1.36 MG/DL (ref 0.66–1.25)
GFR SERPL CREATININE-BSD FRML MDRD: 49 ML/MIN/{1.73_M2}
GLUCOSE SERPL-MCNC: 93 MG/DL (ref 70–99)
POTASSIUM SERPL-SCNC: 4.8 MMOL/L (ref 3.4–5.3)
SODIUM SERPL-SCNC: 142 MMOL/L (ref 133–144)

## 2019-07-03 PROCEDURE — 36415 COLL VENOUS BLD VENIPUNCTURE: CPT | Performed by: INTERNAL MEDICINE

## 2019-07-03 PROCEDURE — 80048 BASIC METABOLIC PNL TOTAL CA: CPT | Performed by: INTERNAL MEDICINE

## 2019-07-03 PROCEDURE — 99214 OFFICE O/P EST MOD 30 MIN: CPT | Performed by: PHYSICIAN ASSISTANT

## 2019-07-03 ASSESSMENT — MIFFLIN-ST. JEOR: SCORE: 1765.43

## 2019-07-03 NOTE — PATIENT INSTRUCTIONS
Today's Plan:   1) Continue with current medications.   2) Follow up with us in 1 year.     If you have questions or concerns please call clinic at [phone].    Please call [phone] for scheduling.       It was a pleasure seeing you today!     Reminder: Please bring in all current medications, over the counter supplements and vitamin bottles to your next appointment.    Anjel Greenwood PA-C  7/3/2019

## 2019-07-03 NOTE — LETTER
7/3/2019    Richi Alvarez MD  [address] [facility] [address]    RE: Endy Navarro       Dear Colleague,    I had the pleasure of seeing Endy Navarro in the [facility].    Primary Cardiologist: Dr. Cintron    Reason for Visit: Follow up after addition of lisinopril    History of Present Illness:   This is a pleasant 78-year-old gentleman with past medical history notable for coronary artery disease (remote history of bare-metal stenting, pre-CABG coronary angiogram showed 100% occlusion of marginal branch but no other significant lesions), history of bioprosthetic aortic valve replacement (received Trifecta in 2015), history of hydrocephalus (status post  shunt; associated gait difficulties), paroxysmal atrial fibrillation (on apixaban for CVA prophylaxis, under rate control strategy with metoprolol), dyslipidemia, solitary kidney, and chronic kidney disease (creatinine baseline appears to be around 1.3).    He returns to clinic today with his spouse, stating he is doing well.  He denies any symptoms of chest discomfort, shortness of breath, palpitations, peripheral edema, orthopnea (though this is somewhat difficult as he sleeps on a recliner due to significant back discomfort), or PND.  He has had no bleeding issues.    Assessment and Plan:   This is a pleasant 78-year-old gentleman with past medical history notable for coronary artery disease (remote history of bare-metal stenting, pre-CABG coronary angiogram showed 100% occlusion of marginal branch but no other significant lesions), history of bioprosthetic aortic valve replacement (received trifecta in 2015), history of hydrocephalus (status post  shunt; associated gait difficulties), paroxysmal atrial fibrillation (on apixaban for CVA prophylaxis, under rate control strategy with metoprolol), dyslipidemia, solitary kidney, and chronic kidney disease (creatinine baseline appears to be around 1.3).    He returns  today for reevaluation after initiation of lisinopril 10 mg for renal protection as well as more optimal blood pressure control.  His repeat basic metabolic panel several days ago showed slight increase in creatinine and BUN.  This was communicated to Dr. Ray's who recommended repeating basic metabolic panel today.  If his blood pressure was better and his kidney function was back to his baseline he did not want to make any further changes and keep him on the current dose of lisinopril.    His repeat basic metabolic panel today does show that his renal function is back to his baseline.  His blood pressure is also controlled.  We will keep him on the current dose of lisinopril.  He does show me to [facility] blood pressure log and his blood pressures appear to be very well controlled.  His heart rate here was slightly high at 96 bpm but at home his heart rates ranged between 70-80 at rest.  And therefore we will continue with the current medications without any changes.  He has no symptoms to suggest heart failure, arrhythmia, or ischemia.  He is already arranged to follow-up with us in 1 year.    Thank you for allowing me to participate in the care of this pleasant patient today.      This note was completed in part using Dragon voice recognition software. Although reviewed after completion, some word and grammatical errors may occur.    Orders this Visit:  No orders of the defined types were placed in this encounter.    No orders of the defined types were placed in this encounter.    There are no discontinued medications.      Encounter Diagnoses   Name Primary?     S/P AVR (aortic valve replacement)      Hypertension goal BP (blood pressure) < 140/90      Hyperlipidemia LDL goal <100      Chronic atrial fibrillation (H)        CURRENT MEDICATIONS:  Current Outpatient Medications   Medication Sig Dispense Refill     acetaminophen 650 MG TABS Take 650 mg by mouth every 6 hours 100 tablet      allopurinol (ZYLOPRIM)  100 MG tablet Take 1 tablet (100 mg) by mouth 2 times daily 180 tablet 3     apixaban ANTICOAGULANT (ELIQUIS) 5 MG tablet Take 1 tablet (5 mg) by mouth 2 times daily 180 tablet 3     aspirin EC 81 MG EC tablet Take 1 tablet (81 mg) by mouth daily       atorvastatin (LIPITOR) 40 MG tablet Take 1 tablet (40 mg) by mouth daily 90 tablet 3     finasteride (PROSCAR) 5 MG tablet Take 1 tablet (5 mg) by mouth daily 90 tablet 3     FLUoxetine (PROZAC) 20 MG capsule Take 3 capsules (60 mg) by mouth daily 270 capsule 3     furosemide (LASIX) 20 MG tablet Take 1 tablet (20 mg) by mouth daily 90 tablet 3     lisinopril (PRINIVIL/ZESTRIL) 10 MG tablet Take 1 tablet (10 mg) by mouth daily 30 tablet 11     metoprolol tartrate (LOPRESSOR) 50 MG tablet Take 1 tablet (50 mg) by mouth 2 times daily 180 tablet 3     nitroglycerin (NITROSTAT) 0.4 MG SL tablet Place 1 tablet (0.4 mg) under the tongue every 5 minutes as needed for chest pain 25 tablet 4     order for DME Equipment being ordered: 4 wheeled walker with brakes and seat 1 Device 0     polyethylene glycol (MIRALAX/GLYCOLAX) packet Take 17 g by mouth daily (Patient taking differently: Take 17 g by mouth as needed ) 7 packet        ALLERGIES     Allergies   Allergen Reactions     Seroquel [Quetiapine] Other (See Comments)     Felt funny       PAST MEDICAL HISTORY:  Past Medical History:   Diagnosis Date     Anxiety state, unspecified      Atrial fibrillation (H)      Bell's palsy 12/12/1997     Bioprosthetic aortic valve replacement during current hospitalization      Coronary atherosclerosis of unspecified type of vessel, native or graft     coronary artery disease with history of MI and stent placement      Gout, unspecified      Hydrocephalus 2015     Old myocardial infarction 3/1998    Hx of MI     Osteoarthrosis, unspecified whether generalized or localized, unspecified site     Diffuse migratory arthritis     Other and unspecified hyperlipidemia      Paroxysmal  supraventricular tachycardia (H)     Hx of PAT     Pure hypercholesterolemia      Stented coronary artery        PAST SURGICAL HISTORY:  Past Surgical History:   Procedure Laterality Date     C EXPLORATORY OF ABDOMEN  1/25/2007    Exploratory laparotomy.  Lysis of adhesions with reduction of internal hernia.     C ROTATOR CUFF STRAP      s/p rotator cuff surgery     COLONOSCOPY N/A 12/5/2016    Procedure: COMBINED COLONOSCOPY, SINGLE OR MULTIPLE BIOPSY/POLYPECTOMY BY BIOPSY;  Surgeon: Benjamin Cavazos MD;  Location: [facility]     HC COLONOSCOPY THRU STOMA, DIAGNOSTIC  8/23/2004     HC KNEE SCOPE,MED/LAT MENISCUS REPAIR       HC REMOVE TONSILS/ADENOIDS,<11 Y/O      unsure of age     Hydrocephalus  2015    shunt placed     INJECT EPIDURAL LUMBAR N/A 6/8/2017    Procedure: INJECT EPIDURAL LUMBAR;  lumbar epidural steroid injection Left Lumbar 5 to sacral 1;  Surgeon: Pawan Davenport MD;  Location: [facility]     REMOVAL OF SPERM DUCT(S)      Vasectomy     REPLACE VALVE AORTIC N/A 9/14/2015    Procedure: REPLACE VALVE AORTIC;  Surgeon: Sang Chan MD;  Location:  OR       FAMILY HISTORY:  Family History   Problem Relation Age of Onset     Cerebrovascular Disease Mother      Cerebrovascular Disease Father      Hypertension Father      C.A.D. Sister      C.A.D. Brother      Diabetes Maternal Aunt      Prostate Cancer Brother      Cancer - colorectal No family hx of        SOCIAL HISTORY:  Social History     Socioeconomic History     Marital status:      Spouse name: Not on file     Number of children: Not on file     Years of education: Not on file     Highest education level: Not on file   Occupational History     Occupation: Laundry Mat   Social Needs     Financial resource strain: Not on file     Food insecurity:     Worry: Not on file     Inability: Not on file     Transportation needs:     Medical: Not on file     Non-medical: Not on file   Tobacco Use     Smoking status: Former Smoker     Smokeless  tobacco: Never Used     Tobacco comment: quit 25 yr ago   Substance and Sexual Activity     Alcohol use: Yes     Alcohol/week: 1.8 - 2.4 oz     Types: 3 - 4 Standard drinks or equivalent per week     Comment: couple times a week     Drug use: No     Sexual activity: Yes     Partners: Female   Lifestyle     Physical activity:     Days per week: Not on file     Minutes per session: Not on file     Stress: Not on file   Relationships     Social connections:     Talks on phone: Not on file     Gets together: Not on file     Attends Mandaeism service: Not on file     Active member of club or organization: Not on file     Attends meetings of clubs or organizations: Not on file     Relationship status: Not on file     Intimate partner violence:     Fear of current or ex partner: Not on file     Emotionally abused: Not on file     Physically abused: Not on file     Forced sexual activity: Not on file   Other Topics Concern      Service Yes     Blood Transfusions No     Caffeine Concern No     Comment: coffee- 3-4 cups daily     Occupational Exposure No     Hobby Hazards No     Sleep Concern Yes     Comment: trouble sleeping     Stress Concern No     Weight Concern No     Special Diet No     Back Care Yes     Comment: Low back pain with riding in car     Exercise No     Bike Helmet Not Asked     Seat Belt Yes     Self-Exams Yes     Parent/sibling w/ CABG, MI or angioplasty before 65F 55M? Not Asked   Social History Narrative     Not on file       Review of Systems:  Skin:  Negative     Eyes:  Positive for glasses  ENT:  Positive for hearing loss  Respiratory:  Positive for dyspnea on exertion  Cardiovascular:  Negative for;palpitations;chest pain;edema;lightheadedness;dizziness    Gastroenterology: Negative    Genitourinary:  Negative    Musculoskeletal:  Positive for    Neurologic:  Negative    Psychiatric:  Positive for sleep disturbances;depression  Heme/Lymph/Imm:  Negative    Endocrine:  Negative      Physical  "Exam:  Vitals: /64 (BP Location: Right arm, Patient Position: Fowlers, Cuff Size: Adult Large)   Pulse 96   Ht 1.727 m (5' 8\")   Wt 107.6 kg (237 lb 3.2 oz)   SpO2 96%   BMI 36.07 kg/m        GEN:  NAD  NECK: No JVD  C/V: Irregularly irregular rhythm but normal rate.  1 out of 6 systolic ejection murmur auscultated at left upper sternal border.  RESP: Clear to auscultation bilaterally without wheezing, rales, or rhonchi.  GI: Abdomen soft, nontender, nondistended.   EXTREM: No LE edema.   NEURO: Alert and oriented, cooperative. No obvious focal deficits.   PSYCH: Normal affect.  SKIN: Warm and dry.       Recent Lab Results:  LIPID RESULTS:  Lab Results   Component Value Date    CHOL 139 11/16/2018    HDL 48 11/16/2018    LDL 70 11/16/2018    TRIG 105 11/16/2018    CHOLHDLRATIO 4.0 10/17/2013       LIVER ENZYME RESULTS:  Lab Results   Component Value Date    AST 19 11/16/2018    ALT 34 11/16/2018       CBC RESULTS:  Lab Results   Component Value Date    WBC 8.2 11/16/2018    RBC 4.74 11/16/2018    HGB 14.7 11/16/2018    HCT 45.9 11/16/2018    MCV 97 11/16/2018    MCH 31.0 11/16/2018    MCHC 32.0 11/16/2018    RDW 13.7 11/16/2018     11/16/2018       BMP RESULTS:  Lab Results   Component Value Date     07/03/2019    POTASSIUM 4.8 07/03/2019    CHLORIDE 109 07/03/2019    CO2 29 07/03/2019    ANIONGAP 4 07/03/2019    GLC 93 07/03/2019    BUN 28 07/03/2019    CR 1.36 (H) 07/03/2019    GFRESTIMATED 49 (L) 07/03/2019    GFRESTBLACK 57 (L) 07/03/2019    HARLEEN 8.7 07/03/2019        A1C RESULTS:  Lab Results   Component Value Date    A1C 5.5 09/15/2015       INR RESULTS:  Lab Results   Component Value Date    INR 1.24 (H) 08/02/2018    INR 1.65 (H) 09/14/2015         Thank you for allowing me to participate in the care of your patient.    Sincerely,     Anjel Greenwood PA-C     [facility]    "

## 2019-07-03 NOTE — PROGRESS NOTES
Primary Cardiologist: Dr. Cintron    Reason for Visit: Follow up after addition of lisinopril    History of Present Illness:   This is a pleasant 78-year-old gentleman with past medical history notable for coronary artery disease (remote history of bare-metal stenting, pre-CABG coronary angiogram showed 100% occlusion of marginal branch but no other significant lesions), history of bioprosthetic aortic valve replacement (received Trifecta in 2015), history of hydrocephalus (status post  shunt; associated gait difficulties), paroxysmal atrial fibrillation (on apixaban for CVA prophylaxis, under rate control strategy with metoprolol), dyslipidemia, solitary kidney, and chronic kidney disease (creatinine baseline appears to be around 1.3).    He returns to clinic today with his spouse, stating he is doing well.  He denies any symptoms of chest discomfort, shortness of breath, palpitations, peripheral edema, orthopnea (though this is somewhat difficult as he sleeps on a recliner due to significant back discomfort), or PND.  He has had no bleeding issues.    Assessment and Plan:   This is a pleasant 78-year-old gentleman with past medical history notable for coronary artery disease (remote history of bare-metal stenting, pre-CABG coronary angiogram showed 100% occlusion of marginal branch but no other significant lesions), history of bioprosthetic aortic valve replacement (received trifecta in 2015), history of hydrocephalus (status post  shunt; associated gait difficulties), paroxysmal atrial fibrillation (on apixaban for CVA prophylaxis, under rate control strategy with metoprolol), dyslipidemia, solitary kidney, and chronic kidney disease (creatinine baseline appears to be around 1.3).    He returns today for reevaluation after initiation of lisinopril 10 mg for renal protection as well as more optimal blood pressure control.  His repeat basic metabolic panel several days ago showed slight increase in creatinine  and BUN.  This was communicated to Dr. Ray's who recommended repeating basic metabolic panel today.  If his blood pressure was better and his kidney function was back to his baseline he did not want to make any further changes and keep him on the current dose of lisinopril.    His repeat basic metabolic panel today does show that his renal function is back to his baseline.  His blood pressure is also controlled.  We will keep him on the current dose of lisinopril.  He does show me to [facility] blood pressure log and his blood pressures appear to be very well controlled.  His heart rate here was slightly high at 96 bpm but at home his heart rates ranged between 70-80 at rest.  And therefore we will continue with the current medications without any changes.  He has no symptoms to suggest heart failure, arrhythmia, or ischemia.  He is already arranged to follow-up with us in 1 year.    Thank you for allowing me to participate in the care of this pleasant patient today.      This note was completed in part using Dragon voice recognition software. Although reviewed after completion, some word and grammatical errors may occur.    Orders this Visit:  No orders of the defined types were placed in this encounter.    No orders of the defined types were placed in this encounter.    There are no discontinued medications.      Encounter Diagnoses   Name Primary?     S/P AVR (aortic valve replacement)      Hypertension goal BP (blood pressure) < 140/90      Hyperlipidemia LDL goal <100      Chronic atrial fibrillation (H)        CURRENT MEDICATIONS:  Current Outpatient Medications   Medication Sig Dispense Refill     acetaminophen 650 MG TABS Take 650 mg by mouth every 6 hours 100 tablet      allopurinol (ZYLOPRIM) 100 MG tablet Take 1 tablet (100 mg) by mouth 2 times daily 180 tablet 3     apixaban ANTICOAGULANT (ELIQUIS) 5 MG tablet Take 1 tablet (5 mg) by mouth 2 times daily 180 tablet 3     aspirin EC 81 MG EC tablet Take 1  tablet (81 mg) by mouth daily       atorvastatin (LIPITOR) 40 MG tablet Take 1 tablet (40 mg) by mouth daily 90 tablet 3     finasteride (PROSCAR) 5 MG tablet Take 1 tablet (5 mg) by mouth daily 90 tablet 3     FLUoxetine (PROZAC) 20 MG capsule Take 3 capsules (60 mg) by mouth daily 270 capsule 3     furosemide (LASIX) 20 MG tablet Take 1 tablet (20 mg) by mouth daily 90 tablet 3     lisinopril (PRINIVIL/ZESTRIL) 10 MG tablet Take 1 tablet (10 mg) by mouth daily 30 tablet 11     metoprolol tartrate (LOPRESSOR) 50 MG tablet Take 1 tablet (50 mg) by mouth 2 times daily 180 tablet 3     nitroglycerin (NITROSTAT) 0.4 MG SL tablet Place 1 tablet (0.4 mg) under the tongue every 5 minutes as needed for chest pain 25 tablet 4     order for DME Equipment being ordered: 4 wheeled walker with brakes and seat 1 Device 0     polyethylene glycol (MIRALAX/GLYCOLAX) packet Take 17 g by mouth daily (Patient taking differently: Take 17 g by mouth as needed ) 7 packet        ALLERGIES     Allergies   Allergen Reactions     Seroquel [Quetiapine] Other (See Comments)     Felt funny       PAST MEDICAL HISTORY:  Past Medical History:   Diagnosis Date     Anxiety state, unspecified      Atrial fibrillation (H)      Bell's palsy 12/12/1997     Bioprosthetic aortic valve replacement during current hospitalization      Coronary atherosclerosis of unspecified type of vessel, native or graft     coronary artery disease with history of MI and stent placement      Gout, unspecified      Hydrocephalus 2015     Old myocardial infarction 3/1998    Hx of MI     Osteoarthrosis, unspecified whether generalized or localized, unspecified site     Diffuse migratory arthritis     Other and unspecified hyperlipidemia      Paroxysmal supraventricular tachycardia (H)     Hx of PAT     Pure hypercholesterolemia      Stented coronary artery        PAST SURGICAL HISTORY:  Past Surgical History:   Procedure Laterality Date     C EXPLORATORY OF ABDOMEN  1/25/2007     Exploratory laparotomy.  Lysis of adhesions with reduction of internal hernia.     C ROTATOR CUFF STRAP      s/p rotator cuff surgery     COLONOSCOPY N/A 12/5/2016    Procedure: COMBINED COLONOSCOPY, SINGLE OR MULTIPLE BIOPSY/POLYPECTOMY BY BIOPSY;  Surgeon: Benjamin Cavazos MD;  Location: [facility]     HC COLONOSCOPY THRU STOMA, DIAGNOSTIC  8/23/2004     HC KNEE SCOPE,MED/LAT MENISCUS REPAIR       HC REMOVE TONSILS/ADENOIDS,<13 Y/O      unsure of age     Hydrocephalus  2015    shunt placed     INJECT EPIDURAL LUMBAR N/A 6/8/2017    Procedure: INJECT EPIDURAL LUMBAR;  lumbar epidural steroid injection Left Lumbar 5 to sacral 1;  Surgeon: Pawan Davenport MD;  Location: [facility]     REMOVAL OF SPERM DUCT(S)      Vasectomy     REPLACE VALVE AORTIC N/A 9/14/2015    Procedure: REPLACE VALVE AORTIC;  Surgeon: Sang Chan MD;  Location:  OR       FAMILY HISTORY:  Family History   Problem Relation Age of Onset     Cerebrovascular Disease Mother      Cerebrovascular Disease Father      Hypertension Father      C.A.D. Sister      C.A.D. Brother      Diabetes Maternal Aunt      Prostate Cancer Brother      Cancer - colorectal No family hx of        SOCIAL HISTORY:  Social History     Socioeconomic History     Marital status:      Spouse name: Not on file     Number of children: Not on file     Years of education: Not on file     Highest education level: Not on file   Occupational History     Occupation: Laundry Mat   Social Needs     Financial resource strain: Not on file     Food insecurity:     Worry: Not on file     Inability: Not on file     Transportation needs:     Medical: Not on file     Non-medical: Not on file   Tobacco Use     Smoking status: Former Smoker     Smokeless tobacco: Never Used     Tobacco comment: quit 25 yr ago   Substance and Sexual Activity     Alcohol use: Yes     Alcohol/week: 1.8 - 2.4 oz     Types: 3 - 4 Standard drinks or equivalent per week     Comment: couple times a week  "    Drug use: No     Sexual activity: Yes     Partners: Female   Lifestyle     Physical activity:     Days per week: Not on file     Minutes per session: Not on file     Stress: Not on file   Relationships     Social connections:     Talks on phone: Not on file     Gets together: Not on file     Attends Congregation service: Not on file     Active member of club or organization: Not on file     Attends meetings of clubs or organizations: Not on file     Relationship status: Not on file     Intimate partner violence:     Fear of current or ex partner: Not on file     Emotionally abused: Not on file     Physically abused: Not on file     Forced sexual activity: Not on file   Other Topics Concern      Service Yes     Blood Transfusions No     Caffeine Concern No     Comment: coffee- 3-4 cups daily     Occupational Exposure No     Hobby Hazards No     Sleep Concern Yes     Comment: trouble sleeping     Stress Concern No     Weight Concern No     Special Diet No     Back Care Yes     Comment: Low back pain with riding in car     Exercise No     Bike Helmet Not Asked     Seat Belt Yes     Self-Exams Yes     Parent/sibling w/ CABG, MI or angioplasty before 65F 55M? Not Asked   Social History Narrative     Not on file       Review of Systems:  Skin:  Negative     Eyes:  Positive for glasses  ENT:  Positive for hearing loss  Respiratory:  Positive for dyspnea on exertion  Cardiovascular:  Negative for;palpitations;chest pain;edema;lightheadedness;dizziness    Gastroenterology: Negative    Genitourinary:  Negative    Musculoskeletal:  Positive for    Neurologic:  Negative    Psychiatric:  Positive for sleep disturbances;depression  Heme/Lymph/Imm:  Negative    Endocrine:  Negative      Physical Exam:  Vitals: /64 (BP Location: Right arm, Patient Position: Fowlers, Cuff Size: Adult Large)   Pulse 96   Ht 1.727 m (5' 8\")   Wt 107.6 kg (237 lb 3.2 oz)   SpO2 96%   BMI 36.07 kg/m       GEN:  NAD  NECK: No " JVD  C/V: Irregularly irregular rhythm but normal rate.  1 out of 6 systolic ejection murmur auscultated at left upper sternal border.  RESP: Clear to auscultation bilaterally without wheezing, rales, or rhonchi.  GI: Abdomen soft, nontender, nondistended.   EXTREM: No LE edema.   NEURO: Alert and oriented, cooperative. No obvious focal deficits.   PSYCH: Normal affect.  SKIN: Warm and dry.       Recent Lab Results:  LIPID RESULTS:  Lab Results   Component Value Date    CHOL 139 11/16/2018    HDL 48 11/16/2018    LDL 70 11/16/2018    TRIG 105 11/16/2018    CHOLHDLRATIO 4.0 10/17/2013       LIVER ENZYME RESULTS:  Lab Results   Component Value Date    AST 19 11/16/2018    ALT 34 11/16/2018       CBC RESULTS:  Lab Results   Component Value Date    WBC 8.2 11/16/2018    RBC 4.74 11/16/2018    HGB 14.7 11/16/2018    HCT 45.9 11/16/2018    MCV 97 11/16/2018    MCH 31.0 11/16/2018    MCHC 32.0 11/16/2018    RDW 13.7 11/16/2018     11/16/2018       BMP RESULTS:  Lab Results   Component Value Date     07/03/2019    POTASSIUM 4.8 07/03/2019    CHLORIDE 109 07/03/2019    CO2 29 07/03/2019    ANIONGAP 4 07/03/2019    GLC 93 07/03/2019    BUN 28 07/03/2019    CR 1.36 (H) 07/03/2019    GFRESTIMATED 49 (L) 07/03/2019    GFRESTBLACK 57 (L) 07/03/2019    HARLEEN 8.7 07/03/2019        A1C RESULTS:  Lab Results   Component Value Date    A1C 5.5 09/15/2015       INR RESULTS:  Lab Results   Component Value Date    INR 1.24 (H) 08/02/2018    INR 1.65 (H) 09/14/2015           Anjel Greenwood PA-C   July 3, 2019

## 2019-07-17 ENCOUNTER — TELEPHONE (OUTPATIENT)
Dept: CARDIOLOGY | Facility: CLINIC | Age: 79
End: 2019-07-17

## 2019-07-17 DIAGNOSIS — I10 HYPERTENSION GOAL BP (BLOOD PRESSURE) < 140/90: Primary | ICD-10-CM

## 2019-07-17 RX ORDER — CARVEDILOL 12.5 MG/1
12.5 TABLET ORAL 2 TIMES DAILY WITH MEALS
Qty: 60 TABLET | Refills: 11 | Status: SHIPPED | OUTPATIENT
Start: 2019-07-17 | End: 2019-11-18

## 2019-07-17 NOTE — TELEPHONE ENCOUNTER
Spoke with patient and patient's spouse regarding medication change. New script for carvedilol 12.5mg BID sent to [facility] pharmacy in [place]. Patient will continue to check blood pressures and notify clinic if they continue to run high.       Can we switch metoprolol to Carvedilol 12.5 mg BID? Continue to check BP. If still elevated after 1 week, we will further increase it to 25 mg BID.     Thanks,     Anjel

## 2019-07-17 NOTE — TELEPHONE ENCOUNTER
Patient calling stating he does not think the lisinopril 10mg daily is helping control his blood pressure. Patient stated he will take his blood pressure about 1-1.5 hour after taking his medications in the morning and blood pressure will be around 160's/85's. Confirmed with patient that he is taking lisinopril 10mg daily in the morning. Patient said when he checks blood pressure later in the afternoon he will get a reading like 129/80. Patient states he is feeling well other then the blood pressure readings be inconsistent. Will route to DHARA Syed who last saw patient in clinic on 7/3/19.

## 2019-07-26 ENCOUNTER — OFFICE VISIT (OUTPATIENT)
Dept: NEUROSURGERY | Facility: CLINIC | Age: 79
End: 2019-07-26
Attending: INTERNAL MEDICINE
Payer: MEDICARE

## 2019-07-26 VITALS
BODY MASS INDEX: 35.9 KG/M2 | TEMPERATURE: 97.8 F | DIASTOLIC BLOOD PRESSURE: 86 MMHG | WEIGHT: 236.9 LBS | SYSTOLIC BLOOD PRESSURE: 132 MMHG | HEIGHT: 68 IN

## 2019-07-26 DIAGNOSIS — G91.2 NPH (NORMAL PRESSURE HYDROCEPHALUS) (H): ICD-10-CM

## 2019-07-26 DIAGNOSIS — Z98.2 S/P VP SHUNT: Primary | ICD-10-CM

## 2019-07-26 PROCEDURE — 99214 OFFICE O/P EST MOD 30 MIN: CPT | Performed by: PHYSICIAN ASSISTANT

## 2019-07-26 ASSESSMENT — MIFFLIN-ST. JEOR: SCORE: 1764.07

## 2019-07-26 ASSESSMENT — PAIN SCALES - GENERAL: PAINLEVEL: MODERATE PAIN (5)

## 2019-07-26 NOTE — LETTER
"    7/26/2019         RE: Endy Navarro  [address] [facility] [address]        Dear Colleague,    Thank you for referring your patient, Endy Navarro, to the [facility]. Please see a copy of my visit note below.    /86   Temp 97.8  F (36.6  C) (Temporal)   Ht 1.727 m (5' 8\")   Wt 107.5 kg (236 lb 14.4 oz)   BMI 36.02 kg/m     Body mass index is 36.02 kg/m .  5' 8\"  236 lbs 14.4 oz        Felecia Morris MA on 7/26/2019 at 2:28 PM      [facility]  Neurosurgery:    HPI: Endy Navarro is a 78 year old male who presents for follow up in regards to recent increased falls.  He is status post  shunt placement for NPH about 4 years ago.  This was done in [place].  In looking through the records, I see that he was seen by Dr. Sebastian in June 2018.  His shunt was then at 2.0.  He has not had any changes to it that he knows of.  He states that he has had increased falls and imbalance over the last few months.  No changes in vision or with any bladder incontinence.    The patient denies any changes in bowel or bladder function, or any new problems with balance or coordination.     ROS negative for fever, chills, chest pain or shortness of breath.     Exam:  /86   Temp 97.8  F (36.6  C) (Temporal)   Ht 1.727 m (5' 8\")   Wt 107.5 kg (236 lb 14.4 oz)   BMI 36.02 kg/m     Constitutional:  Alert, well nourished, NAD.  HEENT: Normocephalic, atraumatic.   Pulm:  Without shortness of breath, normal effort.   CV:  No pitting edema of BLE.   Skin: No rashes or lesions.     Neurological:  Awake  Alert  Oriented x 3  CN II-XII intact.     Motor exam:  Strength is equal in all extremities.      Able to spontaneously move U/E bilaterally  Sensation intact throughout all U/E dermatomes    Imaging:       A/P:   1) s/p  shunt for NPH  2) increased falls  3)     I did have a discussion with the patient and his wife regarding his symptoms.  At this point, we do not have " any advanced imaging available for comparison.  He did have a CT scan about 1 year ago, at which point no recommendations were made as far as adjusting the shunt setting.  We will check a new CT scan, and follow-up with him by phone.  If there is an adjustment recommended to the shunt we will have him schedule an appointment and we will make the adjustment then.    The patient voiced agreement and understanding of the plan of care. All questions were answered today.         Guy aJcobsen PA-C  [facility]  [place] [address]    Tel [phone]    Again, thank you for allowing me to participate in the care of your patient.        Sincerely,        Guy Jacobsen PA-C

## 2019-07-26 NOTE — PROGRESS NOTES
"[facility]  Neurosurgery:    HPI: Endy Navarro is a 78 year old male who presents for follow up in regards to recent increased falls.  He is status post  shunt placement for NPH about 4 years ago.  This was done in [place].  In looking through the records, I see that he was seen by Dr. Sebastian in June 2018.  His shunt was then at 2.0.  He has not had any changes to it that he knows of.  He states that he has had increased falls and imbalance over the last few months.  No changes in vision or with any bladder incontinence.    The patient denies any changes in bowel or bladder function, or any new problems with balance or coordination.     ROS negative for fever, chills, chest pain or shortness of breath.     Exam:  /86   Temp 97.8  F (36.6  C) (Temporal)   Ht 1.727 m (5' 8\")   Wt 107.5 kg (236 lb 14.4 oz)   BMI 36.02 kg/m    Constitutional:  Alert, well nourished, NAD.  HEENT: Normocephalic, atraumatic.   Pulm:  Without shortness of breath, normal effort.   CV:  No pitting edema of BLE.   Skin: No rashes or lesions.     Neurological:  Awake  Alert  Oriented x 3  CN II-XII intact.     Motor exam:  Strength is equal in all extremities.      Able to spontaneously move U/E bilaterally  Sensation intact throughout all U/E dermatomes    Imaging:       A/P:   1) s/p  shunt for NPH  2) increased falls  3)     I did have a discussion with the patient and his wife regarding his symptoms.  At this point, we do not have any advanced imaging available for comparison.  He did have a CT scan about 1 year ago, at which point no recommendations were made as far as adjusting the shunt setting.  We will check a new CT scan, and follow-up with him by phone.  If there is an adjustment recommended to the shunt we will have him schedule an appointment and we will make the adjustment then.    The patient voiced agreement and understanding of the plan of care. All questions were answered today.         Guy SOLO" Delmar PACHECO  [facility]  [facility]  [address] [address]    Tel [phone]

## 2019-07-26 NOTE — PROGRESS NOTES
"/86   Temp 97.8  F (36.6  C) (Temporal)   Ht 1.727 m (5' 8\")   Wt 107.5 kg (236 lb 14.4 oz)   BMI 36.02 kg/m    Body mass index is 36.02 kg/m .  5' 8\"  236 lbs 14.4 oz        Felecia Morris MA on 7/26/2019 at 2:28 PM    "

## 2019-08-02 ENCOUNTER — HOSPITAL ENCOUNTER (OUTPATIENT)
Dept: PHYSICAL THERAPY | Facility: CLINIC | Age: 79
Setting detail: THERAPIES SERIES
End: 2019-08-02
Attending: INTERNAL MEDICINE
Payer: MEDICARE

## 2019-08-02 PROCEDURE — 97112 NEUROMUSCULAR REEDUCATION: CPT | Mod: GP | Performed by: PHYSICAL THERAPIST

## 2019-08-02 PROCEDURE — 97530 THERAPEUTIC ACTIVITIES: CPT | Mod: GP | Performed by: PHYSICAL THERAPIST

## 2019-08-02 PROCEDURE — 97116 GAIT TRAINING THERAPY: CPT | Mod: GP | Performed by: PHYSICAL THERAPIST

## 2019-08-02 NOTE — PROGRESS NOTES
"Outpatient Physical Therapy Discharge Note     Patient: Endy Navarro  : 1940    Beginning/End Dates of Reporting Period:  2019 to 2019    Referring Provider: Dr Richi Alvarez    Therapy Diagnosis: imbalanced, decreased gait stability, needs training on new AD     Client Self Report: I got a walker (4-wheeled.)  It's been going prettty well.      Objective Measurements:     Objective Measure: observation/symptoms  Details: comes in using his 4-wheeled walker--it is adjusted to fit him.   Objective Measure: he also reports sleeping in bed really causes pain with both legs \"kicked up\" when he wakes and he jumps and legs kick and he usually can't stay there all night.    Details: He also reports feelign comfortable using the cane at times and only the shopping cart at store at times as well.  He is able to make safe choices on how much assist he needs at various times.        Goals:  Goal Identifier walker   Goal Description Pt will have acquired his new 4-wheeled walker with assistance of PT to get order and coordinate where/how/what to get in order to be able to have it properly fit by PT and train with it.   Target Date 19   Date Met  19   Progress:     Goal Identifier safe gait   Goal Description Ho will be able to safely negotiate in community and around home with his new 4-wheeled walker, which will allow for proper sitting rests due to caridac condition and safety with mobility to reduce risk of falls.   Target Date 19   Date Met  19   Progress:     Goal Identifier balance   Goal Description Ho will be completing a daily balance home program and be able to report no falls x 6 weeks.    Target Date 19   Date Met  19   Progress:       Progress Toward Goals:   Progress this reporting period: Ho is showing good progress with stability in his gait and ability to choose wisely on his appropriate assistive device use.  He has obtained a 4 wheeled walker and it " is adjusted properly for him.  He uses it appropriately.  He can also be steady with his cane at times.  He continues a daily light balance program of 1 or 2 ex a day or at least frequently throughout the week to keep challenging his balance.  He also has left knee pain which he helps control with an ace wrap, however, will continue to talk with his MD on possible PT in future for back pain and referred leg pain--we did work on having him to try get more extension into his back throughout the day and prop better in resting and sleep.  He is to work on not leaning to left elbow when he sits as it shows left chain tightness into back and hip.     Plan:  Discharge from therapy.    Discharge:    Reason for Discharge: Patient has met all goals.    Equipment Issued: he obtained a 4 wheeled walker    Discharge Plan: Patient to continue home program.

## 2019-08-20 ENCOUNTER — TELEPHONE (OUTPATIENT)
Dept: CARDIOLOGY | Facility: CLINIC | Age: 79
End: 2019-08-20

## 2019-08-26 DIAGNOSIS — I10 HYPERTENSION GOAL BP (BLOOD PRESSURE) < 140/90: ICD-10-CM

## 2019-08-26 DIAGNOSIS — E78.5 HYPERLIPIDEMIA LDL GOAL <100: ICD-10-CM

## 2019-08-26 DIAGNOSIS — I48.20 CHRONIC ATRIAL FIBRILLATION (H): ICD-10-CM

## 2019-08-26 DIAGNOSIS — Z95.2 S/P AVR (AORTIC VALVE REPLACEMENT): ICD-10-CM

## 2019-08-26 RX ORDER — LISINOPRIL 10 MG/1
10 TABLET ORAL DAILY
Qty: 90 TABLET | Refills: 3 | Status: SHIPPED | OUTPATIENT
Start: 2019-08-26 | End: 2019-11-18

## 2019-09-28 ENCOUNTER — HEALTH MAINTENANCE LETTER (OUTPATIENT)
Age: 79
End: 2019-09-28

## 2019-11-05 ENCOUNTER — OFFICE VISIT (OUTPATIENT)
Dept: DERMATOLOGY | Facility: CLINIC | Age: 79
End: 2019-11-05
Payer: MEDICARE

## 2019-11-05 DIAGNOSIS — D22.9 MULTIPLE BENIGN NEVI: ICD-10-CM

## 2019-11-05 DIAGNOSIS — D18.01 CHERRY ANGIOMA: ICD-10-CM

## 2019-11-05 DIAGNOSIS — L57.8 SUN-DAMAGED SKIN: ICD-10-CM

## 2019-11-05 DIAGNOSIS — L81.4 SOLAR LENTIGO: ICD-10-CM

## 2019-11-05 DIAGNOSIS — D36.10 NEUROFIBROMA: ICD-10-CM

## 2019-11-05 DIAGNOSIS — L57.0 ACTINIC KERATOSIS: Primary | ICD-10-CM

## 2019-11-05 DIAGNOSIS — L82.1 SEBORRHEIC KERATOSIS: ICD-10-CM

## 2019-11-05 PROCEDURE — 17003 DESTRUCT PREMALG LES 2-14: CPT | Performed by: DERMATOLOGY

## 2019-11-05 PROCEDURE — 99214 OFFICE O/P EST MOD 30 MIN: CPT | Mod: 25 | Performed by: DERMATOLOGY

## 2019-11-05 PROCEDURE — 17000 DESTRUCT PREMALG LESION: CPT | Performed by: DERMATOLOGY

## 2019-11-05 NOTE — PROGRESS NOTES
"[facility] Dermatology Note    Dermatology Problem List:  1.AKs  -s/p cryotherapy  [place]: [place], [place] --- December - April.     Encounter Date: Nov 5, 2019    CC:  Chief Complaint   Patient presents with     Derm Problem     skin check spot on right cheek came back (prev frozen)      History of Present Illness:  Mr. Schumacher \"Ho\" NELSON Navarro is a 79 year old male who presents for a follow up for a skin check. He was last seen on 5/7/19 when his AKs were treated with cryotherapy. Today he presents with his wife and notes a spot on his right cheek that is rough in texture. This spot was previously treated with cryotherapy, but he is concerned it has come back. Denies any other tender, nonhealing, bleeding skin lesions. Patient allen in [place] and will be leaving soon (in early December). He loves to lay outside in his backyard when he is there. No other concerns addressed today.    Past Medical History:   Patient Active Problem List   Diagnosis     Chronic ischemic heart disease     Gout     HYPERLIPIDEMIA LDL GOAL <100     Advanced directives, counseling/discussion     Mild major depression (H)     S/P AVR (aortic valve replacement)     Transient hyperglycemia post procedure     Anemia due to blood loss, acute     Delirium     Hypertension goal BP (blood pressure) < 140/90     Essential hypertension with goal blood pressure less than 140/90     Major depressive disorder, recurrent episode, mild (H)     Chronic atrial fibrillation     Pain of left sacroiliac joint     Lumbar radiculopathy     Obesity (BMI 35.0-39.9) with comorbidity (H)     Past Medical History:   Diagnosis Date     Anxiety state, unspecified      Atrial fibrillation (H)      Bell's palsy 12/12/1997     Bioprosthetic aortic valve replacement during current hospitalization      Coronary atherosclerosis of unspecified type of vessel, native or graft     coronary artery disease with history of MI and stent placement      Gout, " unspecified      Hydrocephalus 2015     Old myocardial infarction 3/1998    Hx of MI     Osteoarthrosis, unspecified whether generalized or localized, unspecified site     Diffuse migratory arthritis     Other and unspecified hyperlipidemia      Paroxysmal supraventricular tachycardia (H)     Hx of PAT     Pure hypercholesterolemia      Stented coronary artery      Past Surgical History:   Procedure Laterality Date     C EXPLORATORY OF ABDOMEN  1/25/2007    Exploratory laparotomy.  Lysis of adhesions with reduction of internal hernia.     C ROTATOR CUFF STRAP      s/p rotator cuff surgery     COLONOSCOPY N/A 12/5/2016    Procedure: COMBINED COLONOSCOPY, SINGLE OR MULTIPLE BIOPSY/POLYPECTOMY BY BIOPSY;  Surgeon: Benjamin Cavazos MD;  Location: [facility]     HC COLONOSCOPY THRU STOMA, DIAGNOSTIC  8/23/2004     HC KNEE SCOPE,MED/LAT MENISCUS REPAIR       HC REMOVE TONSILS/ADENOIDS,<13 Y/O      unsure of age     Hydrocephalus  2015    shunt placed     INJECT EPIDURAL LUMBAR N/A 6/8/2017    Procedure: INJECT EPIDURAL LUMBAR;  lumbar epidural steroid injection Left Lumbar 5 to sacral 1;  Surgeon: Pawan Davenport MD;  Location: [facility]     REMOVAL OF SPERM DUCT(S)      Vasectomy     REPLACE VALVE AORTIC N/A 9/14/2015    Procedure: REPLACE VALVE AORTIC;  Surgeon: Sang Chan MD;  Location:  OR     Social History:  Patient quit smoking. Spends winters in [place], [place].  with children, grandchildren, and great grandchildren.  Reviewed and left in chart for clinician convenience.     Family History:  Not assessed today.    Medications:  Current Outpatient Medications   Medication Sig Dispense Refill     acetaminophen 650 MG TABS Take 650 mg by mouth every 6 hours 100 tablet      allopurinol (ZYLOPRIM) 100 MG tablet Take 1 tablet (100 mg) by mouth 2 times daily 180 tablet 3     apixaban ANTICOAGULANT (ELIQUIS) 5 MG tablet Take 1 tablet (5 mg) by mouth 2 times daily 180 tablet 3     aspirin EC 81 MG EC  tablet Take 1 tablet (81 mg) by mouth daily       atorvastatin (LIPITOR) 40 MG tablet Take 1 tablet (40 mg) by mouth daily 90 tablet 3     carvedilol (COREG) 12.5 MG tablet Take 1 tablet (12.5 mg) by mouth 2 times daily (with meals) 60 tablet 11     finasteride (PROSCAR) 5 MG tablet Take 1 tablet (5 mg) by mouth daily 90 tablet 3     FLUoxetine (PROZAC) 20 MG capsule Take 3 capsules (60 mg) by mouth daily 270 capsule 3     furosemide (LASIX) 20 MG tablet Take 1 tablet (20 mg) by mouth daily 90 tablet 3     lisinopril (PRINIVIL/ZESTRIL) 10 MG tablet Take 1 tablet (10 mg) by mouth daily 90 tablet 3     nitroglycerin (NITROSTAT) 0.4 MG SL tablet Place 1 tablet (0.4 mg) under the tongue every 5 minutes as needed for chest pain 25 tablet 4     order for DME Equipment being ordered: 4 wheeled walker with brakes and seat 1 Device 0     polyethylene glycol (MIRALAX/GLYCOLAX) packet Take 17 g by mouth daily (Patient taking differently: Take 17 g by mouth as needed ) 7 packet        Allergies   Allergen Reactions     Seroquel [Quetiapine] Other (See Comments)     Felt funny     Review of Systems:  -Constitutional: Patient is otherwise feeling well, in usual state of health.   -Skin: As above in HPI. No additional skin concerns.    Physical exam:  Vitals: There were no vitals taken for this visit.  GEN: This is a well developed, well-nourished male in no acute distress, in a pleasant mood.    SKIN: Total skin excluding the undergarment areas was performed. The exam included the head/face, neck, both arms, chest, back, abdomen, both legs, digits and/or nails and buttocks. Sock/brace on left calf, unable to examine. Patient denied any skin concerns in this area.   - Barnett type II   - Scattered brown macules on sun exposed areas.  - Moderate dermatoheliosis.  - Gritty pink papules on the right preauricular cheek, right cheek, right temple, central forehead, left temple, left cheek, left neck   - There are dome shaped bright  red papules on the trunk.   - Neurofibromas, right upper arm, right chest  - Multiple regular brown pigmented macules and papules are identified on the trunk.   - There are waxy stuck on tan to brown papules on the trunk.  - No other areas of concern on examination.     Impression/Plan:  1. Sun damaged skin with solar lentigines.     Sun precaution was advised including the use of sun screens of SPF 30 or higher, sun protective clothing, and avoidance of tanning beds.    2. Benign findings including: seborrheic keratoses, cherry angioma, neurofibromas    Patient reassured of the benign nature of these lesions.    No further intervention required. Patient to report changes.     3. Multiple benign nevi    No further intervention required. Patient to report changes.     Patient reassured of the benign nature of these lesions.    4. Actinic keratosis. Discussed precancerous nature of these lesions. Recommended treatment to prevent progression to a squamous cell carcinoma. Advised patient to call for follow up if not resolved in 1 month.   Cryotherapy procedure note: After verbal consent and discussion of risks and benefits including but no limited to dyspigmentation/scar, blister, and pain, 7 was(were) treated with 1-2mm freeze border for 2 cycles with liquid nitrogen. Post cryotherapy instructions were provided.     Follow-up in 6 months for skin exam, sooner for lesions of concern.      Staff Involved:  Scribe/Staff    Scribe Disclosure  I, Gill Hernandez, am serving as a scribe to document services personally performed by Dr. Jana Trinidad MD, based on data collection and the provider's statements to me.     Provider Disclosure:   The documentation recorded by the scribe accurately reflects the services I personally performed and the decisions made by me.    Jana Trinidad MD    Department of Dermatology  [facility]: Phone:  [phone], Fax:[phone]  [facility]: Phone: [phone], Fax: [phone]

## 2019-11-05 NOTE — LETTER
"    11/5/2019         RE: Endy Navarro  [address] [address]        Dear Colleague,    Thank you for referring your patient, Endy Navarro, to the [facility]. Please see a copy of my visit note below.    [facility] Dermatology Note    Dermatology Problem List:  1.AKs  -s/p cryotherapy  [place]: [place], [place] --- December - April.     Encounter Date: Nov 5, 2019    CC:  Chief Complaint   Patient presents with     Derm Problem     skin check spot on right cheek came back (prev frozen)      History of Present Illness:  Mr. Schumacher \"Ho\"NELSON Navarro is a 79 year old male who presents for a follow up for a skin check. He was last seen on 5/7/19 when his AKs were treated with cryotherapy. Today he presents with his wife and notes a spot on his right cheek that is rough in texture. This spot was previously treated with cryotherapy, but he is concerned it has come back. Denies any other tender, nonhealing, bleeding skin lesions. Patient [place] in [place] and will be leaving soon (in early December). He loves to lay outside in his backyard when he is there. No other concerns addressed today.    Past Medical History:   Patient Active Problem List   Diagnosis     Chronic ischemic heart disease     Gout     HYPERLIPIDEMIA LDL GOAL <100     Advanced directives, counseling/discussion     Mild major depression (H)     S/P AVR (aortic valve replacement)     Transient hyperglycemia post procedure     Anemia due to blood loss, acute     Delirium     Hypertension goal BP (blood pressure) < 140/90     Essential hypertension with goal blood pressure less than 140/90     Major depressive disorder, recurrent episode, mild (H)     Chronic atrial fibrillation     Pain of left sacroiliac joint     Lumbar radiculopathy     Obesity (BMI 35.0-39.9) with comorbidity (H)     Past Medical History:   Diagnosis Date     Anxiety state, unspecified      Atrial fibrillation (H)  "     Bell's palsy 12/12/1997     Bioprosthetic aortic valve replacement during current hospitalization      Coronary atherosclerosis of unspecified type of vessel, native or graft     coronary artery disease with history of MI and stent placement      Gout, unspecified      Hydrocephalus 2015     Old myocardial infarction 3/1998    Hx of MI     Osteoarthrosis, unspecified whether generalized or localized, unspecified site     Diffuse migratory arthritis     Other and unspecified hyperlipidemia      Paroxysmal supraventricular tachycardia (H)     Hx of PAT     Pure hypercholesterolemia      Stented coronary artery      Past Surgical History:   Procedure Laterality Date     C EXPLORATORY OF ABDOMEN  1/25/2007    Exploratory laparotomy.  Lysis of adhesions with reduction of internal hernia.     C ROTATOR CUFF STRAP      s/p rotator cuff surgery     COLONOSCOPY N/A 12/5/2016    Procedure: COMBINED COLONOSCOPY, SINGLE OR MULTIPLE BIOPSY/POLYPECTOMY BY BIOPSY;  Surgeon: Benjamin Cavazos MD;  Location:  GI     HC COLONOSCOPY THRU STOMA, DIAGNOSTIC  8/23/2004     HC KNEE SCOPE,MED/LAT MENISCUS REPAIR       HC REMOVE TONSILS/ADENOIDS,<11 Y/O      unsure of age     Hydrocephalus  2015    shunt placed     INJECT EPIDURAL LUMBAR N/A 6/8/2017    Procedure: INJECT EPIDURAL LUMBAR;  lumbar epidural steroid injection Left Lumbar 5 to sacral 1;  Surgeon: Pawan Davenport MD;  Location: [facility]     REMOVAL OF SPERM DUCT(S)      Vasectomy     REPLACE VALVE AORTIC N/A 9/14/2015    Procedure: REPLACE VALVE AORTIC;  Surgeon: Sang Chan MD;  Location:  OR     Social History:  Patient quit smoking. Spends winters in [place], [place].  with children, grandchildren, and great grandchildren.  Reviewed and left in chart for clinician convenience.     Family History:  Not assessed today.    Medications:  Current Outpatient Medications   Medication Sig Dispense Refill     acetaminophen 650 MG TABS Take 650 mg by mouth  every 6 hours 100 tablet      allopurinol (ZYLOPRIM) 100 MG tablet Take 1 tablet (100 mg) by mouth 2 times daily 180 tablet 3     apixaban ANTICOAGULANT (ELIQUIS) 5 MG tablet Take 1 tablet (5 mg) by mouth 2 times daily 180 tablet 3     aspirin EC 81 MG EC tablet Take 1 tablet (81 mg) by mouth daily       atorvastatin (LIPITOR) 40 MG tablet Take 1 tablet (40 mg) by mouth daily 90 tablet 3     carvedilol (COREG) 12.5 MG tablet Take 1 tablet (12.5 mg) by mouth 2 times daily (with meals) 60 tablet 11     finasteride (PROSCAR) 5 MG tablet Take 1 tablet (5 mg) by mouth daily 90 tablet 3     FLUoxetine (PROZAC) 20 MG capsule Take 3 capsules (60 mg) by mouth daily 270 capsule 3     furosemide (LASIX) 20 MG tablet Take 1 tablet (20 mg) by mouth daily 90 tablet 3     lisinopril (PRINIVIL/ZESTRIL) 10 MG tablet Take 1 tablet (10 mg) by mouth daily 90 tablet 3     nitroglycerin (NITROSTAT) 0.4 MG SL tablet Place 1 tablet (0.4 mg) under the tongue every 5 minutes as needed for chest pain 25 tablet 4     order for DME Equipment being ordered: 4 wheeled walker with brakes and seat 1 Device 0     polyethylene glycol (MIRALAX/GLYCOLAX) packet Take 17 g by mouth daily (Patient taking differently: Take 17 g by mouth as needed ) 7 packet        Allergies   Allergen Reactions     Seroquel [Quetiapine] Other (See Comments)     Felt funny     Review of Systems:  -Constitutional: Patient is otherwise feeling well, in usual state of health.   -Skin: As above in HPI. No additional skin concerns.    Physical exam:  Vitals: There were no vitals taken for this visit.  GEN: This is a well developed, well-nourished male in no acute distress, in a pleasant mood.    SKIN: Total skin excluding the undergarment areas was performed. The exam included the head/face, neck, both arms, chest, back, abdomen, both legs, digits and/or nails and buttocks. Sock/brace on left calf, unable to examine. Patient denied any skin concerns in this area.   - Barnett  type II   - Scattered brown macules on sun exposed areas.  - Moderate dermatoheliosis.  - Gritty pink papules on the right preauricular cheek, right cheek, right temple, central forehead, left temple, left cheek, left neck   - There are dome shaped bright red papules on the trunk.   - Neurofibromas, right upper arm, right chest  - Multiple regular brown pigmented macules and papules are identified on the trunk.   - There are waxy stuck on tan to brown papules on the trunk.  - No other areas of concern on examination.     Impression/Plan:  1. Sun damaged skin with solar lentigines.     Sun precaution was advised including the use of sun screens of SPF 30 or higher, sun protective clothing, and avoidance of tanning beds.    2. Benign findings including: seborrheic keratoses, cherry angioma, neurofibromas    Patient reassured of the benign nature of these lesions.    No further intervention required. Patient to report changes.     3. Multiple benign nevi    No further intervention required. Patient to report changes.     Patient reassured of the benign nature of these lesions.    4. Actinic keratosis. Discussed precancerous nature of these lesions. Recommended treatment to prevent progression to a squamous cell carcinoma. Advised patient to call for follow up if not resolved in 1 month.   Cryotherapy procedure note: After verbal consent and discussion of risks and benefits including but no limited to dyspigmentation/scar, blister, and pain, 7 was(were) treated with 1-2mm freeze border for 2 cycles with liquid nitrogen. Post cryotherapy instructions were provided.     Follow-up in 6 months for skin exam, sooner for lesions of concern.      Staff Involved:  Scribe/Staff    Scribe Disclosure  I, Gill Hernandez am serving as a scribe to document services personally performed by Dr. Jana Trinidad MD, based on data collection and the provider's statements to me.     Provider Disclosure:   The documentation recorded by  the scribe accurately reflects the services I personally performed and the decisions made by me.    Jana Trinidad MD    Department of Dermatology  [facility]: Phone: [phone], Fax:[phone]  [facility]: Phone: [phone], Fax: [phone]              Again, thank you for allowing me to participate in the care of your patient.        Sincerely,        Jana Trinidad MD

## 2019-11-05 NOTE — NURSING NOTE
Endy Navarro's goals for this visit include:   Chief Complaint   Patient presents with     Derm Problem     skin check spot on right cheek came back (prev frozen)        He requests these members of his care team be copied on today's visit information: yes     PCP: Richi Alvarez    Referring Provider:  Jana Trinidad MD  [address] [place] [id number]    There were no vitals taken for this visit.    Do you need any medication refills at today's visit? No   Amorrae MIGUEL Brown

## 2019-11-05 NOTE — PATIENT INSTRUCTIONS
Cryotherapy    What is it?    Use of a very cold liquid, such as liquid nitrogen, to freeze and destroy abnormal skin cells that need to be removed    What should I expect?    Tenderness and redness    A small blister that might grow and fill with dark purple blood. There may be crusting.    More than one treatment may be needed if the lesions do not go away.    How do I care for the treated area?    Gently wash the area with your hands when bathing.    Use a thin layer of Vaseline to help with healing. You may use a Band-Aid.     The area should heal within 7-10 days and may leave behind a pink or lighter color.     Do not use an antibiotic or Neosporin ointment.     You may take acetaminophen (Tylenol) for pain.     Call your Doctor if you have:    Severe pain    Signs of infection (warmth, redness, cloudy yellow drainage, and or a bad smell)    Questions or concerns    Who should I call with questions?       [facility]: [phone]       [facility]: [phone]       For urgent needs outside of business hours call the [facility] at [phone]        and ask for the dermatology resident on call

## 2019-11-14 DIAGNOSIS — N40.1 BENIGN PROSTATIC HYPERPLASIA WITH LOWER URINARY TRACT SYMPTOMS, SYMPTOM DETAILS UNSPECIFIED: ICD-10-CM

## 2019-11-14 RX ORDER — FINASTERIDE 5 MG/1
TABLET, FILM COATED ORAL
Qty: 30 TABLET | Refills: 0 | Status: SHIPPED | OUTPATIENT
Start: 2019-11-14 | End: 2019-11-18

## 2019-11-14 NOTE — TELEPHONE ENCOUNTER
"Vicki refill given per RN protocol.   Patient has an appt scheduled for: 11/18/19    proscar  Last Written Prescription Date:  11/16/18  Last Fill Quantity: 90,  # refills: 3   Last office visit: 7/1/2019 with prescribing provider:  Antonio   Future Office Visit:   Next 5 appointments (look out 90 days)    Nov 18, 2019  8:30 AM CST  PHYSICAL with [facility]i [facility]varez MD  [facility] [address] [place] [id number] [phone]           Requested Prescriptions   Pending Prescriptions Disp Refills     finasteride (PROSCAR) 5 MG tablet [Pharmacy Med Name: FINASTERIDE 5MG TABS] 90 tablet 3     Sig: TAKE ONE TABLET BY MOUTH ONCE DAILY       Alpha Blockers Passed - 11/14/2019  1:06 AM        Passed - Blood pressure under 140/90 in past 12 months     BP Readings from Last 3 Encounters:   07/26/19 132/86   07/03/19 122/64   07/01/19 132/84                 Passed - Recent (12 mo) or future (30 days) visit within the authorizing provider's specialty     Patient has had an office visit with the authorizing provider or a provider within the authorizing providers department within the previous 12 mos or has a future within next 30 days. See \"Patient Info\" tab in inbasket, or \"Choose Columns\" in Meds & Orders section of the refill encounter.              Passed - Patient does not have Tadalafil, Vardenafil, or Sildenafil on their medication list        Passed - Medication is active on med list        Passed - Patient is 18 years of age or older        Ai Oseguera RN on 11/14/2019 at 9:35 AM    "

## 2019-11-18 ENCOUNTER — OFFICE VISIT (OUTPATIENT)
Dept: INTERNAL MEDICINE | Facility: CLINIC | Age: 79
End: 2019-11-18
Payer: MEDICARE

## 2019-11-18 ENCOUNTER — HOSPITAL ENCOUNTER (OUTPATIENT)
Dept: CT IMAGING | Facility: CLINIC | Age: 79
Discharge: HOME | End: 2019-11-18
Attending: INTERNAL MEDICINE | Admitting: INTERNAL MEDICINE
Payer: MEDICARE

## 2019-11-18 VITALS
WEIGHT: 229 LBS | HEART RATE: 88 BPM | OXYGEN SATURATION: 97 % | TEMPERATURE: 96.8 F | DIASTOLIC BLOOD PRESSURE: 64 MMHG | SYSTOLIC BLOOD PRESSURE: 124 MMHG | RESPIRATION RATE: 16 BRPM | BODY MASS INDEX: 34.82 KG/M2

## 2019-11-18 DIAGNOSIS — I10 HYPERTENSION GOAL BP (BLOOD PRESSURE) < 140/90: ICD-10-CM

## 2019-11-18 DIAGNOSIS — Z98.2 S/P VP SHUNT: ICD-10-CM

## 2019-11-18 DIAGNOSIS — I48.20 CHRONIC ATRIAL FIBRILLATION (H): ICD-10-CM

## 2019-11-18 DIAGNOSIS — M10.9 ACUTE GOUTY ARTHRITIS: ICD-10-CM

## 2019-11-18 DIAGNOSIS — Z95.2 S/P AVR (AORTIC VALVE REPLACEMENT): ICD-10-CM

## 2019-11-18 DIAGNOSIS — Z00.00 ENCOUNTER FOR MEDICARE ANNUAL WELLNESS EXAM: Primary | ICD-10-CM

## 2019-11-18 DIAGNOSIS — Z23 NEED FOR PROPHYLACTIC VACCINATION AND INOCULATION AGAINST INFLUENZA: ICD-10-CM

## 2019-11-18 DIAGNOSIS — E78.5 HYPERLIPIDEMIA LDL GOAL <100: ICD-10-CM

## 2019-11-18 DIAGNOSIS — N40.1 BENIGN PROSTATIC HYPERPLASIA WITH LOWER URINARY TRACT SYMPTOMS, SYMPTOM DETAILS UNSPECIFIED: ICD-10-CM

## 2019-11-18 LAB
ALBUMIN SERPL-MCNC: 3.8 G/DL (ref 3.4–5)
ALP SERPL-CCNC: 79 U/L (ref 40–150)
ALT SERPL W P-5'-P-CCNC: 25 U/L (ref 0–70)
ANION GAP SERPL CALCULATED.3IONS-SCNC: 3 MMOL/L (ref 3–14)
AST SERPL W P-5'-P-CCNC: 17 U/L (ref 0–45)
BILIRUB SERPL-MCNC: 0.5 MG/DL (ref 0.2–1.3)
BUN SERPL-MCNC: 31 MG/DL (ref 7–30)
CALCIUM SERPL-MCNC: 8.7 MG/DL (ref 8.5–10.1)
CHLORIDE SERPL-SCNC: 109 MMOL/L (ref 94–109)
CHOLEST SERPL-MCNC: 132 MG/DL
CO2 SERPL-SCNC: 29 MMOL/L (ref 20–32)
CREAT SERPL-MCNC: 1.34 MG/DL (ref 0.66–1.25)
ERYTHROCYTE [DISTWIDTH] IN BLOOD BY AUTOMATED COUNT: 14 % (ref 10–15)
GFR SERPL CREATININE-BSD FRML MDRD: 50 ML/MIN/{1.73_M2}
GLUCOSE SERPL-MCNC: 90 MG/DL (ref 70–99)
HCT VFR BLD AUTO: 41.3 % (ref 40–53)
HDLC SERPL-MCNC: 54 MG/DL
HGB BLD-MCNC: 12.9 G/DL (ref 13.3–17.7)
LDLC SERPL CALC-MCNC: 54 MG/DL
MCH RBC QN AUTO: 31.8 PG (ref 26.5–33)
MCHC RBC AUTO-ENTMCNC: 31.2 G/DL (ref 31.5–36.5)
MCV RBC AUTO: 102 FL (ref 78–100)
NONHDLC SERPL-MCNC: 78 MG/DL
PLATELET # BLD AUTO: 175 10E9/L (ref 150–450)
POTASSIUM SERPL-SCNC: 4.7 MMOL/L (ref 3.4–5.3)
PROT SERPL-MCNC: 7.4 G/DL (ref 6.8–8.8)
RBC # BLD AUTO: 4.06 10E12/L (ref 4.4–5.9)
SODIUM SERPL-SCNC: 141 MMOL/L (ref 133–144)
TRIGL SERPL-MCNC: 119 MG/DL
WBC # BLD AUTO: 6.8 10E9/L (ref 4–11)

## 2019-11-18 PROCEDURE — 90662 IIV NO PRSV INCREASED AG IM: CPT | Performed by: INTERNAL MEDICINE

## 2019-11-18 PROCEDURE — 80053 COMPREHEN METABOLIC PANEL: CPT | Performed by: INTERNAL MEDICINE

## 2019-11-18 PROCEDURE — G0439 PPPS, SUBSEQ VISIT: HCPCS | Performed by: INTERNAL MEDICINE

## 2019-11-18 PROCEDURE — 85027 COMPLETE CBC AUTOMATED: CPT | Performed by: INTERNAL MEDICINE

## 2019-11-18 PROCEDURE — G0008 ADMIN INFLUENZA VIRUS VAC: HCPCS | Performed by: INTERNAL MEDICINE

## 2019-11-18 PROCEDURE — 36415 COLL VENOUS BLD VENIPUNCTURE: CPT | Performed by: INTERNAL MEDICINE

## 2019-11-18 PROCEDURE — 70450 CT HEAD/BRAIN W/O DYE: CPT

## 2019-11-18 PROCEDURE — 80061 LIPID PANEL: CPT | Performed by: INTERNAL MEDICINE

## 2019-11-18 RX ORDER — FUROSEMIDE 20 MG
20 TABLET ORAL DAILY
Qty: 90 TABLET | Refills: 3 | Status: SHIPPED | OUTPATIENT
Start: 2019-11-18 | End: 2020-11-02

## 2019-11-18 RX ORDER — ATORVASTATIN CALCIUM 40 MG/1
40 TABLET, FILM COATED ORAL DAILY
Qty: 90 TABLET | Refills: 3 | Status: SHIPPED | OUTPATIENT
Start: 2019-11-18 | End: 2020-11-02

## 2019-11-18 RX ORDER — LOSARTAN POTASSIUM 50 MG/1
50 TABLET ORAL DAILY
Qty: 90 TABLET | Refills: 3 | Status: SHIPPED | OUTPATIENT
Start: 2019-11-18 | End: 2020-10-29

## 2019-11-18 RX ORDER — FINASTERIDE 5 MG/1
1 TABLET, FILM COATED ORAL DAILY
Qty: 90 TABLET | Refills: 3 | Status: SHIPPED | OUTPATIENT
Start: 2019-11-18 | End: 2019-12-19

## 2019-11-18 RX ORDER — CARVEDILOL 12.5 MG/1
12.5 TABLET ORAL 2 TIMES DAILY WITH MEALS
Qty: 180 TABLET | Refills: 3 | Status: SHIPPED | OUTPATIENT
Start: 2019-11-18 | End: 2020-11-02

## 2019-11-18 RX ORDER — ALLOPURINOL 100 MG/1
100 TABLET ORAL 2 TIMES DAILY
Qty: 180 TABLET | Refills: 3 | Status: SHIPPED | OUTPATIENT
Start: 2019-11-18 | End: 2020-11-02

## 2019-11-18 ASSESSMENT — ACTIVITIES OF DAILY LIVING (ADL): CURRENT_FUNCTION: NO ASSISTANCE NEEDED

## 2019-11-18 ASSESSMENT — ENCOUNTER SYMPTOMS
SHORTNESS OF BREATH: 1
HEADACHES: 1
FREQUENCY: 0
DIZZINESS: 0
PARESTHESIAS: 0
HEARTBURN: 0
PALPITATIONS: 0
NAUSEA: 0
MYALGIAS: 1
HEMATURIA: 0
FEVER: 0
SORE THROAT: 0
ARTHRALGIAS: 1
CONSTIPATION: 0
ABDOMINAL PAIN: 0
DIARRHEA: 0
DYSURIA: 0
CHILLS: 0
JOINT SWELLING: 0
COUGH: 1
WEAKNESS: 1
HEMATOCHEZIA: 0
EYE PAIN: 0
NERVOUS/ANXIOUS: 1

## 2019-11-18 ASSESSMENT — PAIN SCALES - GENERAL: PAINLEVEL: NO PAIN (0)

## 2019-11-18 NOTE — PROGRESS NOTES
"SUBJECTIVE:   Endy Navarro is a 79 year old male who presents for Preventive Visit.    Are you in the first 12 months of your Medicare coverage?  No    Healthy Habits:     In general, how would you rate your overall health?  Fair    Frequency of exercise:  None    Do you usually eat at least 4 servings of fruit and vegetables a day, include whole grains    & fiber and avoid regularly eating high fat or \"junk\" foods?  Yes    Taking medications regularly:  Yes    Medication side effects:  Not applicable    Ability to successfully perform activities of daily living:  No assistance needed    Home Safety:  No safety concerns identified    Hearing Impairment:  Difficulty following a conversation in a noisy restaurant or crowded room, feel that people are mumbling or not speaking clearly, need to ask people to speak up or repeat themselves and difficulty understanding soft or whispered speech    In the past 6 months, have you been bothered by leaking of urine?  No    In general, how would you rate your overall mental or emotional health?  Fair      PHQ-2 Total Score: 2    Additional concerns today:  No    Cough more now, seems to be since being on Lisinopril.  Dry cough.  Started lisinopril in June.    Some falls still, starts to tip and can't get balance. Not low bp.      BP at home has been good, pulse is around 100 he says.    Sleeps a lot in the chair.        Do you feel safe in your environment? Yes    Have you ever done Advance Care Planning? (For example, a Health Directive, POLST, or a discussion with a medical provider or your loved ones about your wishes): Yes, patient states has an Advance Care Planning document and will bring a copy to the clinic.      Fall risk  Fallen 2 or more times in the past year?: Screen not completed for medical reason(s)  Any fall with injury in the past year?: Screen not completed for medical reason(s)    Cognitive Screening   1) Repeat 3 items (Leader, Season, Table)    2) Clock " Verified Results  CULTURE STREP GRP A [id number] 12:01AM ODOM, APRIL   [Nov 13, 2017 11:01AM ODOM, APRIL]  Negative for strep     Test Name Result Flag Reference   CULT STTH  O    SPECIMEN DESCRIPTION (SDES): THROAT  CULTURE (CULT):        NO BETA HEMOLYTIC STREPTOCOCCI ISOLATED  REPORT STATUS (RPT):     FINAL 11/11/2017        draw: NORMAL  3) 3 item recall: Recalls NO objects   Results: 0 items recalled: PROBABLE COGNITIVE IMPAIRMENT, **INFORM PROVIDER**    Mini-CogTM Copyright ANTIONETTE Albright. Licensed by the author for use in [facility]; reprinted with permission (benjamin@[facility]). All rights reserved.      Do you have sleep apnea, excessive snoring or daytime drowsiness?: no    Reviewed and updated as needed this visit by clinical staff  Allergies  Meds         Reviewed and updated as needed this visit by Provider        Social History     Tobacco Use     Smoking status: Former Smoker     Smokeless tobacco: Never Used     Tobacco comment: quit 25 yr ago   Substance Use Topics     Alcohol use: Yes     Alcohol/week: 3.0 - 4.0 standard drinks     Types: 3 - 4 Standard drinks or equivalent per week     Comment: couple times a week       Alcohol Use 11/18/2019   Prescreen: >3 drinks/day or >7 drinks/week? No   Prescreen: >3 drinks/day or >7 drinks/week? -     Current providers sharing in care for this patient include:   Patient Care Team:  Richi Alvarez MD as PCP - General (Internal Medicine)  Richi Alvarez MD as Assigned PCP    The following health maintenance items are reviewed in Epic and correct as of today:  Health Maintenance   Topic Date Due     OP ANNUAL INR REFERRAL  08/19/2015     ZOSTER IMMUNIZATION (2 of 3) 11/15/2016     INFLUENZA VACCINE (1) 09/01/2019     LIPID  11/16/2019     FALL RISK ASSESSMENT  11/16/2019     MEDICARE ANNUAL WELLNESS VISIT  11/16/2019     PHQ-9  01/01/2020     ADVANCE CARE PLANNING  06/06/2022     DTAP/TDAP/TD IMMUNIZATION (4 - Td) 11/16/2028     DEPRESSION ACTION PLAN  Completed     PNEUMOCOCCAL IMMUNIZATION 65+ LOW/MEDIUM RISK  Completed     IPV IMMUNIZATION  Aged Out     MENINGITIS IMMUNIZATION  Aged Out     Lab work is in process  Pneumonia Vaccine:already done.    Review of Systems   Constitutional: Negative for chills and fever.   HENT: Positive for congestion and hearing loss. Negative  "for ear pain and sore throat.    Eyes: Negative for pain and visual disturbance.   Respiratory: Positive for cough and shortness of breath.    Cardiovascular: Negative for chest pain, palpitations and peripheral edema.   Gastrointestinal: Negative for abdominal pain, constipation, diarrhea, heartburn, hematochezia and nausea.   Genitourinary: Positive for impotence. Negative for discharge, dysuria, frequency, genital sores, hematuria and urgency.   Musculoskeletal: Positive for arthralgias and myalgias. Negative for joint swelling.   Skin: Negative for rash.   Neurological: Positive for weakness and headaches. Negative for dizziness and paresthesias.   Psychiatric/Behavioral: Positive for mood changes. The patient is nervous/anxious.        OBJECTIVE:   /64   Pulse 88   Temp 96.8  F (36  C) (Temporal)   Resp 16   Wt 103.9 kg (229 lb)   SpO2 97%   BMI 34.82 kg/m   Estimated body mass index is 36.02 kg/m  as calculated from the following:    Height as of 7/26/19: 1.727 m (5' 8\").    Weight as of 7/26/19: 107.5 kg (236 lb 14.4 oz).  Physical Exam  GENERAL: healthy, alert and no distress  EYES: Eyes grossly normal to inspection, PERRL and conjunctivae and sclerae normal  HENT: ear canals and TM's normal, nose and mouth without ulcers or lesions  NECK: no adenopathy, no asymmetry, masses, or scars and thyroid normal to palpation  RESP: lungs clear to auscultation - no rales, rhonchi or wheezes  CV: irregular rhythm, normal S1 S2, no S3 or S4, no murmur, click or rub, no peripheral edema and peripheral pulses strong  ABDOMEN: soft, nontender, no hepatosplenomegaly, no masses and bowel sounds normal  MS: no gross musculoskeletal defects noted, no edema  SKIN: no suspicious lesions or rashes  NEURO: Normal strength and tone, mentation intact and speech normal  NEURO: balance is poor but able to get up on the table.   PSYCH: mentation appears normal, affect normal/bright    ASSESSMENT / PLAN:       ICD-10-CM  "   1. Encounter for Medicare annual wellness exam Z00.00    2. S/P AVR (aortic valve replacement) Z95.2    3. Hypertension goal BP (blood pressure) < 140/90 I10 losartan (COZAAR) 50 MG tablet     carvedilol (COREG) 12.5 MG tablet   4. Hyperlipidemia LDL goal <100 E78.5 atorvastatin (LIPITOR) 40 MG tablet     Comprehensive metabolic panel     CBC with platelets     Lipid Profile   5. Chronic atrial fibrillation I48.20 CBC with platelets   6. Acute gouty arthritis M10.9 allopurinol (ZYLOPRIM) 100 MG tablet   7. S/P AVR Z95.2 apixaban ANTICOAGULANT (ELIQUIS) 5 MG tablet     FLUoxetine (PROZAC) 20 MG capsule   8. Benign prostatic hyperplasia with lower urinary tract symptoms, symptom details unspecified N40.1 finasteride (PROSCAR) 5 MG tablet   9. Essential hypertension with goal blood pressure less than 140/90 I10 furosemide (LASIX) 20 MG tablet     Comprehensive metabolic panel     CBC with platelets     Lipid Profile       79-year-old patient who has a history of a prosthetic aortic valve.  He also has atrial fibrillation he is anticoagulated with Eliquis.  He saw cardiology this summer and they switched his diltiazem to Coreg and over to lisinopril.  Unfortunately he has had a chronic cough since the lisinopril.  We will stop lisinopril 10 and put him on losartan 50 hopefully get rid of the cough.  If he is in an irregular rhythm today but is rate controlled.    He is got a chronic history of some balance issues with falling that he has had a  shunt in the past he did see neurosurgery but they never did the CT scan that was recommended we will get that CT scan scheduled for him this week prior to going to [place].  His memory is chronically been off and will not work this up any further at this time he does well with his wife setting up his pills and providing most of his care.    We will check his labs refill all of his medications for BPH, heart, cholesterol and mood which is been stable on Prozac.  Follow-up in  "the spring.  COUNSELING:  Reviewed preventive health counseling, as reflected in patient instructions       Regular exercise       Healthy diet/nutrition       Immunizations    Vaccinated for: Influenza          Estimated body mass index is 36.02 kg/m  as calculated from the following:    Height as of 7/26/19: 1.727 m (5' 8\").    Weight as of 7/26/19: 107.5 kg (236 lb 14.4 oz).    Weight management plan: Discussed healthy diet and exercise guidelines     reports that he has quit smoking. He has never used smokeless tobacco.      Appropriate preventive services were discussed with this patient, including applicable screening as appropriate for cardiovascular disease, diabetes, osteopenia/osteoporosis, and glaucoma.  As appropriate for age/gender, discussed screening for colorectal cancer, prostate cancer, breast cancer, and cervical cancer. Checklist reviewing preventive services available has been given to the patient.    Reviewed patients plan of care and provided an AVS. The Basic Care Plan (routine screening as documented in Health Maintenance) for Endy meets the Care Plan requirement. This Care Plan has been established and reviewed with the Patient and spouse.    Counseling Resources:  ATP IV Guidelines  Pooled Cohorts Equation Calculator  Breast Cancer Risk Calculator  FRAX Risk Assessment  ICSI Preventive Guidelines  Dietary Guidelines for Americans, 2010  USDA's MyPlate  ASA Prophylaxis  Lung CA Screening    Richi Alvarez MD  [facility]    Identified Health Risks:    The patient was provided with suggestions to help him develop a healthy physical lifestyle.  He is at risk for lack of exercise and has been provided with information to increase physical activity for the benefit of his well-being.  The patient was provided with written information regarding signs of hearing loss.  The patient was provided with suggestions to help him develop a healthy emotional lifestyle.  "

## 2019-11-18 NOTE — PATIENT INSTRUCTIONS
Patient Education   Personalized Prevention Plan  You are due for the preventive services outlined below.  Your care team is available to assist you in scheduling these services.  If you have already completed any of these items, please share that information with your care team to update in your medical record.  Health Maintenance Due   Topic Date Due     INR CLINIC REFERRAL - yearly  08/19/2015     Zoster (Shingles) Vaccine (2 of 3) 11/15/2016     Flu Vaccine (1) 09/01/2019     Cholesterol Lab  11/16/2019     FALL RISK ASSESSMENT  11/16/2019     Annual Wellness Visit  11/16/2019     Your Health Risk Assessment indicates you feel you are not in good health    A healthy lifestyle helps keep the body fit and the mind alert. It helps protect you from disease, helps you fight disease, and helps prevent chronic disease (disease that doesn't go away) from getting worse. This is important as you get older and begin to notice twinges in muscles and joints and a decline in the strength and stamina you once took for granted. A healthy lifestyle includes good healthcare, good nutrition, weight control, recreation, and regular exercise. Avoid harmful substances and do what you can to keep safe. Another part of a healthy lifestyle is stay mentally active and socially involved.    Good healthcare     Have a wellness visit every year.     If you have new symptoms, let us know right away. Don't wait until the next checkup.     Take medicines exactly as prescribed and keep your medicines in a safe place. Tell us if your medicine causes problems.   Healthy diet and weight control     Eat 3 or 4 small, nutritious, low-fat, high-fiber meals a day. Include a variety of fruits, vegetables, and whole-grain foods.     Make sure you get enough calcium in your diet. Calcium, vitamin D, and exercise help prevent osteoporosis (bone thinning).     If you live alone, try eating with others when you can. That way you get a good meal and have  company while you eat it.     Try to keep a healthy weight. If you eat more calories than your body uses for energy, it will be stored as fat and you will gain weight.     Recreation   Recreation is not limited to sports and team events. It includes any activity that provides relaxation, interest, enjoyment, and exercise. Recreation provides an outlet for physical, mental, and social energy. It can give a sense of worth and achievement. It can help you stay healthy.    Mental Exercise and Social Involvement  Mental and emotional health is as important as physical health. Keep in touch with friends and family. Stay as active as possible. Continue to learn and challenge yourself.   Things you can do to stay mentally active are:    Learn something new, like a foreign language or musical instrument.     Play SCRABBLE or do crossword puzzles. If you cannot find people to play these games with you at home, you can play them with others on your computer through the Internet.     Join a games club--anything from card games to chess or checkers or lawn bowling.     Start a new hobby.     Go back to school.     Volunteer.     Read.   Keep up with world events.    Exercise for a Healthier Heart     Exercise with a friend. When activity is fun, you're more likely to stick with it.   You may wonder how you can improve the health of your heart. If you re thinking about exercise, you re on the right track. You don t need to become an athlete, but you do need a certain amount of brisk exercise to help strengthen your heart. If you have been diagnosed with a heart condition, your doctor may recommend exercise to help stabilize your condition. To help make exercise a habit, choose safe, fun activities.  Be sure to check with your healthcare provider before starting an exercise program.   Why exercise?  Exercising regularly offers many healthy rewards. It can help you do all of the following:    Improve your blood cholesterol level to  help prevent further heart trouble    Lower your blood pressure to help prevent a stroke or heart attack    Control diabetes, or reduce your risk of getting this disease    Improve your heart and lung function    Reach and maintain a healthy weight    Make your muscles stronger and more limber so you can stay active    Prevent falls and fractures by slowing the loss of bone mass (osteoporosis)    Manage stress better    Reduce your blood pressure    Improve your sense of self and your body image  Exercise tips  Ease into your routine. Set small goals. Then build on them.  Exercise on most days. Aim for a total of 150 or more minutes of moderate to  vigorous intensity activity each week. Consider 40 minutes, 3 to 4 times a week. For best results, activity should last for 40 minutes on average. It is OK to work up to the 40 minute period over time. Examples of moderate-intensity activity is walking 1 mile in 15 minutes or 30 to 45 minutes of yard work.  Step up your daily activity level. Along with your exercise program, try being more active throughout the day. Walk instead of drive. Do more household tasks or yard work.  Choose one or more activities you enjoy. Walking is one of the easiest things you can do. You can also try swimming, riding a bike, dancing, or taking an exercise class.  Stop exercising and call your doctor if you:    Have chest pain or feel dizzy or lightheaded    Feel burning, tightness, pressure, or heaviness in your chest, neck, shoulders, back, or arms    Have unusual shortness of breath    Have increased joint or muscle pain    Have palpitations or an irregular heartbeat   Date Last Reviewed: 5/1/2016 2000-2018 [employer]. [address] [place] [address]. All rights reserved. This information is not intended as a substitute for professional medical care. Always follow your healthcare professional's instructions.          Signs of Hearing Loss     Hearing much  better with one ear can be a sign of hearing loss.     Hearing loss is a problem shared by many people. In fact, it is one of the most common health conditions, particularly as people age. Most people over age 65 have some hearing loss, and by age 80, almost everyone does. Because hearing loss usually occurs slowly over the years, you may not realize your hearing ability has gotten worse.  Have your hearing checked  Contact your healthcare provider if you:    Have to strain to hear normal conversation    Have to watch other people s faces very carefully to follow what they re saying    Need to ask people to repeat what they ve said    Often misunderstand what people are saying    Turn the volume of the television or radio up so high that others complain    Feel that people are mumbling when they re talking to you    Find that the effort to hear leaves you feeling tired and irritated    Notice, when using the phone, that you hear better with one ear than the other  Date Last Reviewed: 12/1/2016 2000-2018 [employer]. [address] [place] [address]. All rights reserved. This information is not intended as a substitute for professional medical care. Always follow your healthcare professional's instructions.        Your Health Risk Assessment indicates you feel you are not in good emotional health.    Recreation   Recreation is not limited to sports and team events. It includes any activity that provides relaxation, interest, enjoyment, and exercise. Recreation provides an outlet for physical, mental, and social energy. It can give a sense of worth and achievement. It can help you stay healthy.    Mental Exercise and Social Involvement  Mental and emotional health is as important as physical health. Keep in touch with friends and family. Stay as active as possible. Continue to learn and challenge yourself.   Things you can do to stay mentally active are:    Learn something new, like a foreign  language or musical instrument.     Play SCRABBLE or do crossword puzzles. If you cannot find people to play these games with you at home, you can play them with others on your computer through the Internet.     Join a games club--anything from card games to chess or checkers or lawn bowling.     Start a new hobby.     Go back to school.     Volunteer.     Read.   Keep up with world events.

## 2019-11-18 NOTE — PROGRESS NOTES
Appropriate assistive devices provided during their visit. Yes (Yes, No, N/A) Cane/tech (list device)    Exam table and/or cart  placed in the lowest position. yes (Yes, No, N/A)    Brakes on tables/carts/wheelchairs used at all times. yes (Yes, No, N/A)    Non slip footwear applied. yes (Yes, No, NA)    Patient was accompanied by staff throughout visit. yes (Yes, No, N/A)    Equipment safety straps used. N/a (Yes, No, N/A)    Assist with toileting. N/a (Yes, No, N/A)     Nida Haines  11/18/2019  9:27 AM

## 2019-12-16 DIAGNOSIS — N40.1 BENIGN PROSTATIC HYPERPLASIA WITH LOWER URINARY TRACT SYMPTOMS, SYMPTOM DETAILS UNSPECIFIED: ICD-10-CM

## 2019-12-16 RX ORDER — FINASTERIDE 5 MG/1
TABLET, FILM COATED ORAL
Qty: 30 TABLET | Refills: 0 | OUTPATIENT
Start: 2019-12-16

## 2019-12-16 NOTE — TELEPHONE ENCOUNTER
finasteride (PROSCAR) 5 MG tablet 90 tablet 3 11/18/2019  No   Sig - Route: Take 1 tablet (5 mg) by mouth daily - Oral   Sent to pharmacy as: finasteride (PROSCAR) 5 MG tablet   Class: E-Prescribe   Order: [id number]   E-Prescribing Status: Receipt confirmed by pharmacy (11/18/2019  8:57 AM CST)     This RX has already been refilled. Please refuse.

## 2019-12-17 DIAGNOSIS — N40.1 BENIGN PROSTATIC HYPERPLASIA WITH LOWER URINARY TRACT SYMPTOMS, SYMPTOM DETAILS UNSPECIFIED: ICD-10-CM

## 2019-12-17 RX ORDER — FINASTERIDE 5 MG/1
TABLET, FILM COATED ORAL
Qty: 30 TABLET | Refills: 0 | OUTPATIENT
Start: 2019-12-17

## 2019-12-17 NOTE — TELEPHONE ENCOUNTER
Prescription was sent 11/18/19 for #90 with 3 refills.  Pharmacy notified via E-Prescribe refusal.     Angélica Walker RN on 12/17/2019 at 9:50 AM

## 2019-12-17 NOTE — TELEPHONE ENCOUNTER
Pt. Calling in regard to message below:   Pt. States is taking Gabapentin 400mg TID and this dose is not helping.   Pt. States all symptoms are still present and this dose does not help with symptoms at all.   Pt. Is recommending adjustment.     To provider please advise.    Requested Prescriptions   Pending Prescriptions Disp Refills     finasteride (PROSCAR) 5 MG tablet [Pharmacy Med Name: FINASTERIDE 5MG TABS] 30 tablet 0     Sig: TAKE ONE TABLET BY MOUTH ONCE DAILY - NEED OFFICE VISIT FOR FURTHER REFILLS       There is no refill protocol information for this order      Last Written Prescription Date:  11/18/19  Last Fill Quantity: 90,  # refills: 3   Last office visit: 11/18/2019 with prescribing provider:  Antonio   Future Office Visit:

## 2019-12-17 NOTE — TELEPHONE ENCOUNTER
furosemide (LASIX) 20 MG tablet 90 tablet 3 11/18/2019  No   Sig - Route: Take 1 tablet (20 mg) by mouth daily - Oral   Sent to pharmacy as: furosemide (LASIX) 20 MG tablet   Class: E-Prescribe   Order: [id number]   E-Prescribing Status: Receipt confirmed by pharmacy (11/18/2019  8:57 AM CST)     Please refuse this RX has been approved for the year on 11/18/19

## 2019-12-18 DIAGNOSIS — N40.1 BENIGN PROSTATIC HYPERPLASIA WITH LOWER URINARY TRACT SYMPTOMS, SYMPTOM DETAILS UNSPECIFIED: ICD-10-CM

## 2019-12-18 RX ORDER — FINASTERIDE 5 MG/1
TABLET, FILM COATED ORAL
Qty: 30 TABLET | Refills: 0 | OUTPATIENT
Start: 2019-12-18

## 2019-12-18 NOTE — TELEPHONE ENCOUNTER
Requested Prescriptions   Pending Prescriptions Disp Refills     finasteride (PROSCAR) 5 MG tablet [Pharmacy Med Name: FINASTERIDE 5MG TABS] 30 tablet 0     Sig: TAKE ONE TABLET BY MOUTH ONCE DAILY -        There is no refill protocol information for this order      Last Written Prescription Date:  11/18/2019  Last Fill Quantity: 90,  # refills: 3   Last office visit: 11/18/2019 with prescribing provider:     Future Office Visit:      Denied  Refills current  Syara Gore RN

## 2019-12-19 DIAGNOSIS — N40.1 BENIGN PROSTATIC HYPERPLASIA WITH LOWER URINARY TRACT SYMPTOMS, SYMPTOM DETAILS UNSPECIFIED: ICD-10-CM

## 2019-12-19 RX ORDER — FINASTERIDE 5 MG/1
TABLET, FILM COATED ORAL
Qty: 30 TABLET | Refills: 0 | Status: SHIPPED | OUTPATIENT
Start: 2019-12-19 | End: 2020-11-02

## 2019-12-19 NOTE — TELEPHONE ENCOUNTER
Finasteride 5 MG       Last Written Prescription Date:  11/18/19  Last Fill Quantity: 90,   # refills: 3  Last Office Visit: 11/18/18  Future Office visit:       Routing refill request to provider for review/approval because:  Drug not on the G, P or [facility] refill protocol or controlled substance  Pharmacy is stating they NEVER received last refill.

## 2020-06-23 ENCOUNTER — TELEPHONE (OUTPATIENT)
Dept: INTERNAL MEDICINE | Facility: CLINIC | Age: 80
End: 2020-06-23

## 2020-06-23 DIAGNOSIS — H90.8 MIXED CONDUCTIVE AND SENSORINEURAL HEARING LOSS, UNSPECIFIED LATERALITY: Primary | ICD-10-CM

## 2020-06-23 DIAGNOSIS — Z01.10 HEARING EXAMINATION: Primary | ICD-10-CM

## 2020-06-23 NOTE — TELEPHONE ENCOUNTER
Reason for Call: Request for an order or referral:    Order or referral being requested: for the hearing center, please mail to the office [address], [address],[place] [address]    Date needed: as soon as possible    Has the patient been seen by the PCP for this problem? YES    Additional comments: Florence calling from the hearing center in [place], patient needs to get this order for the test they are doing today. Florence is aware that provider is out of the office today and is ok to wait until tomorrow for this.     Phone number Patient can be reached at:  Other phone number:  [phone]    Best Time:  any    Can we leave a detailed message on this number?  Not Applicable    Call taken on 6/23/2020 at 1:40 PM by Savita Jones

## 2020-06-23 NOTE — TELEPHONE ENCOUNTER
Reason for Call: Request for an order or referral:    Order or referral being requested: Referral     Date needed: as soon as possible    Has the patient been seen by the PCP for this problem? Not Applicable    Additional comments: Florence from [facility] calling and states she needs a referral so patient can get a hearing test done. Please advise. Please send via mail at [address] [facility] [address] [facility] [address]    Phone number Patient can be reached at:      Best Time:     Can we leave a detailed message on this number?  Not Applicable    Call taken on 6/23/2020 at 1:13 PM by Agustina Smith   negative...

## 2020-11-02 ENCOUNTER — OFFICE VISIT (OUTPATIENT)
Dept: INTERNAL MEDICINE | Facility: CLINIC | Age: 80
End: 2020-11-02
Payer: MEDICARE

## 2020-11-02 VITALS
OXYGEN SATURATION: 96 % | SYSTOLIC BLOOD PRESSURE: 136 MMHG | BODY MASS INDEX: 34.82 KG/M2 | TEMPERATURE: 96.2 F | WEIGHT: 229 LBS | HEART RATE: 104 BPM | DIASTOLIC BLOOD PRESSURE: 74 MMHG | RESPIRATION RATE: 16 BRPM

## 2020-11-02 DIAGNOSIS — Z95.2 S/P AVR (AORTIC VALVE REPLACEMENT): Primary | ICD-10-CM

## 2020-11-02 DIAGNOSIS — N40.1 BENIGN PROSTATIC HYPERPLASIA WITH LOWER URINARY TRACT SYMPTOMS, SYMPTOM DETAILS UNSPECIFIED: ICD-10-CM

## 2020-11-02 DIAGNOSIS — Z23 NEED FOR PROPHYLACTIC VACCINATION AND INOCULATION AGAINST INFLUENZA: ICD-10-CM

## 2020-11-02 DIAGNOSIS — F32.0 MILD MAJOR DEPRESSION (H): ICD-10-CM

## 2020-11-02 DIAGNOSIS — M10.9 ACUTE GOUTY ARTHRITIS: ICD-10-CM

## 2020-11-02 DIAGNOSIS — E78.5 HYPERLIPIDEMIA LDL GOAL <100: ICD-10-CM

## 2020-11-02 DIAGNOSIS — I10 HYPERTENSION GOAL BP (BLOOD PRESSURE) < 140/90: ICD-10-CM

## 2020-11-02 LAB
ALBUMIN SERPL-MCNC: 3.6 G/DL (ref 3.4–5)
ALP SERPL-CCNC: 83 U/L (ref 40–150)
ALT SERPL W P-5'-P-CCNC: 32 U/L (ref 0–70)
ANION GAP SERPL CALCULATED.3IONS-SCNC: 7 MMOL/L (ref 3–14)
AST SERPL W P-5'-P-CCNC: 21 U/L (ref 0–45)
BILIRUB SERPL-MCNC: 0.3 MG/DL (ref 0.2–1.3)
BUN SERPL-MCNC: 38 MG/DL (ref 7–30)
CALCIUM SERPL-MCNC: 9.2 MG/DL (ref 8.5–10.1)
CHLORIDE SERPL-SCNC: 111 MMOL/L (ref 94–109)
CO2 SERPL-SCNC: 25 MMOL/L (ref 20–32)
CREAT SERPL-MCNC: 1.6 MG/DL (ref 0.66–1.25)
CREAT UR-MCNC: 77 MG/DL
ERYTHROCYTE [DISTWIDTH] IN BLOOD BY AUTOMATED COUNT: 13.5 % (ref 10–15)
GFR SERPL CREATININE-BSD FRML MDRD: 40 ML/MIN/{1.73_M2}
GLUCOSE SERPL-MCNC: 124 MG/DL (ref 70–99)
HCT VFR BLD AUTO: 43.6 % (ref 40–53)
HGB BLD-MCNC: 14.1 G/DL (ref 13.3–17.7)
MCH RBC QN AUTO: 31.8 PG (ref 26.5–33)
MCHC RBC AUTO-ENTMCNC: 32.3 G/DL (ref 31.5–36.5)
MCV RBC AUTO: 98 FL (ref 78–100)
MICROALBUMIN UR-MCNC: >340 MG/L
MICROALBUMIN/CREAT UR: 439.84 MG/G CR (ref 0–17)
PLATELET # BLD AUTO: 138 10E9/L (ref 150–450)
POTASSIUM SERPL-SCNC: 4.3 MMOL/L (ref 3.4–5.3)
PROT SERPL-MCNC: 7.1 G/DL (ref 6.8–8.8)
RBC # BLD AUTO: 4.43 10E12/L (ref 4.4–5.9)
SODIUM SERPL-SCNC: 143 MMOL/L (ref 133–144)
WBC # BLD AUTO: 6.7 10E9/L (ref 4–11)

## 2020-11-02 PROCEDURE — 36415 COLL VENOUS BLD VENIPUNCTURE: CPT | Performed by: INTERNAL MEDICINE

## 2020-11-02 PROCEDURE — 85027 COMPLETE CBC AUTOMATED: CPT | Performed by: INTERNAL MEDICINE

## 2020-11-02 PROCEDURE — 99214 OFFICE O/P EST MOD 30 MIN: CPT | Mod: 25 | Performed by: INTERNAL MEDICINE

## 2020-11-02 PROCEDURE — G0008 ADMIN INFLUENZA VIRUS VAC: HCPCS | Performed by: INTERNAL MEDICINE

## 2020-11-02 PROCEDURE — 82043 UR ALBUMIN QUANTITATIVE: CPT | Performed by: INTERNAL MEDICINE

## 2020-11-02 PROCEDURE — 80053 COMPREHEN METABOLIC PANEL: CPT | Performed by: INTERNAL MEDICINE

## 2020-11-02 PROCEDURE — 90662 IIV NO PRSV INCREASED AG IM: CPT | Performed by: INTERNAL MEDICINE

## 2020-11-02 RX ORDER — LOSARTAN POTASSIUM 50 MG/1
50 TABLET ORAL DAILY
Qty: 90 TABLET | Refills: 3 | Status: SHIPPED | OUTPATIENT
Start: 2020-11-02 | End: 2020-12-02

## 2020-11-02 RX ORDER — ALLOPURINOL 100 MG/1
100 TABLET ORAL 2 TIMES DAILY
Qty: 180 TABLET | Refills: 3 | Status: SHIPPED | OUTPATIENT
Start: 2020-11-02 | End: 2021-01-01

## 2020-11-02 RX ORDER — FUROSEMIDE 20 MG
20 TABLET ORAL DAILY
Qty: 90 TABLET | Refills: 3 | Status: SHIPPED | OUTPATIENT
Start: 2020-11-02 | End: 2021-01-01

## 2020-11-02 RX ORDER — FINASTERIDE 5 MG/1
TABLET, FILM COATED ORAL
Qty: 90 TABLET | Refills: 3 | Status: SHIPPED | OUTPATIENT
Start: 2020-11-02 | End: 2021-01-01

## 2020-11-02 RX ORDER — ATORVASTATIN CALCIUM 40 MG/1
40 TABLET, FILM COATED ORAL DAILY
Qty: 90 TABLET | Refills: 3 | Status: SHIPPED | OUTPATIENT
Start: 2020-11-02 | End: 2021-01-01

## 2020-11-02 RX ORDER — CARVEDILOL 12.5 MG/1
12.5 TABLET ORAL 2 TIMES DAILY WITH MEALS
Qty: 180 TABLET | Refills: 3 | Status: SHIPPED | OUTPATIENT
Start: 2020-11-02 | End: 2021-01-01

## 2020-11-02 ASSESSMENT — PATIENT HEALTH QUESTIONNAIRE - PHQ9: SUM OF ALL RESPONSES TO PHQ QUESTIONS 1-9: 12

## 2020-11-02 NOTE — PROGRESS NOTES
Subjective     Endy Navarro is a 80 year old male who presents to clinic today for the following health issues:    HPI         Chief Complaint   Patient presents with     Recheck Medication     med check before leaving [facility] for the winter     Doing ok, Going to [place] for the winter.  Will be driving down there.      Medications are ok, needs refills.      Lots of pain in his joints. Back injections used to work the first two times.     Past Medical History:   Diagnosis Date     Anxiety state, unspecified      Atrial fibrillation (H)      Bell's palsy 12/12/1997     Bioprosthetic aortic valve replacement during current hospitalization      Coronary atherosclerosis of unspecified type of vessel, native or graft     coronary artery disease with history of MI and stent placement      Gout, unspecified      Hydrocephalus (H) 2015     Old myocardial infarction 3/1998    Hx of MI     Osteoarthrosis, unspecified whether generalized or localized, unspecified site     Diffuse migratory arthritis     Other and unspecified hyperlipidemia      Paroxysmal supraventricular tachycardia (H)     Hx of PAT     Pure hypercholesterolemia      Stented coronary artery      Current Outpatient Medications   Medication     acetaminophen 650 MG TABS     allopurinol (ZYLOPRIM) 100 MG tablet     apixaban ANTICOAGULANT (ELIQUIS) 5 MG tablet     aspirin EC 81 MG EC tablet     atorvastatin (LIPITOR) 40 MG tablet     carvedilol (COREG) 12.5 MG tablet     finasteride (PROSCAR) 5 MG tablet     FLUoxetine (PROZAC) 20 MG capsule     furosemide (LASIX) 20 MG tablet     losartan (COZAAR) 50 MG tablet     nitroglycerin (NITROSTAT) 0.4 MG SL tablet     polyethylene glycol (MIRALAX/GLYCOLAX) packet     No current facility-administered medications for this visit.      Social History     Tobacco Use     Smoking status: Former Smoker     Smokeless tobacco: Never Used     Tobacco comment: quit 25 yr ago   Substance Use Topics     Alcohol use: Yes      Alcohol/week: 3.0 - 4.0 standard drinks     Types: 3 - 4 Standard drinks or equivalent per week     Comment: couple times a week     Drug use: No         Review of Systems   CONSTITUTIONAL: NEGATIVE for fever, chills, change in weight  INTEGUMENTARY/SKIN: NEGATIVE for worrisome rashes, moles or lesions  EYES: NEGATIVE for vision changes or irritation  ENT/MOUTH: NEGATIVE for ear, mouth and throat problems  RESP: NEGATIVE for significant cough or SOB  CV: NEGATIVE for chest pain, palpitations or peripheral edema  GI: NEGATIVE for nausea, abdominal pain, heartburn, or change in bowel habits  : NEGATIVE for frequency, dysuria, or hematuria  MUSCULOSKELETAL:multiple joint pains  NEURO: NEGATIVE for weakness, dizziness or paresthesias  ENDOCRINE: NEGATIVE for temperature intolerance, skin/hair changes  HEME: NEGATIVE for bleeding problems  PSYCHIATRIC: NEGATIVE for changes in mood or affect      Objective    /74   Pulse 104   Temp 96.2  F (35.7  C) (Temporal)   Resp 16   Wt 103.9 kg (229 lb)   SpO2 96%   BMI 34.82 kg/m    Body mass index is 34.82 kg/m .  Physical Exam   GENERAL: healthy, alert and no distress  HENT: ear canals and TM's normal, nose and mouth without ulcers or lesions  NECK: no adenopathy, no asymmetry, masses, or scars and thyroid normal to palpation  RESP: lungs clear to auscultation - no rales, rhonchi or wheezes  CV: regular rate and rhythm, normal S1 S2, no S3 or S4, no murmur, click or rub, no peripheral edema and peripheral pulses strong  ABDOMEN: soft, nontender, no hepatosplenomegaly, no masses and bowel sounds normal  MS: no gross musculoskeletal defects noted, no edema  SKIN: no suspicious lesions or rashes  NEURO: Normal strength and tone, mentation intact and speech normal  PSYCH: mentation appears normal, affect normal/bright            Assessment & Plan     Endy was seen today for recheck medication, flu shot and imm/inj.    Diagnoses and all orders for this visit:    S/P  AVR (aortic valve replacement)  -     apixaban ANTICOAGULANT (ELIQUIS) 5 MG tablet; Take 1 tablet (5 mg) by mouth 2 times daily  -     furosemide (LASIX) 20 MG tablet; Take 1 tablet (20 mg) by mouth daily  -     CBC with platelets    Need for prophylactic vaccination and inoculation against influenza  -     FLUZONE HIGH DOSE 65+  [75077]  -     ADMIN INFLUENZA (For MEDICARE Patients ONLY) []    Acute gouty arthritis  -     allopurinol (ZYLOPRIM) 100 MG tablet; Take 1 tablet (100 mg) by mouth 2 times daily    Hyperlipidemia LDL goal <100  -     atorvastatin (LIPITOR) 40 MG tablet; Take 1 tablet (40 mg) by mouth daily    Hypertension goal BP (blood pressure) < 140/90  -     carvedilol (COREG) 12.5 MG tablet; Take 1 tablet (12.5 mg) by mouth 2 times daily (with meals)  -     furosemide (LASIX) 20 MG tablet; Take 1 tablet (20 mg) by mouth daily  -     losartan (COZAAR) 50 MG tablet; Take 1 tablet (50 mg) by mouth daily  -     Comprehensive metabolic panel  -     Albumin Random Urine Quantitative with Creat Ratio    Benign prostatic hyperplasia with lower urinary tract symptoms, symptom details unspecified  -     finasteride (PROSCAR) 5 MG tablet; TAKE ONE TABLET BY MOUTH ONCE DAILY - NEED OFFICE VISIT FOR FURTHER REFILLS    Mild major depression (H)  -     FLUoxetine (PROZAC) 20 MG capsule; Take 3 capsules (60 mg) by mouth daily    The 80-year-old gentleman who is , they traveled down to [place] for the winter.  He is doing okay he has a lot of pain from his joint pains and arthritis but they are leaving soon so cannot have him see orthopedics.    Aortic valve replacement he is on anticoagulation doing okay, meds are refilled continue Coreg and losartan.    Hypertension is treated well 136/74 continue Coreg, Lasix, Cozaar, check labs today.    BPH symptoms he is on Proscar tolerating this.    Hyperlipidemia is treated with atorvastatin will refill that for the next year.    He did have a flu shot today    We  will see him back in the spring when they are back from [place] for his annual wellness check.    Depression is on Prozac 3 pills a day does have a high PHQ score but I think he will be better when he gets to [place].            No follow-ups on file.    Richi Alvarez MD  [facility]

## 2020-11-02 NOTE — PROGRESS NOTES
Prior to immunization administration, verified patients identity using patient s name and date of birth. Please see Immunization Activity for additional information.     Screening Questionnaire for Adult Immunization    Are you sick today?   No   Do you have allergies to medications, food, a vaccine component or latex?   Yes   Have you ever had a serious reaction after receiving a vaccination?   No   Do you have a long-term health problem with heart, lung, kidney, or metabolic disease (e.g., diabetes), asthma, a blood disorder, no spleen, complement component deficiency, a cochlear implant, or a spinal fluid leak?  Are you on long-term aspirin therapy?   Yes   Do you have cancer, leukemia, HIV/AIDS, or any other immune system problem?   No   Do you have a parent, brother, or sister with an immune system problem?   No   In the past 3 months, have you taken medications that affect  your immune system, such as prednisone, other steroids, or anticancer drugs; drugs for the treatment of rheumatoid arthritis, Crohn s disease, or psoriasis; or have you had radiation treatments?   No   Have you had a seizure, or a brain or other nervous system problem?   No   During the past year, have you received a transfusion of blood or blood    products, or been given immune (gamma) globulin or antiviral drug?   No   For women: Are you pregnant or is there a chance you could become       pregnant during the next month?   No   Have you received any vaccinations in the past 4 weeks?   No     Immunization questionnaire was positive for at least one answer.  Notified Yes.        Per orders of Dr. Richi Alvarez, injection of HD Influenza given by Jennifer Hernandez CMA. Patient instructed to remain in clinic for 15 minutes afterwards, and to report any adverse reaction to me immediately.       Screening performed by Jennifer Hernandez CMA on 11/2/2020 at 5:39 PM.

## 2020-12-01 DIAGNOSIS — I10 HYPERTENSION GOAL BP (BLOOD PRESSURE) < 140/90: ICD-10-CM

## 2020-12-02 RX ORDER — LOSARTAN POTASSIUM 50 MG/1
TABLET ORAL
Qty: 30 TABLET | Refills: 0 | Status: SHIPPED | OUTPATIENT
Start: 2020-12-02 | End: 2021-01-04

## 2020-12-02 NOTE — TELEPHONE ENCOUNTER
Patient has been seen in the past 30 days. LOV: 11/2/20 with Dr. Alvarez   Routing to PCP to advise.......SON Ha

## 2021-01-01 ENCOUNTER — OFFICE VISIT (OUTPATIENT)
Dept: ORTHOPEDICS | Facility: CLINIC | Age: 81
End: 2021-01-01
Payer: MEDICARE

## 2021-01-01 ENCOUNTER — HOSPITAL ENCOUNTER (OUTPATIENT)
Dept: GENERAL RADIOLOGY | Facility: CLINIC | Age: 81
End: 2021-10-01
Attending: INTERNAL MEDICINE
Payer: MEDICARE

## 2021-01-01 ENCOUNTER — ANCILLARY PROCEDURE (OUTPATIENT)
Dept: GENERAL RADIOLOGY | Facility: CLINIC | Age: 81
End: 2021-01-01
Attending: ORTHOPAEDIC SURGERY
Payer: MEDICARE

## 2021-01-01 ENCOUNTER — OFFICE VISIT (OUTPATIENT)
Dept: INTERNAL MEDICINE | Facility: CLINIC | Age: 81
End: 2021-01-01
Payer: MEDICARE

## 2021-01-01 VITALS
HEIGHT: 68 IN | DIASTOLIC BLOOD PRESSURE: 86 MMHG | BODY MASS INDEX: 35.16 KG/M2 | SYSTOLIC BLOOD PRESSURE: 152 MMHG | WEIGHT: 232 LBS

## 2021-01-01 VITALS — SYSTOLIC BLOOD PRESSURE: 133 MMHG | DIASTOLIC BLOOD PRESSURE: 81 MMHG

## 2021-01-01 VITALS
HEART RATE: 86 BPM | SYSTOLIC BLOOD PRESSURE: 134 MMHG | BODY MASS INDEX: 35.28 KG/M2 | DIASTOLIC BLOOD PRESSURE: 96 MMHG | WEIGHT: 232 LBS | TEMPERATURE: 97.4 F | OXYGEN SATURATION: 98 % | RESPIRATION RATE: 16 BRPM

## 2021-01-01 VITALS
BODY MASS INDEX: 34.91 KG/M2 | RESPIRATION RATE: 18 BRPM | TEMPERATURE: 97.1 F | SYSTOLIC BLOOD PRESSURE: 134 MMHG | HEART RATE: 93 BPM | DIASTOLIC BLOOD PRESSURE: 72 MMHG | OXYGEN SATURATION: 96 % | WEIGHT: 229.6 LBS

## 2021-01-01 VITALS
DIASTOLIC BLOOD PRESSURE: 74 MMHG | HEART RATE: 90 BPM | BODY MASS INDEX: 35.16 KG/M2 | HEIGHT: 68 IN | WEIGHT: 232 LBS | OXYGEN SATURATION: 95 % | SYSTOLIC BLOOD PRESSURE: 126 MMHG | TEMPERATURE: 97.1 F

## 2021-01-01 VITALS — HEIGHT: 68 IN | RESPIRATION RATE: 18 BRPM | WEIGHT: 234 LBS | BODY MASS INDEX: 35.46 KG/M2

## 2021-01-01 DIAGNOSIS — M10.9 ACUTE GOUTY ARTHRITIS: ICD-10-CM

## 2021-01-01 DIAGNOSIS — M25.562 CHRONIC PAIN OF LEFT KNEE: ICD-10-CM

## 2021-01-01 DIAGNOSIS — N18.31 STAGE 3A CHRONIC KIDNEY DISEASE (H): ICD-10-CM

## 2021-01-01 DIAGNOSIS — Z23 NEED FOR PROPHYLACTIC VACCINATION AND INOCULATION AGAINST INFLUENZA: ICD-10-CM

## 2021-01-01 DIAGNOSIS — G89.29 CHRONIC PAIN OF LEFT KNEE: ICD-10-CM

## 2021-01-01 DIAGNOSIS — M72.0 DUPUYTREN CONTRACTURE: ICD-10-CM

## 2021-01-01 DIAGNOSIS — M65.30 TRIGGER FINGER, ACQUIRED: ICD-10-CM

## 2021-01-01 DIAGNOSIS — I48.20 CHRONIC ATRIAL FIBRILLATION (H): ICD-10-CM

## 2021-01-01 DIAGNOSIS — M17.12 ARTHRITIS OF LEFT KNEE: Primary | ICD-10-CM

## 2021-01-01 DIAGNOSIS — I10 HYPERTENSION GOAL BP (BLOOD PRESSURE) < 140/90: ICD-10-CM

## 2021-01-01 DIAGNOSIS — N40.1 BENIGN PROSTATIC HYPERPLASIA WITH LOWER URINARY TRACT SYMPTOMS, SYMPTOM DETAILS UNSPECIFIED: ICD-10-CM

## 2021-01-01 DIAGNOSIS — G91.2 NPH (NORMAL PRESSURE HYDROCEPHALUS) (H): ICD-10-CM

## 2021-01-01 DIAGNOSIS — S89.91XA RIGHT KNEE INJURY, INITIAL ENCOUNTER: Primary | ICD-10-CM

## 2021-01-01 DIAGNOSIS — E78.5 HYPERLIPIDEMIA LDL GOAL <100: ICD-10-CM

## 2021-01-01 DIAGNOSIS — E66.01 MORBID OBESITY (H): ICD-10-CM

## 2021-01-01 DIAGNOSIS — Z95.2 S/P AVR (AORTIC VALVE REPLACEMENT): ICD-10-CM

## 2021-01-01 DIAGNOSIS — M25.50 MULTIPLE JOINT PAIN: ICD-10-CM

## 2021-01-01 DIAGNOSIS — M25.562 CHRONIC PAIN OF LEFT KNEE: Primary | ICD-10-CM

## 2021-01-01 DIAGNOSIS — M65.30 TRIGGER FINGER, ACQUIRED: Primary | ICD-10-CM

## 2021-01-01 DIAGNOSIS — G62.9 NEUROPATHY: ICD-10-CM

## 2021-01-01 DIAGNOSIS — I10 HYPERTENSION GOAL BP (BLOOD PRESSURE) < 140/90: Primary | ICD-10-CM

## 2021-01-01 DIAGNOSIS — F32.0 MILD MAJOR DEPRESSION (H): ICD-10-CM

## 2021-01-01 DIAGNOSIS — S89.91XA RIGHT KNEE INJURY, INITIAL ENCOUNTER: ICD-10-CM

## 2021-01-01 DIAGNOSIS — G89.29 CHRONIC PAIN OF LEFT KNEE: Primary | ICD-10-CM

## 2021-01-01 LAB
ALBUMIN SERPL-MCNC: 3.7 G/DL (ref 3.4–5)
ALP SERPL-CCNC: 77 U/L (ref 40–150)
ALT SERPL W P-5'-P-CCNC: 24 U/L (ref 0–70)
ANA SER QL IF: NEGATIVE
ANION GAP SERPL CALCULATED.3IONS-SCNC: <1 MMOL/L (ref 3–14)
AST SERPL W P-5'-P-CCNC: 16 U/L (ref 0–45)
BILIRUB SERPL-MCNC: 0.4 MG/DL (ref 0.2–1.3)
BUN SERPL-MCNC: 29 MG/DL (ref 7–30)
CALCIUM SERPL-MCNC: 8.9 MG/DL (ref 8.5–10.1)
CHLORIDE SERPL-SCNC: 110 MMOL/L (ref 94–109)
CHOLEST SERPL-MCNC: 116 MG/DL
CO2 SERPL-SCNC: 30 MMOL/L (ref 20–32)
CREAT SERPL-MCNC: 1.36 MG/DL (ref 0.66–1.25)
CRP SERPL-MCNC: 4 MG/L (ref 0–8)
ERYTHROCYTE [DISTWIDTH] IN BLOOD BY AUTOMATED COUNT: 13.4 % (ref 10–15)
ERYTHROCYTE [SEDIMENTATION RATE] IN BLOOD BY WESTERGREN METHOD: 12 MM/H (ref 0–20)
GFR SERPL CREATININE-BSD FRML MDRD: 48 ML/MIN/{1.73_M2}
GLUCOSE SERPL-MCNC: 89 MG/DL (ref 70–99)
HCT VFR BLD AUTO: 41.6 % (ref 40–53)
HDLC SERPL-MCNC: 50 MG/DL
HGB BLD-MCNC: 13.7 G/DL (ref 13.3–17.7)
LDLC SERPL CALC-MCNC: 44 MG/DL
MCH RBC QN AUTO: 32.1 PG (ref 26.5–33)
MCHC RBC AUTO-ENTMCNC: 32.9 G/DL (ref 31.5–36.5)
MCV RBC AUTO: 97 FL (ref 78–100)
NONHDLC SERPL-MCNC: 66 MG/DL
PLATELET # BLD AUTO: 147 10E9/L (ref 150–450)
POTASSIUM SERPL-SCNC: 5.1 MMOL/L (ref 3.4–5.3)
PROT SERPL-MCNC: 7.5 G/DL (ref 6.8–8.8)
RBC # BLD AUTO: 4.27 10E12/L (ref 4.4–5.9)
RHEUMATOID FACT SER NEPH-ACNC: <7 IU/ML (ref 0–20)
SODIUM SERPL-SCNC: 140 MMOL/L (ref 133–144)
TRIGL SERPL-MCNC: 108 MG/DL
TSH SERPL DL<=0.005 MIU/L-ACNC: 1.7 MU/L (ref 0.4–4)
URATE SERPL-MCNC: 5 MG/DL (ref 3.5–7.2)
WBC # BLD AUTO: 7.7 10E9/L (ref 4–11)

## 2021-01-01 PROCEDURE — 20550 NJX 1 TENDON SHEATH/LIGAMENT: CPT | Mod: 59 | Performed by: ORTHOPAEDIC SURGERY

## 2021-01-01 PROCEDURE — 99214 OFFICE O/P EST MOD 30 MIN: CPT | Performed by: INTERNAL MEDICINE

## 2021-01-01 PROCEDURE — 85652 RBC SED RATE AUTOMATED: CPT | Performed by: INTERNAL MEDICINE

## 2021-01-01 PROCEDURE — 80061 LIPID PANEL: CPT | Performed by: INTERNAL MEDICINE

## 2021-01-01 PROCEDURE — 86038 ANTINUCLEAR ANTIBODIES: CPT | Performed by: INTERNAL MEDICINE

## 2021-01-01 PROCEDURE — 80053 COMPREHEN METABOLIC PANEL: CPT | Performed by: INTERNAL MEDICINE

## 2021-01-01 PROCEDURE — 73565 X-RAY EXAM OF KNEES: CPT

## 2021-01-01 PROCEDURE — 73564 X-RAY EXAM KNEE 4 OR MORE: CPT | Mod: TC | Performed by: RADIOLOGY

## 2021-01-01 PROCEDURE — 73502 X-RAY EXAM HIP UNI 2-3 VIEWS: CPT

## 2021-01-01 PROCEDURE — 20605 DRAIN/INJ JOINT/BURSA W/O US: CPT | Performed by: INTERNAL MEDICINE

## 2021-01-01 PROCEDURE — 99213 OFFICE O/P EST LOW 20 MIN: CPT | Mod: 25 | Performed by: ORTHOPAEDIC SURGERY

## 2021-01-01 PROCEDURE — 99214 OFFICE O/P EST MOD 30 MIN: CPT | Mod: 25 | Performed by: INTERNAL MEDICINE

## 2021-01-01 PROCEDURE — 99207 PR NO CHARGE LOS: CPT | Performed by: INTERNAL MEDICINE

## 2021-01-01 PROCEDURE — 20550 NJX 1 TENDON SHEATH/LIGAMENT: CPT | Mod: F3 | Performed by: ORTHOPAEDIC SURGERY

## 2021-01-01 PROCEDURE — 36415 COLL VENOUS BLD VENIPUNCTURE: CPT | Performed by: INTERNAL MEDICINE

## 2021-01-01 PROCEDURE — 86431 RHEUMATOID FACTOR QUANT: CPT | Performed by: INTERNAL MEDICINE

## 2021-01-01 PROCEDURE — 86140 C-REACTIVE PROTEIN: CPT | Performed by: INTERNAL MEDICINE

## 2021-01-01 PROCEDURE — 99213 OFFICE O/P EST LOW 20 MIN: CPT | Performed by: ORTHOPAEDIC SURGERY

## 2021-01-01 PROCEDURE — 85027 COMPLETE CBC AUTOMATED: CPT | Performed by: INTERNAL MEDICINE

## 2021-01-01 PROCEDURE — 84443 ASSAY THYROID STIM HORMONE: CPT | Performed by: INTERNAL MEDICINE

## 2021-01-01 PROCEDURE — G0008 ADMIN INFLUENZA VIRUS VAC: HCPCS | Performed by: INTERNAL MEDICINE

## 2021-01-01 PROCEDURE — 84550 ASSAY OF BLOOD/URIC ACID: CPT | Performed by: INTERNAL MEDICINE

## 2021-01-01 PROCEDURE — 90662 IIV NO PRSV INCREASED AG IM: CPT | Performed by: INTERNAL MEDICINE

## 2021-01-01 RX ORDER — FINASTERIDE 5 MG/1
TABLET, FILM COATED ORAL
Qty: 90 TABLET | Refills: 3 | Status: SHIPPED | OUTPATIENT
Start: 2021-01-01

## 2021-01-01 RX ORDER — CARVEDILOL 12.5 MG/1
12.5 TABLET ORAL 2 TIMES DAILY WITH MEALS
Qty: 180 TABLET | Refills: 3 | Status: SHIPPED | OUTPATIENT
Start: 2021-01-01

## 2021-01-01 RX ORDER — GABAPENTIN 100 MG/1
CAPSULE ORAL
Qty: 180 CAPSULE | Refills: 3 | Status: SHIPPED | OUTPATIENT
Start: 2021-01-01 | End: 2021-01-01

## 2021-01-01 RX ORDER — TRIAMCINOLONE ACETONIDE 40 MG/ML
20 INJECTION, SUSPENSION INTRA-ARTICULAR; INTRAMUSCULAR
Status: SHIPPED | OUTPATIENT
Start: 2021-01-01

## 2021-01-01 RX ORDER — LOSARTAN POTASSIUM 50 MG/1
TABLET ORAL
Qty: 90 TABLET | Refills: 0 | Status: SHIPPED | OUTPATIENT
Start: 2021-01-01 | End: 2021-01-01

## 2021-01-01 RX ORDER — FUROSEMIDE 20 MG
20 TABLET ORAL DAILY
Qty: 90 TABLET | Refills: 3 | Status: SHIPPED | OUTPATIENT
Start: 2021-01-01

## 2021-01-01 RX ORDER — TRIAMCINOLONE ACETONIDE 40 MG/ML
40 INJECTION, SUSPENSION INTRA-ARTICULAR; INTRAMUSCULAR ONCE
Status: COMPLETED | OUTPATIENT
Start: 2021-01-01 | End: 2021-01-01

## 2021-01-01 RX ORDER — LOSARTAN POTASSIUM 50 MG/1
50 TABLET ORAL DAILY
Qty: 90 TABLET | Refills: 3 | Status: SHIPPED | OUTPATIENT
Start: 2021-01-01

## 2021-01-01 RX ORDER — LIDOCAINE HYDROCHLORIDE 10 MG/ML
0.5 INJECTION, SOLUTION INFILTRATION; PERINEURAL
Status: SHIPPED | OUTPATIENT
Start: 2021-01-01

## 2021-01-01 RX ORDER — LOSARTAN POTASSIUM 50 MG/1
TABLET ORAL
Qty: 90 TABLET | Refills: 0 | OUTPATIENT
Start: 2021-01-01

## 2021-01-01 RX ORDER — ALLOPURINOL 100 MG/1
100 TABLET ORAL 2 TIMES DAILY
Qty: 180 TABLET | Refills: 3 | Status: SHIPPED | OUTPATIENT
Start: 2021-01-01

## 2021-01-01 RX ORDER — ATORVASTATIN CALCIUM 40 MG/1
40 TABLET, FILM COATED ORAL DAILY
Qty: 90 TABLET | Refills: 3 | Status: SHIPPED | OUTPATIENT
Start: 2021-01-01

## 2021-01-01 RX ADMIN — LIDOCAINE HYDROCHLORIDE 0.5 ML: 10 INJECTION, SOLUTION INFILTRATION; PERINEURAL at 15:48

## 2021-01-01 RX ADMIN — TRIAMCINOLONE ACETONIDE 20 MG: 40 INJECTION, SUSPENSION INTRA-ARTICULAR; INTRAMUSCULAR at 15:48

## 2021-01-01 RX ADMIN — LIDOCAINE HYDROCHLORIDE 0.5 ML: 10 INJECTION, SOLUTION INFILTRATION; PERINEURAL at 15:46

## 2021-01-01 RX ADMIN — TRIAMCINOLONE ACETONIDE 40 MG: 40 INJECTION, SUSPENSION INTRA-ARTICULAR; INTRAMUSCULAR at 12:15

## 2021-01-01 RX ADMIN — TRIAMCINOLONE ACETONIDE 20 MG: 40 INJECTION, SUSPENSION INTRA-ARTICULAR; INTRAMUSCULAR at 15:46

## 2021-01-01 ASSESSMENT — MIFFLIN-ST. JEOR
SCORE: 1740.92
SCORE: 1731.85
SCORE: 1731.85

## 2021-01-01 ASSESSMENT — PATIENT HEALTH QUESTIONNAIRE - PHQ9: SUM OF ALL RESPONSES TO PHQ QUESTIONS 1-9: 16

## 2021-01-01 ASSESSMENT — PAIN SCALES - GENERAL
PAINLEVEL: EXTREME PAIN (8)
PAINLEVEL: SEVERE PAIN (7)
PAINLEVEL: MODERATE PAIN (5)

## 2021-01-04 RX ORDER — LOSARTAN POTASSIUM 50 MG/1
TABLET ORAL
Qty: 30 TABLET | Refills: 0 | Status: SHIPPED | OUTPATIENT
Start: 2021-01-04 | End: 2021-02-01

## 2021-01-04 NOTE — TELEPHONE ENCOUNTER
Routing refill request to provider for review/approval because:  Labs out of range:  Creatinine.     Imani Palacios RN

## 2021-01-09 ENCOUNTER — HEALTH MAINTENANCE LETTER (OUTPATIENT)
Age: 81
End: 2021-01-09

## 2021-01-30 DIAGNOSIS — I10 HYPERTENSION GOAL BP (BLOOD PRESSURE) < 140/90: ICD-10-CM

## 2021-01-30 NOTE — LETTER
February 3, 2021      Endy Navarro  [address] [facility] [address] [facility] [address]        Dear Endy,     You are due to be seen to recheck your meds, Please call the clinic and make an appt.       Sincerely,        Richi Alvarez MD

## 2021-02-01 RX ORDER — LOSARTAN POTASSIUM 50 MG/1
50 TABLET ORAL DAILY
Qty: 30 TABLET | Refills: 0 | Status: SHIPPED | OUTPATIENT
Start: 2021-02-01 | End: 2021-03-01

## 2021-02-01 NOTE — TELEPHONE ENCOUNTER
Routing to team to set up F2F appointment for patient for yearly.    Routing refill request to provider for review/approval because:  Labs out of range:  Cr  Creatinine   Date Value Ref Range Status   11/02/2020 1.60 (H) 0.66 - 1.25 mg/dL Final     Last Written Prescription Date:  1/4/2020  Last Fill Quantity: 30,  # refills: 0   Last office visit: 11/2/2020 with prescribing provider:     Future Office Visit:      Sagrario Rivas, LORNAN, RN

## 2021-02-27 DIAGNOSIS — I10 HYPERTENSION GOAL BP (BLOOD PRESSURE) < 140/90: ICD-10-CM

## 2021-03-01 RX ORDER — LOSARTAN POTASSIUM 50 MG/1
TABLET ORAL
Qty: 90 TABLET | Refills: 0 | Status: SHIPPED | OUTPATIENT
Start: 2021-03-01 | End: 2021-01-01

## 2021-03-01 NOTE — TELEPHONE ENCOUNTER
Routing refill request to provider for review/approval because:  Labs out of range:  Creatinine    LORNA TorresN, RN  [facility]

## 2021-05-04 NOTE — LETTER
5/4/2021         RE: Endy Navarro  [address] [facility] [address]        Dear Colleague,    Thank you for referring your patient, Endy Navarro, to the [facility]. Please see a copy of my visit note below.    ORTHOPEDIC CONSULT      Chief Complaint: Endy Navarro is a 80 year old right hand dominant male who is retired.        Chief Complaints and History of Present Illnesses   Patient presents with     Right Knee - Pain             History of Present Illness: The patient and his wife are present during the examination.  His left knee is usually worse than his right but his right knee is with one is hurting.  It is really hurting more medial to the patella over the area of the medial femoral condyle.    Onset: 6 day(s) ago. Patient describes injury as a fall out of his bed in the middle of the night. He landed on both knees and hit is arm on the night stand. He is walking with a cane  Location of Pain: right medial  Worsened by: twisting on the lower leg and pivoting.  Better with: tylenol  Treatments tried: rest/activity avoidance  Associated symptoms: swelling and feeling of instability    Physical Exam:  There is no tenderness over the medial or lateral joint line anterior.  No tenderness over the patella or tibial tubercle.  Range of motion is -590 degrees of flexion with minor pain in the medial aspect of the knee.  Minimal crepitus is felt.  The knee is stable anteriorly and posteriorly.    Diagnostic Modalities:    Independent visualization of the images was performed.  I personally interpreted the imaging studies.  In the right knee the joint spaces are maintained on the medial and lateral compartments.  There are some small osteophytes in the patellar tendon which seems to be old due to the rounded edges.    Impression: Right knee contusion    Plan:  All of the above pertinent physical exam and imaging modalities findings was reviewed.  I advised the  patient to take Voltaren gel and rub it on the knee affected area 3-4 times per day as well as use a sleeve.  I would have prescribed an anti-inflammatory but the patient is already on Eliquis secondary to atrial fibrillation.  I explained that we really do not want to add another anti-inflammatory on top of the Eliquis.  I have advised him that if he is not better in 2 weeks that I would like to see him back.            BP Readings from Last 1 Encounters:   11/02/20 136/74                 Again, thank you for allowing me to participate in the care of your patient.        Sincerely,        Da Ortega, DO

## 2021-05-04 NOTE — PROGRESS NOTES
ORTHOPEDIC CONSULT      Chief Complaint: Endy Navarro is a 80 year old right hand dominant male who is retired.        Chief Complaints and History of Present Illnesses   Patient presents with     Right Knee - Pain             History of Present Illness: The patient and his wife are present during the examination.  His left knee is usually worse than his right but his right knee is with one is hurting.  It is really hurting more medial to the patella over the area of the medial femoral condyle.    Onset: 6 day(s) ago. Patient describes injury as a fall out of his bed in the middle of the night. He landed on both knees and hit is arm on the night stand. He is walking with a cane  Location of Pain: right medial  Worsened by: twisting on the lower leg and pivoting.  Better with: tylenol  Treatments tried: rest/activity avoidance  Associated symptoms: swelling and feeling of instability    Physical Exam:  There is no tenderness over the medial or lateral joint line anterior.  No tenderness over the patella or tibial tubercle.  Range of motion is -590 degrees of flexion with minor pain in the medial aspect of the knee.  Minimal crepitus is felt.  The knee is stable anteriorly and posteriorly.    Diagnostic Modalities:    Independent visualization of the images was performed.  I personally interpreted the imaging studies.  In the right knee the joint spaces are maintained on the medial and lateral compartments.  There are some small osteophytes in the patellar tendon which seems to be old due to the rounded edges.    Impression: Right knee contusion    Plan:  All of the above pertinent physical exam and imaging modalities findings was reviewed.  I advised the patient to take Voltaren gel and rub it on the knee affected area 3-4 times per day as well as use a sleeve.  I would have prescribed an anti-inflammatory but the patient is already on Eliquis secondary to atrial fibrillation.  I explained that we really do not  want to add another anti-inflammatory on top of the Eliquis.  I have advised him that if he is not better in 2 weeks that I would like to see him back.            BP Readings from Last 1 Encounters:   11/02/20 136/74

## 2021-05-28 NOTE — TELEPHONE ENCOUNTER
Routing refill request to provider for review/approval because:  Labs out of range:  Creatinine    LORNA TorresN, RN  [facility]

## 2021-06-29 PROBLEM — G91.2 NPH (NORMAL PRESSURE HYDROCEPHALUS) (H): Status: ACTIVE | Noted: 2021-01-01

## 2021-06-29 PROBLEM — N18.30 CHRONIC KIDNEY DISEASE, STAGE 3 (H): Status: ACTIVE | Noted: 2021-01-01

## 2021-06-29 NOTE — PROGRESS NOTES
Assessment & Plan     Hypertension goal BP (blood pressure) < 140/90  Patient's blood pressure is controlled, will check his labs, continue his medications of carvedilol and losartan.  Aortic valve replacement he is well heart sounds are normal  - Lipid Profile  - Comprehensive metabolic panel  - TSH with free T4 reflex    Acute gouty arthritis  History of gout we will check his uric acid and CBC continue his allopurinol.  - Uric acid  - CBC with platelets    S/P AVR (aortic valve replacement)  For his aortic valve we will continue the Eliquis.    Chronic atrial fibrillation (H)  Chronic A. fib he is on Eliquis doing well rate controlled  - TSH with free T4 reflex    Multiple joint pain  Multiple joint pain limited to the lab work affecting his sed rate CRP NAY and rheumatoid factor.  Most likely is osteoarthritis.  - TSH with free T4 reflex  - ESR: Erythrocyte sedimentation rate  - CRP, inflammation  - Anti Nuclear Kimberly IgG by IFA with Reflex  - Rheumatoid factor  - CBC with platelets  - gabapentin (NEURONTIN) 100 MG capsule; 1 twice a day for two weeks then 2 twice a day for two weeks then 3 twice a day    Neuropathy  Neuropathy which has had for a long time affecting his legs will start him on gabapentin.  He is aware of a side effects.  We will start at 100 mg twice a day go up to 200 mg twice a day and then 300 mg twice a day  - gabapentin (NEURONTIN) 100 MG capsule; 1 twice a day for two weeks then 2 twice a day for two weeks then 3 twice a day    Trigger finger, acquired  Trigger finger around orthopedics as this is bothering him and he can open his hand in the morning.  Likely needs surgery for this.  - Orthopedic  Referral; Future    Stage 3a chronic kidney disease  Stage III kidney disease is stable we will check his labs today.    NPH (normal pressure hydrocephalus) (H)  Normal pressure hydrocephalus has been stable patient will let us know if he has any new symptoms.    Mild major depression  "(H)  Major depression he will continue on Prozac 60 mg a day.    Morbid obesity (H)  Morbid obesity his BMI is 35 recommended weight loss they have a supplement they want to try which may be okay with pathologic reflexes gout.           BMI:   Estimated body mass index is 35.28 kg/m  as calculated from the following:    Height as of this encounter: 1.727 m (5' 8\").    Weight as of this encounter: 105.2 kg (232 lb).           No follow-ups on file.    Richi Alvarez MD  [facility] is a 80 year old who presents for the following health issues here with his wife.     HPI     Chief Complaint   Patient presents with     Recheck Medication     CAD  Anxiety     Joint Pain     multiple joint pain     Having pain in his joints, both knees.  Saw orthopedics and got voltaren gel which doesn't help him much.   Feet are sore and numb from knees down. Has known neuropathy.     Chronic lower back pains.      Left hand 4th and 5th trigger fingers.     Past Medical History:   Diagnosis Date     Anxiety state, unspecified      Atrial fibrillation (H)      Bell's palsy 12/12/1997     Bioprosthetic aortic valve replacement during current hospitalization      Coronary atherosclerosis of unspecified type of vessel, native or graft     coronary artery disease with history of MI and stent placement      Gout, unspecified      Hydrocephalus (H) 2015     Old myocardial infarction 3/1998    Hx of MI     Osteoarthrosis, unspecified whether generalized or localized, unspecified site     Diffuse migratory arthritis     Other and unspecified hyperlipidemia      Paroxysmal supraventricular tachycardia (H)     Hx of PAT     Pure hypercholesterolemia      Stented coronary artery      Current Outpatient Medications   Medication     acetaminophen 650 MG TABS     allopurinol (ZYLOPRIM) 100 MG tablet     apixaban ANTICOAGULANT (ELIQUIS) 5 MG tablet     aspirin EC 81 MG EC tablet     atorvastatin (LIPITOR) 40 " "MG tablet     carvedilol (COREG) 12.5 MG tablet     finasteride (PROSCAR) 5 MG tablet     FLUoxetine (PROZAC) 20 MG capsule     furosemide (LASIX) 20 MG tablet     gabapentin (NEURONTIN) 100 MG capsule     losartan (COZAAR) 50 MG tablet     polyethylene glycol (MIRALAX/GLYCOLAX) packet     nitroglycerin (NITROSTAT) 0.4 MG SL tablet     No current facility-administered medications for this visit.      Social History     Tobacco Use     Smoking status: Former Smoker     Smokeless tobacco: Never Used     Tobacco comment: quit 25 yr ago   Substance Use Topics     Alcohol use: Yes     Alcohol/week: 3.0 - 4.0 standard drinks     Types: 3 - 4 Standard drinks or equivalent per week     Comment: couple times a week     Drug use: No       Review of Systems         Objective    /74   Pulse 90   Temp 97.1  F (36.2  C) (Temporal)   Ht 1.727 m (5' 8\")   Wt 105.2 kg (232 lb)   SpO2 95%   BMI 35.28 kg/m    Body mass index is 35.28 kg/m .  Physical Exam   No acute distress  Heart is regular  Lungs are clear extremities have trace edema  His hand has trigger finger of the fourth and fifth digit                "

## 2021-06-30 NOTE — PROGRESS NOTES
ORTHOPEDIC CONSULT      Chief Complaint: Endy Navarro is a 80 year old right hand dominant male who is retired.      Left 4th and 5th digit pain            History of Present Illness:     Onset: Few month(s) ago. Reports insidious onset without acute precipitating event.. Fingers get stuck and has to force them open  Location of Pain: Left 4th and 5th digits. Pain radiates into his palm along tendons  Worsened by: gripping, upon waking in the morning  Better with: unknown  Treatments tried: no treatment tried to date  Associated symptoms: no distal numbness or tingling; swelling noted in palm area    Physical Exam:  There is triggering of the left fourth and fifth digits.  The left fourth tendon is raised proximal to the A1 pulley.  Distal motor and sensory examinations otherwise grossly intact.      Impression: Left fourth and fifth trigger finger, left fourth Dupuytren's contracture    Plan:  All of the above pertinent physical exam and imaging modalities findings was reviewed.  I advised the patient that he may need a combination of trigger release and effusions release.  I advised him that our partner, Dr. Gaxiola does this procedure.  We set the patient up for an appointment next Tuesday.            BP Readings from Last 1 Encounters:   06/29/21 126/74

## 2021-07-02 NOTE — LETTER
7/2/2021         RE: Endy Navarro  [address] [facility] [address]        Dear Colleague,    Thank you for referring your patient, Endy Navarro, to the [facility]. Please see a copy of my visit note below.    ORTHOPEDIC CONSULT      Chief Complaint: Endy Navarro is a 80 year old right hand dominant male who is retired.      Left 4th and 5th digit pain            History of Present Illness:     Onset: Few month(s) ago. Reports insidious onset without acute precipitating event.. Fingers get stuck and has to force them open  Location of Pain: Left 4th and 5th digits. Pain radiates into his palm along tendons  Worsened by: gripping, upon waking in the morning  Better with: unknown  Treatments tried: no treatment tried to date  Associated symptoms: no distal numbness or tingling; swelling noted in palm area    Physical Exam:  There is triggering of the left fourth and fifth digits.  The left fourth tendon is raised proximal to the A1 pulley.  Distal motor and sensory examinations otherwise grossly intact.      Impression: Left fourth and fifth trigger finger, left fourth Dupuytren's contracture    Plan:  All of the above pertinent physical exam and imaging modalities findings was reviewed.  I advised the patient that he may need a combination of trigger release and effusions release.  I advised him that our partner, Dr. Gaxiola does this procedure.  We set the patient up for an appointment next Tuesday.            BP Readings from Last 1 Encounters:   06/29/21 126/74                 Again, thank you for allowing me to participate in the care of your patient.        Sincerely,        Da Ortega DO

## 2021-07-08 PROBLEM — M65.30 TRIGGER FINGER, ACQUIRED: Status: ACTIVE | Noted: 2021-01-01

## 2021-07-08 PROBLEM — M72.0 DUPUYTREN CONTRACTURE: Status: ACTIVE | Noted: 2021-01-01

## 2021-07-08 NOTE — LETTER
7/8/2021         RE: Endy Navarro  [address] [facility] [id number]        Dear Colleague,    Thank you for referring your patient, Endy Navarro, to the [facility]. Please see a copy of my visit note below.    Hand / Upper Extremity Injection/Arthrocentesis: L ring A1    Date/Time: 7/8/2021 3:46 PM  Performed by: Delon Chairez MD  Authorized by: Delon Chairez MD     Indications:  Pain  Needle Size:  25 G  Guidance: landmark    Condition: trigger finger    Location:  Ring finger    Site:  L ring A1  Medications:  20 mg triamcinolone 40 MG/ML; 0.5 mL lidocaine 1 %  Medications comment:  0.5ml 0.5% bupivicaine  ND:69898-650-47  Lot: [id number]  8/31/21        Outcome:  Tolerated well, no immediate complications  Procedure discussed: discussed risks, benefits, and alternatives    Consent Given by:  Patient  Timeout: timeout called immediately prior to procedure    Prep: patient was prepped and draped in usual sterile fashion    Hand / Upper Extremity Injection/Arthrocentesis: L small A1    Date/Time: 7/8/2021 3:48 PM  Performed by: Delon Chairez MD  Authorized by: Delon Chairez MD     Indications:  Pain  Needle Size:  25 G  Guidance: landmark    Condition: trigger finger    Location:  Small finger    Site:  L small A1  Medications:  20 mg triamcinolone 40 MG/ML; 0.5 mL lidocaine 1 %  Medications comment:  0.5ml 0.5% bupivicaine  NDC:[id number]  Lot: [id number]  8/31/21        Outcome:  Tolerated well, no immediate complications  Procedure discussed: discussed risks, benefits, and alternatives    Consent Given by:  Patient  Timeout: timeout called immediately prior to procedure    Prep: patient was prepped and draped in usual sterile fashion            Endy Navarro is a 80 year old male who is seen in consultation at the request of Dr. Xander Ortega  for left hand pain and contracture.  He has had painful triggering of his left fourth  and fifth fingers.  He has had this for about 6 months.  He was also noted to have a Dupuytren's contracture to his left ring finger.  Because of the this combination he was sent to me for evaluation.  He has pain in the fingers especially when they are stuck.  He rates his pain at 3 out of 10.  He cannot take anti-inflammatories due to Eliquis treatment.    Past Medical History:   Diagnosis Date     Anxiety state, unspecified      Atrial fibrillation (H)      Bell's palsy 12/12/1997     Bioprosthetic aortic valve replacement during current hospitalization      Coronary atherosclerosis of unspecified type of vessel, native or graft     coronary artery disease with history of MI and stent placement      Gout, unspecified      Hydrocephalus (H) 2015     Old myocardial infarction 3/1998    Hx of MI     Osteoarthrosis, unspecified whether generalized or localized, unspecified site     Diffuse migratory arthritis     Other and unspecified hyperlipidemia      Paroxysmal supraventricular tachycardia (H)     Hx of PAT     Pure hypercholesterolemia      Stented coronary artery        Past Surgical History:   Procedure Laterality Date     C EXPLORATORY OF ABDOMEN  1/25/2007    Exploratory laparotomy.  Lysis of adhesions with reduction of internal hernia.     C ROTATOR CUFF STRAP      s/p rotator cuff surgery     COLONOSCOPY N/A 12/5/2016    Procedure: COMBINED COLONOSCOPY, SINGLE OR MULTIPLE BIOPSY/POLYPECTOMY BY BIOPSY;  Surgeon: Benjamin Cavazos MD;  Location: [facility]     HC COLONOSCOPY THRU STOMA, DIAGNOSTIC  8/23/2004     HC KNEE SCOPE,MED/LAT MENISCUS REPAIR       HC REMOVE TONSILS/ADENOIDS,<11 Y/O      unsure of age     Hydrocephalus  2015    shunt placed     INJECT EPIDURAL LUMBAR N/A 6/8/2017    Procedure: INJECT EPIDURAL LUMBAR;  lumbar epidural steroid injection Left Lumbar 5 to sacral 1;  Surgeon: Pawan Davenport MD;  Location: [facility]     REMOVAL OF SPERM DUCT(S)      Vasectomy     REPLACE VALVE AORTIC N/A 9/14/2015     Procedure: REPLACE VALVE AORTIC;  Surgeon: Sang Chan MD;  Location:  OR       Family History   Problem Relation Age of Onset     Cerebrovascular Disease Mother      Cerebrovascular Disease Father      Hypertension Father      C.A.D. Sister      C.A.D. Brother      Diabetes Maternal Aunt      Prostate Cancer Brother      Cancer - colorectal No family hx of        Social History     Socioeconomic History     Marital status:      Spouse name: Not on file     Number of children: Not on file     Years of education: Not on file     Highest education level: Not on file   Occupational History     Occupation: Laundry Mat   Social Needs     Financial resource strain: Not on file     Food insecurity     Worry: Not on file     Inability: Not on file     Transportation needs     Medical: Not on file     Non-medical: Not on file   Tobacco Use     Smoking status: Former Smoker     Smokeless tobacco: Never Used     Tobacco comment: quit 25 yr ago   Substance and Sexual Activity     Alcohol use: Yes     Alcohol/week: 3.0 - 4.0 standard drinks     Types: 3 - 4 Standard drinks or equivalent per week     Comment: couple times a week     Drug use: No     Sexual activity: Yes     Partners: Female   Lifestyle     Physical activity     Days per week: Not on file     Minutes per session: Not on file     Stress: Not on file   Relationships     Social connections     Talks on phone: Not on file     Gets together: Not on file     Attends Orthodoxy service: Not on file     Active member of club or organization: Not on file     Attends meetings of clubs or organizations: Not on file     Relationship status: Not on file     Intimate partner violence     Fear of current or ex partner: Not on file     Emotionally abused: Not on file     Physically abused: Not on file     Forced sexual activity: Not on file   Other Topics Concern      Service Yes     Blood Transfusions No     Caffeine Concern No     Comment:  coffee- 3-4 cups daily     Occupational Exposure No     Hobby Hazards No     Sleep Concern Yes     Comment: trouble sleeping     Stress Concern No     Weight Concern No     Special Diet No     Back Care Yes     Comment: Low back pain with riding in car     Exercise No     Bike Helmet Not Asked     Seat Belt Yes     Self-Exams Yes     Parent/sibling w/ CABG, MI or angioplasty before 65F 55M? Not Asked   Social History Narrative     Not on file       Current Outpatient Medications   Medication Sig Dispense Refill     acetaminophen 650 MG TABS Take 650 mg by mouth every 6 hours 100 tablet      allopurinol (ZYLOPRIM) 100 MG tablet Take 1 tablet (100 mg) by mouth 2 times daily 180 tablet 3     apixaban ANTICOAGULANT (ELIQUIS) 5 MG tablet Take 1 tablet (5 mg) by mouth 2 times daily 180 tablet 3     aspirin EC 81 MG EC tablet Take 1 tablet (81 mg) by mouth daily       atorvastatin (LIPITOR) 40 MG tablet Take 1 tablet (40 mg) by mouth daily 90 tablet 3     carvedilol (COREG) 12.5 MG tablet Take 1 tablet (12.5 mg) by mouth 2 times daily (with meals) 180 tablet 3     finasteride (PROSCAR) 5 MG tablet TAKE ONE TABLET BY MOUTH ONCE DAILY - NEED OFFICE VISIT FOR FURTHER REFILLS 90 tablet 3     FLUoxetine (PROZAC) 20 MG capsule Take 3 capsules (60 mg) by mouth daily 270 capsule 3     furosemide (LASIX) 20 MG tablet Take 1 tablet (20 mg) by mouth daily 90 tablet 3     gabapentin (NEURONTIN) 100 MG capsule 1 twice a day for two weeks then 2 twice a day for two weeks then 3 twice a day 180 capsule 3     losartan (COZAAR) 50 MG tablet TAKE ONE TABLET BY MOUTH ONCE DAILY 90 tablet 0     nitroglycerin (NITROSTAT) 0.4 MG SL tablet Place 1 tablet (0.4 mg) under the tongue every 5 minutes as needed for chest pain 25 tablet 4     polyethylene glycol (MIRALAX/GLYCOLAX) packet Take 17 g by mouth daily (Patient taking differently: Take 17 g by mouth as needed ) 7 packet        Allergies   Allergen Reactions     Seroquel [Quetiapine] Other  "(See Comments)     Felt funny       REVIEW OF SYSTEMS:  CONSTITUTIONAL:  NEGATIVE for fever, chills, change in weight, not feeling tired  SKIN:  NEGATIVE for worrisome rashes, no skin lumps, no skin ulcers and no non-healing wounds  EYES:  NEGATIVE for vision changes or irritation.  ENT/MOUTH:  NEGATIVE.  No hearing loss, no hoarseness, no difficulty swallowing.  RESP:  NEGATIVE. No cough or shortness of breath.  CV:  NEGATIVE for chest pain, palpitations or peripheral edema  GI:  NEGATIVE for nausea, abdominal pain, heartburn, or change in bowel habits  :  Negative. No dysuria, no hematuria  MUSCULOSKELETAL:  See HPI above  NEURO:  NEGATIVE . No headaches, no dizziness,  no numbness  ENDOCRINE:  NEGATIVE for temperature intolerance, skin/hair changes  HEME/ALLERGY/IMMUNE:  NEGATIVE for bleeding problems  PSYCHIATRIC:  NEGATIVE. no anxiety, no depression.     Exam:  Vitals: Resp 18   Ht 1.727 m (5' 8\")   Wt 106.1 kg (234 lb)   BMI 35.58 kg/m    BMI= Body mass index is 35.58 kg/m .  Constitutional:  healthy, alert and no distress  Neuro: Alert and Oriented x 3, Sensation grossly WNL.  Psych: Affect normal   Respiratory: Breathing not labored.  Cardiovascular: normal peripheral pulses  Lymph: no adenopathy  Skin: No rashes,worrisome lesions or skin problems  He has Dupuytren's band to his left ring finger.  It is not yet narciso the MCP joint.  The knee that is he has mild triggering of the left fourth and fifth fingers.  I can entrap the fingers with pushing at the A1 pulley.  He did not trigger on his own today.  Sensation, motor and circulation are intact.    Assessment:  1. Left 4th and 5th trigger finger.  2. Left ring Dupuytren's contracture.    Plan: We discussed options of trying steroid injection for the trigger fingers versus surgical release of the trigger fingers and excision of the Dupuytren's at the same time.  After thorough discussion of the risks and benefits he would like to try steroid " injections.He would like to proceed with injection.  With the patient's consent, I injected the left  ring and little finger tendon sheath with Kenalog 20 mg and lidocaine after sterile prep.    Return to clinic as needed.        Again, thank you for allowing me to participate in the care of your patient.        Sincerely,        Delon Chairez MD

## 2021-07-08 NOTE — PROGRESS NOTES
Endy Navarro is a 80 year old male who is seen in consultation at the request of Dr. Xander Ortega  for left hand pain and contracture.  He has had painful triggering of his left fourth and fifth fingers.  He has had this for about 6 months.  He was also noted to have a Dupuytren's contracture to his left ring finger.  Because of the this combination he was sent to me for evaluation.  He has pain in the fingers especially when they are stuck.  He rates his pain at 3 out of 10.  He cannot take anti-inflammatories due to Eliquis treatment.    Past Medical History:   Diagnosis Date     Anxiety state, unspecified      Atrial fibrillation (H)      Bell's palsy 12/12/1997     Bioprosthetic aortic valve replacement during current hospitalization      Coronary atherosclerosis of unspecified type of vessel, native or graft     coronary artery disease with history of MI and stent placement      Gout, unspecified      Hydrocephalus (H) 2015     Old myocardial infarction 3/1998    Hx of MI     Osteoarthrosis, unspecified whether generalized or localized, unspecified site     Diffuse migratory arthritis     Other and unspecified hyperlipidemia      Paroxysmal supraventricular tachycardia (H)     Hx of PAT     Pure hypercholesterolemia      Stented coronary artery        Past Surgical History:   Procedure Laterality Date     C EXPLORATORY OF ABDOMEN  1/25/2007    Exploratory laparotomy.  Lysis of adhesions with reduction of internal hernia.     C ROTATOR CUFF STRAP      s/p rotator cuff surgery     COLONOSCOPY N/A 12/5/2016    Procedure: COMBINED COLONOSCOPY, SINGLE OR MULTIPLE BIOPSY/POLYPECTOMY BY BIOPSY;  Surgeon: Benjamin Cavazos MD;  Location: [facility]     HC COLONOSCOPY THRU STOMA, DIAGNOSTIC  8/23/2004     HC KNEE SCOPE,MED/LAT MENISCUS REPAIR       HC REMOVE TONSILS/ADENOIDS,<13 Y/O      unsure of age     Hydrocephalus  2015    shunt placed     INJECT EPIDURAL LUMBAR N/A 6/8/2017    Procedure: INJECT EPIDURAL LUMBAR;   lumbar epidural steroid injection Left Lumbar 5 to sacral 1;  Surgeon: Pawan Davenport MD;  Location: [facility]     REMOVAL OF SPERM DUCT(S)      Vasectomy     REPLACE VALVE AORTIC N/A 9/14/2015    Procedure: REPLACE VALVE AORTIC;  Surgeon: Sang Chan MD;  Location: [facility]       Family History   Problem Relation Age of Onset     Cerebrovascular Disease Mother      Cerebrovascular Disease Father      Hypertension Father      C.A.D. Sister      C.A.D. Brother      Diabetes Maternal Aunt      Prostate Cancer Brother      Cancer - colorectal No family hx of        Social History     Socioeconomic History     Marital status:      Spouse name: Not on file     Number of children: Not on file     Years of education: Not on file     Highest education level: Not on file   Occupational History     Occupation: Laundry Mat   Social Needs     Financial resource strain: Not on file     Food insecurity     Worry: Not on file     Inability: Not on file     Transportation needs     Medical: Not on file     Non-medical: Not on file   Tobacco Use     Smoking status: Former Smoker     Smokeless tobacco: Never Used     Tobacco comment: quit 25 yr ago   Substance and Sexual Activity     Alcohol use: Yes     Alcohol/week: 3.0 - 4.0 standard drinks     Types: 3 - 4 Standard drinks or equivalent per week     Comment: couple times a week     Drug use: No     Sexual activity: Yes     Partners: Female   Lifestyle     Physical activity     Days per week: Not on file     Minutes per session: Not on file     Stress: Not on file   Relationships     Social connections     Talks on phone: Not on file     Gets together: Not on file     Attends Yazidism service: Not on file     Active member of club or organization: Not on file     Attends meetings of clubs or organizations: Not on file     Relationship status: Not on file     Intimate partner violence     Fear of current or ex partner: Not on file     Emotionally abused: Not on file      Physically abused: Not on file     Forced sexual activity: Not on file   Other Topics Concern      Service Yes     Blood Transfusions No     Caffeine Concern No     Comment: coffee- 3-4 cups daily     Occupational Exposure No     Hobby Hazards No     Sleep Concern Yes     Comment: trouble sleeping     Stress Concern No     Weight Concern No     Special Diet No     Back Care Yes     Comment: Low back pain with riding in car     Exercise No     Bike Helmet Not Asked     Seat Belt Yes     Self-Exams Yes     Parent/sibling w/ CABG, MI or angioplasty before 65F 55M? Not Asked   Social History Narrative     Not on file       Current Outpatient Medications   Medication Sig Dispense Refill     acetaminophen 650 MG TABS Take 650 mg by mouth every 6 hours 100 tablet      allopurinol (ZYLOPRIM) 100 MG tablet Take 1 tablet (100 mg) by mouth 2 times daily 180 tablet 3     apixaban ANTICOAGULANT (ELIQUIS) 5 MG tablet Take 1 tablet (5 mg) by mouth 2 times daily 180 tablet 3     aspirin EC 81 MG EC tablet Take 1 tablet (81 mg) by mouth daily       atorvastatin (LIPITOR) 40 MG tablet Take 1 tablet (40 mg) by mouth daily 90 tablet 3     carvedilol (COREG) 12.5 MG tablet Take 1 tablet (12.5 mg) by mouth 2 times daily (with meals) 180 tablet 3     finasteride (PROSCAR) 5 MG tablet TAKE ONE TABLET BY MOUTH ONCE DAILY - NEED OFFICE VISIT FOR FURTHER REFILLS 90 tablet 3     FLUoxetine (PROZAC) 20 MG capsule Take 3 capsules (60 mg) by mouth daily 270 capsule 3     furosemide (LASIX) 20 MG tablet Take 1 tablet (20 mg) by mouth daily 90 tablet 3     gabapentin (NEURONTIN) 100 MG capsule 1 twice a day for two weeks then 2 twice a day for two weeks then 3 twice a day 180 capsule 3     losartan (COZAAR) 50 MG tablet TAKE ONE TABLET BY MOUTH ONCE DAILY 90 tablet 0     nitroglycerin (NITROSTAT) 0.4 MG SL tablet Place 1 tablet (0.4 mg) under the tongue every 5 minutes as needed for chest pain 25 tablet 4     polyethylene glycol  "(MIRALAX/GLYCOLAX) packet Take 17 g by mouth daily (Patient taking differently: Take 17 g by mouth as needed ) 7 packet        Allergies   Allergen Reactions     Seroquel [Quetiapine] Other (See Comments)     Felt funny       REVIEW OF SYSTEMS:  CONSTITUTIONAL:  NEGATIVE for fever, chills, change in weight, not feeling tired  SKIN:  NEGATIVE for worrisome rashes, no skin lumps, no skin ulcers and no non-healing wounds  EYES:  NEGATIVE for vision changes or irritation.  ENT/MOUTH:  NEGATIVE.  No hearing loss, no hoarseness, no difficulty swallowing.  RESP:  NEGATIVE. No cough or shortness of breath.  CV:  NEGATIVE for chest pain, palpitations or peripheral edema  GI:  NEGATIVE for nausea, abdominal pain, heartburn, or change in bowel habits  :  Negative. No dysuria, no hematuria  MUSCULOSKELETAL:  See HPI above  NEURO:  NEGATIVE . No headaches, no dizziness,  no numbness  ENDOCRINE:  NEGATIVE for temperature intolerance, skin/hair changes  HEME/ALLERGY/IMMUNE:  NEGATIVE for bleeding problems  PSYCHIATRIC:  NEGATIVE. no anxiety, no depression.     Exam:  Vitals: Resp 18   Ht 1.727 m (5' 8\")   Wt 106.1 kg (234 lb)   BMI 35.58 kg/m    BMI= Body mass index is 35.58 kg/m .  Constitutional:  healthy, alert and no distress  Neuro: Alert and Oriented x 3, Sensation grossly WNL.  Psych: Affect normal   Respiratory: Breathing not labored.  Cardiovascular: normal peripheral pulses  Lymph: no adenopathy  Skin: No rashes,worrisome lesions or skin problems  He has Dupuytren's band to his left ring finger.  It is not yet narciso the MCP joint.  The knee that is he has mild triggering of the left fourth and fifth fingers.  I can entrap the fingers with pushing at the A1 pulley.  He did not trigger on his own today.  Sensation, motor and circulation are intact.    Assessment:  1. Left 4th and 5th trigger finger.  2. Left ring Dupuytren's contracture.    Plan: We discussed options of trying steroid injection for the trigger " fingers versus surgical release of the trigger fingers and excision of the Dupuytren's at the same time.  After thorough discussion of the risks and benefits he would like to try steroid injections.He would like to proceed with injection.  With the patient's consent, I injected the left  ring and little finger tendon sheath with Kenalog 20 mg and lidocaine after sterile prep.    Return to clinic as needed.

## 2021-07-08 NOTE — PROGRESS NOTES
Hand / Upper Extremity Injection/Arthrocentesis: L ring A1    Date/Time: 7/8/2021 3:46 PM  Performed by: Delon Chairez MD  Authorized by: Delon Chairez MD     Indications:  Pain  Needle Size:  25 G  Guidance: landmark    Condition: trigger finger    Location:  Ring finger    Site:  L ring A1  Medications:  20 mg triamcinolone 40 MG/ML; 0.5 mL lidocaine 1 %  Medications comment:  0.5ml 0.5% bupivicaine  NDC:[id number]  Lot: [id number]  8/31/21        Outcome:  Tolerated well, no immediate complications  Procedure discussed: discussed risks, benefits, and alternatives    Consent Given by:  Patient  Timeout: timeout called immediately prior to procedure    Prep: patient was prepped and draped in usual sterile fashion    Hand / Upper Extremity Injection/Arthrocentesis: L small A1    Date/Time: 7/8/2021 3:48 PM  Performed by: Delon Chairez MD  Authorized by: Delon Chairez MD     Indications:  Pain  Needle Size:  25 G  Guidance: landmark    Condition: trigger finger    Location:  Small finger    Site:  L small A1  Medications:  20 mg triamcinolone 40 MG/ML; 0.5 mL lidocaine 1 %  Medications comment:  0.5ml 0.5% bupivicaine  NDC:16313-991-98  Lot: [id number]  8/31/21        Outcome:  Tolerated well, no immediate complications  Procedure discussed: discussed risks, benefits, and alternatives    Consent Given by:  Patient  Timeout: timeout called immediately prior to procedure    Prep: patient was prepped and draped in usual sterile fashion

## 2021-09-01 NOTE — TELEPHONE ENCOUNTER
Pending Prescriptions:                       Disp   Refills    losartan (COZAAR) 50 MG tablet [Pharmacy M*90 tab*0        Sig: TAKE ONE TABLET BY MOUTH ONCE DAILY  Routing refill request to provider for review/approval because:  Labs out of range:    Creatinine   Date Value Ref Range Status   06/29/2021 1.36 (H) 0.66 - 1.25 mg/dL Final

## 2021-10-01 NOTE — PROGRESS NOTES
Prior to immunization administration, verified patients identity using patient s name and date of birth. Please see Immunization Activity for additional information.     Screening Questionnaire for Adult Immunization    Are you sick today?   No   Do you have allergies to medications, food, a vaccine component or latex?   Yes   Have you ever had a serious reaction after receiving a vaccination?   No   Do you have a long-term health problem with heart, lung, kidney, or metabolic disease (e.g., diabetes), asthma, a blood disorder, no spleen, complement component deficiency, a cochlear implant, or a spinal fluid leak?  Are you on long-term aspirin therapy?   Yes   Do you have cancer, leukemia, HIV/AIDS, or any other immune system problem?   No   Do you have a parent, brother, or sister with an immune system problem?   No   In the past 3 months, have you taken medications that affect  your immune system, such as prednisone, other steroids, or anticancer drugs; drugs for the treatment of rheumatoid arthritis, Crohn s disease, or psoriasis; or have you had radiation treatments?   No   Have you had a seizure, or a brain or other nervous system problem?   No   During the past year, have you received a transfusion of blood or blood    products, or been given immune (gamma) globulin or antiviral drug?   No   For women: Are you pregnant or is there a chance you could become       pregnant during the next month?   No   Have you received any vaccinations in the past 4 weeks?   No     Immunization questionnaire was positive for at least one answer.  Notified Yes.        Per orders of Dr. Richi Alvarez, injection of HD Influenza given by Jennifer Hernandez CMA. Patient instructed to remain in clinic for 15 minutes afterwards, and to report any adverse reaction to me immediately.       Screening performed by Jennifer Hernandez CMA on 10/1/2021 at 1:44 PM.

## 2021-10-01 NOTE — PROGRESS NOTES
"    Assessment & Plan     Chronic pain of left knee  Chronic knee pain on the left side, will get x-rays of both knees, recommended an injection in a couple weeks when he comes back.  We will also check his hip x-ray.  - XR Knee AP Standing Bilateral; Future  - XR Hip Left 2-3 Views; Future    Acute gouty arthrit  Gout has been stable with allopurinol, will refill for the next year, they go to [place] until June    - allopurinol (ZYLOPRIM) 100 MG tablet; Take 1 tablet (100 mg) by mouth 2 times daily    S/P AVR (aortic valve replacement)  Aortic valve is replaced, he is on Eliquis, Lasix stable doing well  - apixaban ANTICOAGULANT (ELIQUIS) 5 MG tablet; Take 1 tablet (5 mg) by mouth 2 times daily  - furosemide (LASIX) 20 MG tablet; Take 1 tablet (20 mg) by mouth daily    Hyperlipidemia LDL goal <100  Her lipidemia on Lipitor, labs were done in June and stable.  - atorvastatin (LIPITOR) 40 MG tablet; Take 1 tablet (40 mg) by mouth daily    Hypertension goal BP (blood pressure) < 140/90  Pressure is controlled a little bit high diastolic continue Coreg, Lasix, Cozaar.  - carvedilol (COREG) 12.5 MG tablet; Take 1 tablet (12.5 mg) by mouth 2 times daily (with meals)  - furosemide (LASIX) 20 MG tablet; Take 1 tablet (20 mg) by mouth daily  - losartan (COZAAR) 50 MG tablet; Take 1 tablet (50 mg) by mouth daily    Benign prostatic hyperplasia with lower urinary tract symptoms, symptom details unspecified  BPH stable on finasteride we will refill,  - finasteride (PROSCAR) 5 MG tablet; TAKE ONE TABLET BY MOUTH ONCE DAILY - NEED OFFICE VISIT FOR FURTHER REFILLS    Mild major depression (H)  Depression appears to be stable he is on 3 capsules of Prozac we will continue this indefinitely.  - FLUoxetine (PROZAC) 20 MG capsule; Take 3 capsules (60 mg) by mouth daily    Flu shot is given today.               BMI:   Estimated body mass index is 35.28 kg/m  as calculated from the following:    Height as of 7/8/21: 1.727 m (5' 8\").    " Weight as of this encounter: 105.2 kg (232 lb).           No follow-ups on file.    Richi Alvarez MD  [facility] MIKEL Teague is a 81 year old who presents for the following health issues     HPI     Chief Complaint   Patient presents with     Recheck Medication     CAD  Gout     Feels ok except joints, hip, and knees.  Uses a cane to help avoid falling.  Did fall last week, tried walker.  Tough to sleep due to pain, left knee is the worst.     Going to [place] in Nov.      Gabapentin tried to increase to 200mg and got lightheaded.     Labs were good in June.    Past Medical History:   Diagnosis Date     Anxiety state, unspecified      Atrial fibrillation (H)      Bell's palsy 12/12/1997     Bioprosthetic aortic valve replacement during current hospitalization      Coronary atherosclerosis of unspecified type of vessel, native or graft     coronary artery disease with history of MI and stent placement      Gout, unspecified      Hydrocephalus (H) 2015     Old myocardial infarction 3/1998    Hx of MI     Osteoarthrosis, unspecified whether generalized or localized, unspecified site     Diffuse migratory arthritis     Other and unspecified hyperlipidemia      Paroxysmal supraventricular tachycardia (H)     Hx of PAT     Pure hypercholesterolemia      Stented coronary artery      Current Outpatient Medications   Medication     acetaminophen 650 MG TABS     allopurinol (ZYLOPRIM) 100 MG tablet     apixaban ANTICOAGULANT (ELIQUIS) 5 MG tablet     aspirin EC 81 MG EC tablet     atorvastatin (LIPITOR) 40 MG tablet     carvedilol (COREG) 12.5 MG tablet     finasteride (PROSCAR) 5 MG tablet     FLUoxetine (PROZAC) 20 MG capsule     furosemide (LASIX) 20 MG tablet     losartan (COZAAR) 50 MG tablet     polyethylene glycol (MIRALAX/GLYCOLAX) packet     nitroglycerin (NITROSTAT) 0.4 MG SL tablet     Current Facility-Administered Medications   Medication     lidocaine 1 % injection 0.5 mL      lidocaine 1 % injection 0.5 mL     triamcinolone (KENALOG-40) injection 20 mg     triamcinolone (KENALOG-40) injection 20 mg     Social History     Tobacco Use     Smoking status: Former Smoker     Smokeless tobacco: Never Used     Tobacco comment: quit 25 yr ago   Substance Use Topics     Alcohol use: Yes     Alcohol/week: 3.0 - 4.0 standard drinks     Types: 3 - 4 Standard drinks or equivalent per week     Comment: couple times a week     Drug use: No          Review of Systems         Objective    BP (!) 134/96   Pulse 86   Temp 97.4  F (36.3  C) (Temporal)   Resp 16   Wt 105.2 kg (232 lb)   SpO2 98%   BMI 35.28 kg/m    Body mass index is 35.28 kg/m .  Physical Exam   Cute distress, slightly confused  Heart is regular  Lungs are clear  Extremities without edema  His knees have braces on both of them, sore to touch on the left knee joint line

## 2021-10-15 NOTE — PROGRESS NOTES
"Chief Complaint   Patient presents with     Musculoskeletal Problem     knee injection         Assessment & Plan     Arthritis of left knee  Will do injection today.    Procedure note-left knee injection  Consent obtained  Area was marked and prepped with chloroprep  Using a 25 gauge 1.5 inch needle injected 40 mg of kenalog and 2 ml of lidocaine, both from new vials.   No complications, aftercare instructions given to patient.                BMI:   Estimated body mass index is 34.91 kg/m  as calculated from the following:    Height as of 7/8/21: 1.727 m (5' 8\").    Weight as of this encounter: 104.1 kg (229 lb 9.6 oz).           No follow-ups on file.    Richi Alvarez MD  [facility] is [facility] 81 year [facility] who presents for the following health issues  accompanied by his spouse: Kandis    DARIUS     Here for left knee injection       Review of Systems         Objective    /72   Pulse 93   Temp 97.1  F (36.2  C) (Temporal)   Resp 18   Wt 104.1 kg (229 lb 9.6 oz)   SpO2 96%   BMI 34.91 kg/m    Body mass index is 34.91 kg/m .  Physical Exam   NAD  Left knee sore at joint line                 "

## 2022-01-01 ENCOUNTER — HEALTH MAINTENANCE LETTER (OUTPATIENT)
Age: 82
End: 2022-01-01

## 2022-01-01 ENCOUNTER — LAB REQUISITION (OUTPATIENT)
Dept: LAB | Facility: CLINIC | Age: 82
End: 2022-01-01
Payer: MEDICARE

## 2022-01-01 ENCOUNTER — LAB REQUISITION (OUTPATIENT)
Dept: LAB | Facility: CLINIC | Age: 82
End: 2022-01-01

## 2022-01-01 DIAGNOSIS — Z11.1 ENCOUNTER FOR SCREENING FOR RESPIRATORY TUBERCULOSIS: ICD-10-CM

## 2022-01-01 DIAGNOSIS — R33.9 RETENTION OF URINE, UNSPECIFIED: ICD-10-CM

## 2022-01-01 LAB
ALBUMIN UR-MCNC: 100 MG/DL
APPEARANCE UR: CLEAR
BACTERIA #/AREA URNS HPF: ABNORMAL /HPF
BACTERIA UR CULT: ABNORMAL
BACTERIA UR CULT: ABNORMAL
BACTERIA UR CULT: NORMAL
BILIRUB UR QL STRIP: NEGATIVE
COLOR UR AUTO: YELLOW
GAMMA INTERFERON BACKGROUND BLD IA-ACNC: 0.09 IU/ML
GLUCOSE UR STRIP-MCNC: NEGATIVE MG/DL
HGB UR QL STRIP: ABNORMAL
KETONES UR STRIP-MCNC: NEGATIVE MG/DL
LEUKOCYTE ESTERASE UR QL STRIP: ABNORMAL
M TB IFN-G BLD-IMP: NEGATIVE
M TB IFN-G CD4+ BCKGRND COR BLD-ACNC: 9.91 IU/ML
MITOGEN IGNF BCKGRD COR BLD-ACNC: 0.02 IU/ML
MITOGEN IGNF BCKGRD COR BLD-ACNC: 0.03 IU/ML
NITRATE UR QL: POSITIVE
PH UR STRIP: 7.5 [PH] (ref 5–7)
QUANTIFERON MITOGEN: 10 IU/ML
QUANTIFERON NIL TUBE: 0.09 IU/ML
QUANTIFERON TB1 TUBE: 0.12 IU/ML
QUANTIFERON TB2 TUBE: 0.11
RBC URINE: 22 /HPF
SP GR UR STRIP: 1.02 (ref 1–1.03)
UROBILINOGEN UR STRIP-MCNC: NORMAL MG/DL
WBC CLUMPS #/AREA URNS HPF: PRESENT /HPF
WBC URINE: >182 /HPF

## 2022-01-01 PROCEDURE — 86481 TB AG RESPONSE T-CELL SUSP: CPT | Performed by: FAMILY MEDICINE

## 2022-01-01 PROCEDURE — 87086 URINE CULTURE/COLONY COUNT: CPT | Performed by: FAMILY MEDICINE

## 2022-01-01 PROCEDURE — P9603 ONE-WAY ALLOW PRORATED MILES: HCPCS | Performed by: FAMILY MEDICINE

## 2022-01-01 PROCEDURE — 36415 COLL VENOUS BLD VENIPUNCTURE: CPT | Performed by: FAMILY MEDICINE

## 2022-01-01 PROCEDURE — 81001 URINALYSIS AUTO W/SCOPE: CPT | Performed by: FAMILY MEDICINE

## 2022-01-01 PROCEDURE — 87086 URINE CULTURE/COLONY COUNT: CPT | Performed by: NURSE PRACTITIONER

## 2024-10-23 ASSESSMENT — PATIENT HEALTH QUESTIONNAIRE - PHQ9: SUM OF ALL RESPONSES TO PHQ QUESTIONS 1-9: 13

## (undated) DEVICE — SYR 10ML FINGER CONTROL W/O NDL 309695

## (undated) DEVICE — SYR 05ML LL W/O NDL

## (undated) DEVICE — GLOVE PROTEXIS W/NEU-THERA 7.5  2D73TE75

## (undated) DEVICE — TUBING IV EXTENSION SET 34"

## (undated) DEVICE — NDL ECLIPSE 18GA 1.5"

## (undated) DEVICE — TRAY PROCEDURE SUPPORT PAIN MANAGEMENT 332114

## (undated) DEVICE — NDL SPINAL 22GA 5" QUINCKE 405148

## (undated) RX ORDER — LIDOCAINE HYDROCHLORIDE 20 MG/ML
INJECTION, SOLUTION EPIDURAL; INFILTRATION; INTRACAUDAL; PERINEURAL
Status: DISPENSED
Start: 2017-06-08

## (undated) RX ORDER — LIDOCAINE HYDROCHLORIDE 10 MG/ML
INJECTION, SOLUTION EPIDURAL; INFILTRATION; INTRACAUDAL; PERINEURAL
Status: DISPENSED
Start: 2017-06-08